# Patient Record
Sex: FEMALE | Race: WHITE | NOT HISPANIC OR LATINO | Employment: UNEMPLOYED | URBAN - NONMETROPOLITAN AREA
[De-identification: names, ages, dates, MRNs, and addresses within clinical notes are randomized per-mention and may not be internally consistent; named-entity substitution may affect disease eponyms.]

---

## 2017-04-21 ENCOUNTER — LAB (OUTPATIENT)
Dept: ONCOLOGY | Facility: HOSPITAL | Age: 67
End: 2017-04-21

## 2017-04-21 ENCOUNTER — CONSULT (OUTPATIENT)
Dept: ONCOLOGY | Facility: CLINIC | Age: 67
End: 2017-04-21

## 2017-04-21 VITALS
SYSTOLIC BLOOD PRESSURE: 98 MMHG | TEMPERATURE: 98.9 F | HEIGHT: 67 IN | RESPIRATION RATE: 16 BRPM | WEIGHT: 154.5 LBS | BODY MASS INDEX: 24.25 KG/M2 | DIASTOLIC BLOOD PRESSURE: 54 MMHG | HEART RATE: 101 BPM

## 2017-04-21 DIAGNOSIS — D64.9 ANEMIA, UNSPECIFIED TYPE: Primary | ICD-10-CM

## 2017-04-21 DIAGNOSIS — D64.9 ANEMIA, UNSPECIFIED TYPE: ICD-10-CM

## 2017-04-21 PROBLEM — I10 HTN (HYPERTENSION), BENIGN: Status: ACTIVE | Noted: 2017-04-21

## 2017-04-21 PROBLEM — D50.8 IRON DEFICIENCY ANEMIA SECONDARY TO INADEQUATE DIETARY IRON INTAKE: Status: ACTIVE | Noted: 2017-04-21

## 2017-04-21 PROBLEM — M54.89 BACK PAIN DUE TO INFLAMMATORY PROCESS: Status: ACTIVE | Noted: 2017-04-21

## 2017-04-21 LAB
ALBUMIN SERPL-MCNC: 3.1 G/DL (ref 3.4–4.8)
ALBUMIN/GLOB SERPL: 1.2 G/DL (ref 1.1–1.8)
ALP SERPL-CCNC: 53 U/L (ref 38–126)
ALT SERPL W P-5'-P-CCNC: 21 U/L (ref 9–52)
ANION GAP SERPL CALCULATED.3IONS-SCNC: 9 MMOL/L (ref 5–15)
ANISOCYTOSIS BLD QL: NORMAL
AST SERPL-CCNC: 22 U/L (ref 14–36)
BASOPHILS # BLD AUTO: 0.05 10*3/MM3 (ref 0–0.2)
BASOPHILS NFR BLD AUTO: 0.8 % (ref 0–2)
BILIRUB SERPL-MCNC: 0.2 MG/DL (ref 0.2–1.3)
BUN BLD-MCNC: 18 MG/DL (ref 7–21)
BUN/CREAT SERPL: 20.7 (ref 7–25)
CALCIUM SPEC-SCNC: 8.2 MG/DL (ref 8.4–10.2)
CHLORIDE SERPL-SCNC: 102 MMOL/L (ref 95–110)
CO2 SERPL-SCNC: 26 MMOL/L (ref 22–31)
CREAT BLD-MCNC: 0.87 MG/DL (ref 0.5–1)
DEPRECATED RDW RBC AUTO: 50.7 FL (ref 36.4–46.3)
EOSINOPHIL # BLD AUTO: 0.32 10*3/MM3 (ref 0–0.7)
EOSINOPHIL NFR BLD AUTO: 5.3 % (ref 0–7)
ERYTHROCYTE [DISTWIDTH] IN BLOOD BY AUTOMATED COUNT: 18 % (ref 11.5–14.5)
FERRITIN SERPL-MCNC: 11.1 NG/ML (ref 11.1–264)
GFR SERPL CREATININE-BSD FRML MDRD: 65 ML/MIN/1.73 (ref 60–104)
GLOBULIN UR ELPH-MCNC: 2.5 GM/DL (ref 2.3–3.5)
GLUCOSE BLD-MCNC: 118 MG/DL (ref 60–100)
HCT VFR BLD AUTO: 24.1 % (ref 35–45)
HGB BLD-MCNC: 7 G/DL (ref 12–15.5)
HYPOCHROMIA BLD QL: NORMAL
IMM GRANULOCYTES # BLD: 0.02 10*3/MM3 (ref 0–0.02)
IMM GRANULOCYTES NFR BLD: 0.3 % (ref 0–0.5)
IRON 24H UR-MRATE: 10 MCG/DL (ref 37–170)
IRON SATN MFR SERPL: 3 % (ref 15–50)
LDH SERPL-CCNC: 425 U/L (ref 313–618)
LYMPHOCYTES # BLD AUTO: 0.95 10*3/MM3 (ref 0.6–4.2)
LYMPHOCYTES NFR BLD AUTO: 15.7 % (ref 10–50)
MCH RBC QN AUTO: 23.1 PG (ref 26.5–34)
MCHC RBC AUTO-ENTMCNC: 29 G/DL (ref 31.4–36)
MCV RBC AUTO: 79.5 FL (ref 80–98)
MICROCYTES BLD QL: NORMAL
MONOCYTES # BLD AUTO: 0.31 10*3/MM3 (ref 0–0.9)
MONOCYTES NFR BLD AUTO: 5.1 % (ref 0–12)
NEUTROPHILS # BLD AUTO: 4.42 10*3/MM3 (ref 2–8.6)
NEUTROPHILS NFR BLD AUTO: 72.8 % (ref 37–80)
NRBC BLD MANUAL-RTO: 0 /100 WBC (ref 0–0)
PLATELET # BLD AUTO: 286 10*3/MM3 (ref 150–450)
PMV BLD AUTO: 9.1 FL (ref 8–12)
POTASSIUM BLD-SCNC: 3.3 MMOL/L (ref 3.5–5.1)
PROT SERPL-MCNC: 5.6 G/DL (ref 6.3–8.6)
RBC # BLD AUTO: 3.03 10*6/MM3 (ref 3.77–5.16)
SMALL PLATELETS BLD QL SMEAR: ADEQUATE
SODIUM BLD-SCNC: 137 MMOL/L (ref 137–145)
TIBC SERPL-MCNC: 379 MCG/DL (ref 265–497)
VIT B12 BLD-MCNC: 281 PG/ML (ref 239–931)
WBC MORPH BLD: NORMAL
WBC NRBC COR # BLD: 6.07 10*3/MM3 (ref 3.2–9.8)

## 2017-04-21 PROCEDURE — 83550 IRON BINDING TEST: CPT | Performed by: INTERNAL MEDICINE

## 2017-04-21 PROCEDURE — 82728 ASSAY OF FERRITIN: CPT | Performed by: INTERNAL MEDICINE

## 2017-04-21 PROCEDURE — 83540 ASSAY OF IRON: CPT | Performed by: INTERNAL MEDICINE

## 2017-04-21 PROCEDURE — 83615 LACTATE (LD) (LDH) ENZYME: CPT | Performed by: INTERNAL MEDICINE

## 2017-04-21 PROCEDURE — 36415 COLL VENOUS BLD VENIPUNCTURE: CPT | Performed by: INTERNAL MEDICINE

## 2017-04-21 PROCEDURE — 85025 COMPLETE CBC W/AUTO DIFF WBC: CPT | Performed by: INTERNAL MEDICINE

## 2017-04-21 PROCEDURE — 80053 COMPREHEN METABOLIC PANEL: CPT | Performed by: INTERNAL MEDICINE

## 2017-04-21 PROCEDURE — 85007 BL SMEAR W/DIFF WBC COUNT: CPT | Performed by: INTERNAL MEDICINE

## 2017-04-21 PROCEDURE — G0463 HOSPITAL OUTPT CLINIC VISIT: HCPCS | Performed by: INTERNAL MEDICINE

## 2017-04-21 PROCEDURE — 82607 VITAMIN B-12: CPT | Performed by: INTERNAL MEDICINE

## 2017-04-21 PROCEDURE — 99214 OFFICE O/P EST MOD 30 MIN: CPT | Performed by: INTERNAL MEDICINE

## 2017-04-21 RX ORDER — DULOXETIN HYDROCHLORIDE 60 MG/1
CAPSULE, DELAYED RELEASE ORAL
COMMUNITY
Start: 2016-12-27 | End: 2018-01-29 | Stop reason: SDUPTHER

## 2017-04-21 RX ORDER — TRAZODONE HYDROCHLORIDE 150 MG/1
TABLET ORAL
COMMUNITY
Start: 2016-12-27 | End: 2018-01-29 | Stop reason: SDUPTHER

## 2017-04-21 RX ORDER — TRAMADOL HYDROCHLORIDE 50 MG/1
50 TABLET ORAL EVERY 8 HOURS
COMMUNITY
Start: 2015-06-04

## 2017-04-21 RX ORDER — ATENOLOL 25 MG/1
TABLET ORAL
COMMUNITY
Start: 2016-12-27 | End: 2018-01-29 | Stop reason: SDUPTHER

## 2017-04-21 RX ORDER — OXYCODONE AND ACETAMINOPHEN 7.5; 325 MG/1; MG/1
1 TABLET ORAL DAILY PRN
COMMUNITY

## 2017-04-21 RX ORDER — CYCLOBENZAPRINE HCL 10 MG
10 TABLET ORAL 2 TIMES DAILY
COMMUNITY

## 2017-04-21 NOTE — PROGRESS NOTES
"  Subjective    Mr. Jett Duong is seen at the request of Dr. Mack Cameron for anemia.  Patient is feeling tired.         History of Present Illness    This is a 67-year-old female who was seen in our office more than 2 years ago.  Patient has a history of iron deficiency anemia with negative GI workup.  She has had upper and lower endoscopy which were negative.  She could not complete the capsule endoscopy study.  There is no history of GI bleed or any other signs of blood loss.  Patient was found to have a hemoglobin of 8.2 and hence sent over here.  No other complaints.    Past Medical History, Past Surgical History, Social History, Family History have been reviewed and are without significant changes except as mentioned.    Review of Systems   Constitutional: Negative.    HENT: Negative.    Eyes: Negative.    Respiratory: Negative.    Cardiovascular: Negative.    Gastrointestinal: Negative.    Endocrine: Negative.    Genitourinary: Negative.    Musculoskeletal: Negative.    Skin: Negative.    Allergic/Immunologic: Negative.    Neurological: Negative.    Hematological: Negative.    Psychiatric/Behavioral: Negative.       A comprehensive 14 point review of systems was performed and was negative except as mentioned.    Medications:  The current medication list was reviewed in the EMR    ALLERGIES:    Allergies   Allergen Reactions   • Diphenhydramine    • Metoclopramide    • Promethazine        Objective      Vitals:    04/21/17 1327   BP: 98/54   Pulse: 101   Resp: 16   Temp: 98.9 °F (37.2 °C)   Weight: 154 lb 8 oz (70.1 kg)   Height: 66.5\" (168.9 cm)   PainSc:   5   PainLoc: Back     No flowsheet data found.    Physical Exam   Constitutional: She is oriented to person, place, and time. She appears well-developed and well-nourished.   HENT:   Head: Normocephalic and atraumatic.   Mouth/Throat: Oropharynx is clear and moist.   Eyes: EOM are normal. Pupils are equal, round, and reactive to light.   Neck: Normal range " of motion. Neck supple.   Cardiovascular: Normal rate, regular rhythm, normal heart sounds and intact distal pulses.    Pulmonary/Chest: Effort normal and breath sounds normal.   Abdominal: Soft. Bowel sounds are normal.   Musculoskeletal: Normal range of motion.   Neurological: She is alert and oriented to person, place, and time. She has normal reflexes.   Skin: Skin is warm.   Psychiatric: She has a normal mood and affect. Her behavior is normal. Judgment and thought content normal.         RECENT LABS:  Hematology WBC   Date Value Ref Range Status   06/13/2016 7.0 3.2 - 9.8 x1000/uL Final     RBC   Date Value Ref Range Status   06/13/2016 3.37 (L) 3.77 - 5.16 niru/mm3 Final     Hemoglobin   Date Value Ref Range Status   06/13/2016 8.6 (L) 12.0 - 15.5 gm/dl Final     Hematocrit   Date Value Ref Range Status   06/13/2016 28.9 (L) 35.0 - 45.0 % Final     Platelets   Date Value Ref Range Status   06/13/2016 262 150 - 450 x1000/mm3 Final              Assessment/Plan   1.  Iron deficiency anemia with negative GI workup  2.  Hypertension  3.  Chronic back pain     Patient with history of iron deficiency anemia.  Patient has been unable to tolerate the oral iron.  I will proceed with the blood work including vitamin B12 LDH and profile and ferritin.  Based upon these results we will plan on IV iron.  She may require repeat GI workup.  Patient will be seen in the office in 2 weeks time with the results.  We may consider Feraheme at that time.  Thanks for allowing me to participate in taking care of this patient.                4/21/2017      CC:

## 2017-04-29 ENCOUNTER — APPOINTMENT (OUTPATIENT)
Dept: GENERAL RADIOLOGY | Facility: HOSPITAL | Age: 67
End: 2017-04-29

## 2017-04-29 ENCOUNTER — HOSPITAL ENCOUNTER (EMERGENCY)
Facility: HOSPITAL | Age: 67
Discharge: HOME | End: 2017-04-29
Attending: FAMILY MEDICINE | Admitting: FAMILY MEDICINE

## 2017-04-29 VITALS
HEIGHT: 67 IN | TEMPERATURE: 98.4 F | DIASTOLIC BLOOD PRESSURE: 69 MMHG | OXYGEN SATURATION: 99 % | BODY MASS INDEX: 23.54 KG/M2 | HEART RATE: 89 BPM | WEIGHT: 150 LBS | RESPIRATION RATE: 18 BRPM | SYSTOLIC BLOOD PRESSURE: 152 MMHG

## 2017-04-29 DIAGNOSIS — S22.000A COMPRESSION FRACTURE OF THORACIC VERTEBRA, CLOSED, INITIAL ENCOUNTER (HCC): ICD-10-CM

## 2017-04-29 DIAGNOSIS — E87.6 HYPOKALEMIA: ICD-10-CM

## 2017-04-29 DIAGNOSIS — D50.9 IRON DEFICIENCY ANEMIA, UNSPECIFIED IRON DEFICIENCY ANEMIA TYPE: Primary | ICD-10-CM

## 2017-04-29 LAB
ACANTHOCYTES BLD QL SMEAR: NORMAL
ALBUMIN SERPL-MCNC: 3.9 G/DL (ref 3.4–4.8)
ALBUMIN/GLOB SERPL: 1.4 G/DL (ref 1.1–1.8)
ALP SERPL-CCNC: 78 U/L (ref 38–126)
ALT SERPL W P-5'-P-CCNC: 32 U/L (ref 9–52)
ANION GAP SERPL CALCULATED.3IONS-SCNC: 11 MMOL/L (ref 5–15)
ANISOCYTOSIS BLD QL: NORMAL
AST SERPL-CCNC: 31 U/L (ref 14–36)
BILIRUB SERPL-MCNC: 0.3 MG/DL (ref 0.2–1.3)
BUN BLD-MCNC: 10 MG/DL (ref 7–21)
BUN/CREAT SERPL: 12.8 (ref 7–25)
CALCIUM SPEC-SCNC: 8.9 MG/DL (ref 8.4–10.2)
CHLORIDE SERPL-SCNC: 102 MMOL/L (ref 95–110)
CO2 SERPL-SCNC: 26 MMOL/L (ref 22–31)
CREAT BLD-MCNC: 0.78 MG/DL (ref 0.5–1)
DEPRECATED RDW RBC AUTO: 54 FL (ref 36.4–46.3)
EOSINOPHIL # BLD MANUAL: 0.22 10*3/MM3 (ref 0–0.7)
EOSINOPHIL NFR BLD MANUAL: 3 % (ref 0–7)
ERYTHROCYTE [DISTWIDTH] IN BLOOD BY AUTOMATED COUNT: 18.8 % (ref 11.5–14.5)
GFR SERPL CREATININE-BSD FRML MDRD: 74 ML/MIN/1.73 (ref 45–104)
GLOBULIN UR ELPH-MCNC: 2.8 GM/DL (ref 2.3–3.5)
GLUCOSE BLD-MCNC: 92 MG/DL (ref 60–100)
HCT VFR BLD AUTO: 25.8 % (ref 35–45)
HGB BLD-MCNC: 7.7 G/DL (ref 12–15.5)
HOLD SPECIMEN: NORMAL
HYPOCHROMIA BLD QL: NORMAL
LIPASE SERPL-CCNC: 49 U/L (ref 23–300)
LYMPHOCYTES # BLD MANUAL: 1.32 10*3/MM3 (ref 0.6–4.2)
LYMPHOCYTES NFR BLD MANUAL: 18 % (ref 10–50)
LYMPHOCYTES NFR BLD MANUAL: 3 % (ref 0–12)
MCH RBC QN AUTO: 23.5 PG (ref 26.5–34)
MCHC RBC AUTO-ENTMCNC: 29.8 G/DL (ref 31.4–36)
MCV RBC AUTO: 78.7 FL (ref 80–98)
MICROCYTES BLD QL: NORMAL
MONOCYTES # BLD AUTO: 0.22 10*3/MM3 (ref 0–0.9)
NEUTROPHILS # BLD AUTO: 5.58 10*3/MM3 (ref 2–8.6)
NEUTROPHILS NFR BLD MANUAL: 76 % (ref 37–80)
NEUTS HYPERSEG # BLD: NORMAL 10*3/UL
OVALOCYTES BLD QL SMEAR: NORMAL
PLATELET # BLD AUTO: 492 10*3/MM3 (ref 150–450)
PMV BLD AUTO: 8.4 FL (ref 8–12)
POLYCHROMASIA BLD QL SMEAR: NORMAL
POTASSIUM BLD-SCNC: 3.2 MMOL/L (ref 3.5–5.1)
PROT SERPL-MCNC: 6.7 G/DL (ref 6.3–8.6)
RBC # BLD AUTO: 3.28 10*6/MM3 (ref 3.77–5.16)
SCHISTOCYTES BLD QL SMEAR: NORMAL
SMALL PLATELETS BLD QL SMEAR: NORMAL
SODIUM BLD-SCNC: 139 MMOL/L (ref 137–145)
WBC NRBC COR # BLD: 7.34 10*3/MM3 (ref 3.2–9.8)
WHOLE BLOOD HOLD SPECIMEN: NORMAL

## 2017-04-29 PROCEDURE — 85007 BL SMEAR W/DIFF WBC COUNT: CPT | Performed by: FAMILY MEDICINE

## 2017-04-29 PROCEDURE — 85025 COMPLETE CBC W/AUTO DIFF WBC: CPT | Performed by: FAMILY MEDICINE

## 2017-04-29 PROCEDURE — 80053 COMPREHEN METABOLIC PANEL: CPT | Performed by: FAMILY MEDICINE

## 2017-04-29 PROCEDURE — 25010000002 MORPHINE PER 10 MG: Performed by: FAMILY MEDICINE

## 2017-04-29 PROCEDURE — 83690 ASSAY OF LIPASE: CPT | Performed by: FAMILY MEDICINE

## 2017-04-29 PROCEDURE — 72100 X-RAY EXAM L-S SPINE 2/3 VWS: CPT

## 2017-04-29 PROCEDURE — 96376 TX/PRO/DX INJ SAME DRUG ADON: CPT

## 2017-04-29 PROCEDURE — 72072 X-RAY EXAM THORAC SPINE 3VWS: CPT

## 2017-04-29 PROCEDURE — 99283 EMERGENCY DEPT VISIT LOW MDM: CPT

## 2017-04-29 PROCEDURE — 96374 THER/PROPH/DIAG INJ IV PUSH: CPT

## 2017-04-29 RX ORDER — DOXYCYCLINE HYCLATE 50 MG/1
324 CAPSULE, GELATIN COATED ORAL
Qty: 20 TABLET | Refills: 0 | Status: SHIPPED | OUTPATIENT
Start: 2017-04-29 | End: 2017-08-10

## 2017-04-29 RX ORDER — MORPHINE SULFATE 4 MG/ML
4 INJECTION, SOLUTION INTRAMUSCULAR; INTRAVENOUS ONCE
Status: COMPLETED | OUTPATIENT
Start: 2017-04-29 | End: 2017-04-29

## 2017-04-29 RX ORDER — POTASSIUM CHLORIDE 750 MG/1
10 TABLET, FILM COATED, EXTENDED RELEASE ORAL 2 TIMES DAILY
Qty: 20 TABLET | Refills: 0 | Status: SHIPPED | OUTPATIENT
Start: 2017-04-29 | End: 2018-01-05

## 2017-04-29 RX ADMIN — MORPHINE SULFATE 4 MG: 4 INJECTION, SOLUTION INTRAMUSCULAR; INTRAVENOUS at 18:06

## 2017-04-29 RX ADMIN — MORPHINE SULFATE 4 MG: 4 INJECTION, SOLUTION INTRAMUSCULAR; INTRAVENOUS at 15:33

## 2017-05-03 ENCOUNTER — INFUSION (OUTPATIENT)
Dept: ONCOLOGY | Facility: HOSPITAL | Age: 67
End: 2017-05-03

## 2017-05-03 VITALS
HEART RATE: 94 BPM | RESPIRATION RATE: 16 BRPM | SYSTOLIC BLOOD PRESSURE: 127 MMHG | DIASTOLIC BLOOD PRESSURE: 67 MMHG | TEMPERATURE: 98.2 F

## 2017-05-03 DIAGNOSIS — I10 HTN (HYPERTENSION), BENIGN: ICD-10-CM

## 2017-05-03 DIAGNOSIS — D50.8 IRON DEFICIENCY ANEMIA SECONDARY TO INADEQUATE DIETARY IRON INTAKE: Primary | ICD-10-CM

## 2017-05-03 DIAGNOSIS — M54.89 BACK PAIN DUE TO INFLAMMATORY PROCESS: ICD-10-CM

## 2017-05-03 PROCEDURE — 25010000002 FERUMOXYTOL 510 MG/17ML SOLUTION 510 MG VIAL: Performed by: INTERNAL MEDICINE

## 2017-05-03 PROCEDURE — 96374 THER/PROPH/DIAG INJ IV PUSH: CPT | Performed by: INTERNAL MEDICINE

## 2017-05-03 RX ORDER — SODIUM CHLORIDE 9 MG/ML
250 INJECTION, SOLUTION INTRAVENOUS ONCE
Status: COMPLETED | OUTPATIENT
Start: 2017-05-03 | End: 2017-05-03

## 2017-05-03 RX ADMIN — SODIUM CHLORIDE 250 ML: 9 INJECTION, SOLUTION INTRAVENOUS at 14:17

## 2017-05-03 RX ADMIN — FERUMOXYTOL 510 MG: 510 INJECTION INTRAVENOUS at 14:49

## 2017-05-04 DIAGNOSIS — D50.8 IRON DEFICIENCY ANEMIA SECONDARY TO INADEQUATE DIETARY IRON INTAKE: Primary | ICD-10-CM

## 2017-05-05 ENCOUNTER — APPOINTMENT (OUTPATIENT)
Dept: ONCOLOGY | Facility: CLINIC | Age: 67
End: 2017-05-05

## 2017-05-10 ENCOUNTER — INFUSION (OUTPATIENT)
Dept: ONCOLOGY | Facility: HOSPITAL | Age: 67
End: 2017-05-10

## 2017-05-10 VITALS
DIASTOLIC BLOOD PRESSURE: 81 MMHG | RESPIRATION RATE: 16 BRPM | TEMPERATURE: 98.2 F | HEART RATE: 80 BPM | SYSTOLIC BLOOD PRESSURE: 114 MMHG

## 2017-05-10 DIAGNOSIS — D50.8 IRON DEFICIENCY ANEMIA SECONDARY TO INADEQUATE DIETARY IRON INTAKE: Primary | ICD-10-CM

## 2017-05-10 PROCEDURE — 25010000002 FERUMOXYTOL 510 MG/17ML SOLUTION 510 MG VIAL: Performed by: INTERNAL MEDICINE

## 2017-05-10 PROCEDURE — 96374 THER/PROPH/DIAG INJ IV PUSH: CPT | Performed by: INTERNAL MEDICINE

## 2017-05-10 RX ORDER — SODIUM CHLORIDE 9 MG/ML
250 INJECTION, SOLUTION INTRAVENOUS ONCE
Status: COMPLETED | OUTPATIENT
Start: 2017-05-10 | End: 2017-05-10

## 2017-05-10 RX ADMIN — SODIUM CHLORIDE 250 ML: 9 INJECTION, SOLUTION INTRAVENOUS at 14:14

## 2017-05-10 RX ADMIN — FERUMOXYTOL 510 MG: 510 INJECTION INTRAVENOUS at 14:14

## 2017-08-08 ENCOUNTER — LAB (OUTPATIENT)
Dept: ONCOLOGY | Facility: HOSPITAL | Age: 67
End: 2017-08-08

## 2017-08-08 DIAGNOSIS — D50.8 IRON DEFICIENCY ANEMIA SECONDARY TO INADEQUATE DIETARY IRON INTAKE: ICD-10-CM

## 2017-08-08 LAB
BASOPHILS # BLD AUTO: 0.03 10*3/MM3 (ref 0–0.2)
BASOPHILS NFR BLD AUTO: 0.6 % (ref 0–2)
DEPRECATED RDW RBC AUTO: 48.2 FL (ref 36.4–46.3)
EOSINOPHIL # BLD AUTO: 0.19 10*3/MM3 (ref 0–0.7)
EOSINOPHIL NFR BLD AUTO: 3.8 % (ref 0–7)
ERYTHROCYTE [DISTWIDTH] IN BLOOD BY AUTOMATED COUNT: 15.1 % (ref 11.5–14.5)
FERRITIN SERPL-MCNC: 45.6 NG/ML (ref 11.1–264)
HCT VFR BLD AUTO: 34.4 % (ref 35–45)
HGB BLD-MCNC: 11.4 G/DL (ref 12–15.5)
IMM GRANULOCYTES # BLD: 0.01 10*3/MM3 (ref 0–0.02)
IMM GRANULOCYTES NFR BLD: 0.2 % (ref 0–0.5)
IRON 24H UR-MRATE: 42 MCG/DL (ref 37–170)
IRON SATN MFR SERPL: 15 % (ref 15–50)
LYMPHOCYTES # BLD AUTO: 1.11 10*3/MM3 (ref 0.6–4.2)
LYMPHOCYTES NFR BLD AUTO: 22 % (ref 10–50)
MCH RBC QN AUTO: 29.4 PG (ref 26.5–34)
MCHC RBC AUTO-ENTMCNC: 33.1 G/DL (ref 31.4–36)
MCV RBC AUTO: 88.7 FL (ref 80–98)
MONOCYTES # BLD AUTO: 0.31 10*3/MM3 (ref 0–0.9)
MONOCYTES NFR BLD AUTO: 6.1 % (ref 0–12)
NEUTROPHILS # BLD AUTO: 3.4 10*3/MM3 (ref 2–8.6)
NEUTROPHILS NFR BLD AUTO: 67.3 % (ref 37–80)
PLATELET # BLD AUTO: 220 10*3/MM3 (ref 150–450)
PMV BLD AUTO: 9.8 FL (ref 8–12)
RBC # BLD AUTO: 3.88 10*6/MM3 (ref 3.77–5.16)
TIBC SERPL-MCNC: 288 MCG/DL (ref 265–497)
WBC NRBC COR # BLD: 5.05 10*3/MM3 (ref 3.2–9.8)

## 2017-08-08 PROCEDURE — 83550 IRON BINDING TEST: CPT | Performed by: INTERNAL MEDICINE

## 2017-08-08 PROCEDURE — 85025 COMPLETE CBC W/AUTO DIFF WBC: CPT | Performed by: INTERNAL MEDICINE

## 2017-08-08 PROCEDURE — 83540 ASSAY OF IRON: CPT | Performed by: INTERNAL MEDICINE

## 2017-08-08 PROCEDURE — 82728 ASSAY OF FERRITIN: CPT | Performed by: INTERNAL MEDICINE

## 2017-08-08 PROCEDURE — 36415 COLL VENOUS BLD VENIPUNCTURE: CPT | Performed by: INTERNAL MEDICINE

## 2017-08-10 ENCOUNTER — OFFICE VISIT (OUTPATIENT)
Dept: ONCOLOGY | Facility: CLINIC | Age: 67
End: 2017-08-10

## 2017-08-10 VITALS
SYSTOLIC BLOOD PRESSURE: 146 MMHG | RESPIRATION RATE: 18 BRPM | HEIGHT: 63 IN | DIASTOLIC BLOOD PRESSURE: 75 MMHG | HEART RATE: 74 BPM | BODY MASS INDEX: 26.81 KG/M2 | WEIGHT: 151.3 LBS | TEMPERATURE: 98.4 F

## 2017-08-10 DIAGNOSIS — D50.8 IRON DEFICIENCY ANEMIA SECONDARY TO INADEQUATE DIETARY IRON INTAKE: Primary | ICD-10-CM

## 2017-08-10 PROCEDURE — G0463 HOSPITAL OUTPT CLINIC VISIT: HCPCS | Performed by: INTERNAL MEDICINE

## 2017-08-10 PROCEDURE — 99214 OFFICE O/P EST MOD 30 MIN: CPT | Performed by: INTERNAL MEDICINE

## 2017-08-10 RX ORDER — SODIUM CHLORIDE 9 MG/ML
250 INJECTION, SOLUTION INTRAVENOUS ONCE
Status: CANCELLED | OUTPATIENT
Start: 2017-08-17

## 2017-08-10 NOTE — PROGRESS NOTES
DATE OF VISIT: 8/10/2017    REASON FOR VISIT:  Iron deficiency anemia    HISTORY OF PRESENT ILLNESS:    67-year-old female with a past medical history significant for hypertension, history of chronic back pain secondary to vertebral fracture for many years, was initially seen in consultation here at [facility] by Dr. Martinez in April 2017 for evaluation of iron deficiency anemia.  In view of patient not being able to tolerate iron by mouth she was started on intravenous Feraheme that she received 2 dose in May 2017.  She is here for follow-up visit today.  Still complains of fatigue.  Denies any blood in the stool or urine.  Denies any abnormal swollen and anywhere in the body.      PAST MEDICAL HISTORY:    Past Medical History:   Diagnosis Date   • Anemia    • Hypertension        SOCIAL HISTORY:    Social History   Substance Use Topics   • Smoking status: Never Smoker   • Smokeless tobacco: None   • Alcohol use No       Surgical History :  Past Surgical History:   Procedure Laterality Date   • CHOLECYSTECTOMY     • HYSTERECTOMY         ALLERGIES:    Allergies   Allergen Reactions   • Diphenhydramine    • Metoclopramide    • Promethazine        REVIEW OF SYSTEMS:      CONSTITUTIONAL: Positive for fatigue.  No fever, chills, or night sweats.     HEENT:  No epistaxis, mouth sores, or difficulty swallowing.    RESPIRATORY:  No new shortness of breath or cough at present.    CARDIOVASCULAR:  No chest pain or palpitations.    GASTROINTESTINAL:  No abdominal pain, nausea, vomiting, or blood in the stool.    GENITOURINARY:  No dysuria or hematuria.    MUSCULOSKELETAL:  Complains of chronic back pain secondary to vertebral fracture.     NEUROLOGICAL:  No tingling or numbness. No new headache or dizziness.     LYMPHATICS:  Denies any abnormal swollen and anywhere in the body.    SKIN:  Denies any new skin rash.          PHYSICAL EXAMINATION:      VITAL SIGNS:  /75  Pulse 74  Temp 98.4 °F (36.9 °C)  "(Temporal Artery )   Resp 18  Ht 63\" (160 cm)  Wt 151 lb 4.8 oz (68.6 kg)  BMI 26.8 kg/m2    GENERAL:  Not in any distress.    HEENT:  Normocephalic, Atraumatic.Mild Conjunctival pallor. No icterus. Extraocular Movements Intact. No Facial Asymmetry noted.    NECK:  No adenopathy. No JVD.    RESPIRATORY:  Fair air entry bilateral. No rhonchi or wheezing.    CARDIOVASCULAR:  S1, S2. Regular rate and rhythm. No murmur or gallop appreciated.    ABDOMEN:  Soft, obese, nontender. Bowel sounds present in all four quadrants.  No organomegaly appreciated.    EXTREMITIES:  No edema.No Calf Tenderness.    NEUROLOGIC:  Alert, awake and oriented ×3.  No  Motor or sensory deficit appreciated. Cranial Nerves 2-12 grossly intact.        DIAGNOSTIC DATA:    Glucose   Date Value Ref Range Status   04/29/2017 92 60 - 100 mg/dL Final     Sodium   Date Value Ref Range Status   04/29/2017 139 137 - 145 mmol/L Final     Potassium   Date Value Ref Range Status   04/29/2017 3.2 (L) 3.5 - 5.1 mmol/L Final     CO2   Date Value Ref Range Status   04/29/2017 26.0 22.0 - 31.0 mmol/L Final     Chloride   Date Value Ref Range Status   04/29/2017 102 95 - 110 mmol/L Final     Anion Gap   Date Value Ref Range Status   04/29/2017 11.0 5.0 - 15.0 mmol/L Final     Creatinine   Date Value Ref Range Status   04/29/2017 0.78 0.50 - 1.00 mg/dL Final     BUN   Date Value Ref Range Status   04/29/2017 10 7 - 21 mg/dL Final     BUN/Creatinine Ratio   Date Value Ref Range Status   04/29/2017 12.8 7.0 - 25.0 Final     Calcium   Date Value Ref Range Status   04/29/2017 8.9 8.4 - 10.2 mg/dL Final     eGFR Non  Amer   Date Value Ref Range Status   04/29/2017 74 45 - 104 mL/min/1.73 Final     Alkaline Phosphatase   Date Value Ref Range Status   04/29/2017 78 38 - 126 U/L Final     Total Protein   Date Value Ref Range Status   04/29/2017 6.7 6.3 - 8.6 g/dL Final     ALT (SGPT)   Date Value Ref Range Status   04/29/2017 32 9 - 52 U/L Final     AST (SGOT) "   Date Value Ref Range Status   04/29/2017 31 14 - 36 U/L Final     Total Bilirubin   Date Value Ref Range Status   04/29/2017 0.3 0.2 - 1.3 mg/dL Final     Albumin   Date Value Ref Range Status   04/29/2017 3.90 3.40 - 4.80 g/dL Final     Globulin   Date Value Ref Range Status   04/29/2017 2.8 2.3 - 3.5 gm/dL Final     A/G Ratio   Date Value Ref Range Status   04/29/2017 1.4 1.1 - 1.8 g/dL Final     Lab Results   Component Value Date    WBC 5.05 08/08/2017    HGB 11.4 (L) 08/08/2017    HCT 34.4 (L) 08/08/2017    MCV 88.7 08/08/2017     08/08/2017     Lab Results   Component Value Date    NEUTROABS 3.40 08/08/2017    IRON 42 08/08/2017    TIBC 288 08/08/2017    LABIRON 15 08/08/2017    FERRITIN 45.60 08/08/2017    [id number] 281 04/21/2017       Component      Latest Ref Rng & Units 4/1/2015 4/21/2017 8/8/2017   Ferritin      11.10 - 264.00 ng/mL 130.0 11.10 45.60       Component      Latest Ref Rng & Units 4/21/2017 8/8/2017   Iron      37 - 170 mcg/dL 10 (L) 42   TIBC      265 - 497 mcg/dL 379 288   Iron Saturation      15 - 50 % 3 (L) 15             ASSESSMENT AND PLAN:      1.  Iron deficiency anemia: Questionable etiology at this point.  Patient states her last gastroenterology workup was done about 10 years ago by Dr. Santiago.  Denies any blood in the stool or urine.  She is status post hysterectomy many years ago for endometriosis and does not have any recent menstrual loss.  Patient was initially seen by Dr. Martinez in April 2017 and at that point her hemoglobin was 7.7 with iron saturation of 3%.  Since patient is not able to tolerate iron by mouth she was started on intravenous Feraheme.  She received 2 dose of Feraheme on May 3, 2017 and May 10, 2017.  Her hemoglobin has come up to 11 but her iron level is still borderline at 15%.  We will start patient again on intravenous Feraheme starting next week to prevent further drop in hemoglobin.  Since patient is not able to take iron pill by mouth she  was instructed to start consuming food rich in iron like green leafy vegetables.  We will refer her to Dr. Santiago again to get evaluated for recurrent iron deficiency.  We'll see her back in about 3 months with a repeat CBC and anemia workup to be done prior to that.    2.  Hypertension    3.  Health maintenance: Patient does not smoke.  Had a colonoscopy in about 10 years ago.  Remains full code.  Patient has not had a mammogram done for many years, she was counseled about the importance of screening mammogram.  She states she will go back to her PMD and get her mammogram scheduled.       Hiren Londono MD  8/10/2017  12:51 PM        EMR Dragon/Transcription disclaimer:   Much of this encounter note is an electronic transcription/translation of spoken language to printed text. The electronic translation of spoken language may permit erroneous, or at times, nonsensical words or phrases to be inadvertently transcribed; Although I have reviewed the note for such errors, some may still exist.

## 2017-08-17 ENCOUNTER — INFUSION (OUTPATIENT)
Dept: ONCOLOGY | Facility: HOSPITAL | Age: 67
End: 2017-08-17

## 2017-08-17 VITALS — SYSTOLIC BLOOD PRESSURE: 97 MMHG | TEMPERATURE: 98 F | HEART RATE: 85 BPM | DIASTOLIC BLOOD PRESSURE: 74 MMHG

## 2017-08-17 DIAGNOSIS — D50.8 IRON DEFICIENCY ANEMIA SECONDARY TO INADEQUATE DIETARY IRON INTAKE: Primary | ICD-10-CM

## 2017-08-17 PROCEDURE — 25010000002 FERUMOXYTOL 510 MG/17ML SOLUTION 510 MG VIAL: Performed by: INTERNAL MEDICINE

## 2017-08-17 PROCEDURE — 96375 TX/PRO/DX INJ NEW DRUG ADDON: CPT | Performed by: INTERNAL MEDICINE

## 2017-08-17 PROCEDURE — 96374 THER/PROPH/DIAG INJ IV PUSH: CPT | Performed by: INTERNAL MEDICINE

## 2017-08-17 RX ORDER — SODIUM CHLORIDE 9 MG/ML
250 INJECTION, SOLUTION INTRAVENOUS ONCE
Status: COMPLETED | OUTPATIENT
Start: 2017-08-17 | End: 2017-08-17

## 2017-08-17 RX ORDER — FAMOTIDINE 10 MG/ML
20 INJECTION, SOLUTION INTRAVENOUS ONCE
Status: COMPLETED | OUTPATIENT
Start: 2017-08-17 | End: 2017-08-17

## 2017-08-17 RX ORDER — SODIUM CHLORIDE 9 MG/ML
250 INJECTION, SOLUTION INTRAVENOUS ONCE
Status: CANCELLED | OUTPATIENT
Start: 2017-08-17

## 2017-08-17 RX ADMIN — FAMOTIDINE 20 MG: 10 INJECTION, SOLUTION INTRAVENOUS at 13:46

## 2017-08-17 RX ADMIN — SODIUM CHLORIDE 250 ML: 9 INJECTION, SOLUTION INTRAVENOUS at 14:06

## 2017-08-17 RX ADMIN — FERUMOXYTOL 510 MG: 510 INJECTION INTRAVENOUS at 14:06

## 2017-08-24 ENCOUNTER — INFUSION (OUTPATIENT)
Dept: ONCOLOGY | Facility: HOSPITAL | Age: 67
End: 2017-08-24

## 2017-08-24 VITALS
RESPIRATION RATE: 16 BRPM | TEMPERATURE: 97.7 F | DIASTOLIC BLOOD PRESSURE: 68 MMHG | SYSTOLIC BLOOD PRESSURE: 99 MMHG | HEART RATE: 87 BPM

## 2017-08-24 DIAGNOSIS — D50.8 IRON DEFICIENCY ANEMIA SECONDARY TO INADEQUATE DIETARY IRON INTAKE: Primary | ICD-10-CM

## 2017-08-24 PROCEDURE — 96374 THER/PROPH/DIAG INJ IV PUSH: CPT | Performed by: INTERNAL MEDICINE

## 2017-08-24 PROCEDURE — 25010000002 FERUMOXYTOL 510 MG/17ML SOLUTION 510 MG VIAL: Performed by: INTERNAL MEDICINE

## 2017-08-24 PROCEDURE — 96375 TX/PRO/DX INJ NEW DRUG ADDON: CPT | Performed by: INTERNAL MEDICINE

## 2017-08-24 RX ORDER — SODIUM CHLORIDE 9 MG/ML
250 INJECTION, SOLUTION INTRAVENOUS ONCE
Status: CANCELLED | OUTPATIENT
Start: 2017-08-24

## 2017-08-24 RX ORDER — SODIUM CHLORIDE 9 MG/ML
250 INJECTION, SOLUTION INTRAVENOUS ONCE
Status: COMPLETED | OUTPATIENT
Start: 2017-08-24 | End: 2017-08-24

## 2017-08-24 RX ORDER — FAMOTIDINE 10 MG/ML
20 INJECTION, SOLUTION INTRAVENOUS ONCE
Status: COMPLETED | OUTPATIENT
Start: 2017-08-24 | End: 2017-08-24

## 2017-08-24 RX ADMIN — SODIUM CHLORIDE 250 ML: 9 INJECTION, SOLUTION INTRAVENOUS at 13:15

## 2017-08-24 RX ADMIN — FERUMOXYTOL 510 MG: 510 INJECTION INTRAVENOUS at 13:46

## 2017-08-24 RX ADMIN — FAMOTIDINE 20 MG: 10 INJECTION, SOLUTION INTRAVENOUS at 13:25

## 2017-11-14 ENCOUNTER — LAB (OUTPATIENT)
Dept: ONCOLOGY | Facility: HOSPITAL | Age: 67
End: 2017-11-14

## 2017-11-14 DIAGNOSIS — D50.8 IRON DEFICIENCY ANEMIA SECONDARY TO INADEQUATE DIETARY IRON INTAKE: ICD-10-CM

## 2017-11-14 LAB
ALBUMIN SERPL-MCNC: 3.8 G/DL (ref 3.4–4.8)
ALBUMIN/GLOB SERPL: 1.4 G/DL (ref 1.1–1.8)
ALP SERPL-CCNC: 56 U/L (ref 38–126)
ALT SERPL W P-5'-P-CCNC: 39 U/L (ref 9–52)
ANION GAP SERPL CALCULATED.3IONS-SCNC: 12 MMOL/L (ref 5–15)
AST SERPL-CCNC: 34 U/L (ref 14–36)
BASOPHILS # BLD AUTO: 0.04 10*3/MM3 (ref 0–0.2)
BASOPHILS NFR BLD AUTO: 0.6 % (ref 0–2)
BILIRUB SERPL-MCNC: 0.2 MG/DL (ref 0.2–1.3)
BUN BLD-MCNC: 11 MG/DL (ref 7–21)
BUN/CREAT SERPL: 11.3 (ref 7–25)
CALCIUM SPEC-SCNC: 9 MG/DL (ref 8.4–10.2)
CHLORIDE SERPL-SCNC: 101 MMOL/L (ref 95–110)
CO2 SERPL-SCNC: 30 MMOL/L (ref 22–31)
CREAT BLD-MCNC: 0.97 MG/DL (ref 0.5–1)
DEPRECATED RDW RBC AUTO: 42.5 FL (ref 36.4–46.3)
EOSINOPHIL # BLD AUTO: 0.43 10*3/MM3 (ref 0–0.7)
EOSINOPHIL NFR BLD AUTO: 5.9 % (ref 0–7)
ERYTHROCYTE [DISTWIDTH] IN BLOOD BY AUTOMATED COUNT: 12.3 % (ref 11.5–14.5)
FERRITIN SERPL-MCNC: 170 NG/ML (ref 11.1–264)
FOLATE SERPL-MCNC: 4.44 NG/ML (ref 2.76–21)
GFR SERPL CREATININE-BSD FRML MDRD: 58 ML/MIN/1.73 (ref 45–104)
GLOBULIN UR ELPH-MCNC: 2.8 GM/DL (ref 2.3–3.5)
GLUCOSE BLD-MCNC: 125 MG/DL (ref 60–100)
HCT VFR BLD AUTO: 40.5 % (ref 35–45)
HGB BLD-MCNC: 13.3 G/DL (ref 12–15.5)
IMM GRANULOCYTES # BLD: 0.02 10*3/MM3 (ref 0–0.02)
IMM GRANULOCYTES NFR BLD: 0.3 % (ref 0–0.5)
IRON 24H UR-MRATE: 75 MCG/DL (ref 37–170)
IRON SATN MFR SERPL: 28 % (ref 15–50)
LYMPHOCYTES # BLD AUTO: 1.95 10*3/MM3 (ref 0.6–4.2)
LYMPHOCYTES NFR BLD AUTO: 26.9 % (ref 10–50)
MCH RBC QN AUTO: 31.4 PG (ref 26.5–34)
MCHC RBC AUTO-ENTMCNC: 32.8 G/DL (ref 31.4–36)
MCV RBC AUTO: 95.5 FL (ref 80–98)
MONOCYTES # BLD AUTO: 0.49 10*3/MM3 (ref 0–0.9)
MONOCYTES NFR BLD AUTO: 6.8 % (ref 0–12)
NEUTROPHILS # BLD AUTO: 4.32 10*3/MM3 (ref 2–8.6)
NEUTROPHILS NFR BLD AUTO: 59.5 % (ref 37–80)
NRBC BLD MANUAL-RTO: 0 /100 WBC (ref 0–0)
PLATELET # BLD AUTO: 232 10*3/MM3 (ref 150–450)
PMV BLD AUTO: 9.8 FL (ref 8–12)
POTASSIUM BLD-SCNC: 3.7 MMOL/L (ref 3.5–5.1)
PROT SERPL-MCNC: 6.6 G/DL (ref 6.3–8.6)
RBC # BLD AUTO: 4.24 10*6/MM3 (ref 3.77–5.16)
SODIUM BLD-SCNC: 143 MMOL/L (ref 137–145)
TIBC SERPL-MCNC: 271 MCG/DL (ref 265–497)
VIT B12 BLD-MCNC: 410 PG/ML (ref 239–931)
WBC NRBC COR # BLD: 7.25 10*3/MM3 (ref 3.2–9.8)

## 2017-11-14 PROCEDURE — 36415 COLL VENOUS BLD VENIPUNCTURE: CPT | Performed by: INTERNAL MEDICINE

## 2017-11-14 PROCEDURE — 83550 IRON BINDING TEST: CPT

## 2017-11-14 PROCEDURE — 80053 COMPREHEN METABOLIC PANEL: CPT

## 2017-11-14 PROCEDURE — 82746 ASSAY OF FOLIC ACID SERUM: CPT

## 2017-11-14 PROCEDURE — 82728 ASSAY OF FERRITIN: CPT

## 2017-11-14 PROCEDURE — 85025 COMPLETE CBC W/AUTO DIFF WBC: CPT

## 2017-11-14 PROCEDURE — 83540 ASSAY OF IRON: CPT

## 2017-11-14 PROCEDURE — 82607 VITAMIN B-12: CPT

## 2017-11-16 ENCOUNTER — OFFICE VISIT (OUTPATIENT)
Dept: ONCOLOGY | Facility: CLINIC | Age: 67
End: 2017-11-16

## 2017-11-16 VITALS
WEIGHT: 160.72 LBS | HEIGHT: 63 IN | RESPIRATION RATE: 18 BRPM | SYSTOLIC BLOOD PRESSURE: 110 MMHG | DIASTOLIC BLOOD PRESSURE: 69 MMHG | HEART RATE: 83 BPM | BODY MASS INDEX: 28.48 KG/M2 | TEMPERATURE: 98.7 F

## 2017-11-16 DIAGNOSIS — D50.0 IRON DEFICIENCY ANEMIA DUE TO CHRONIC BLOOD LOSS: Primary | ICD-10-CM

## 2017-11-16 PROCEDURE — G0463 HOSPITAL OUTPT CLINIC VISIT: HCPCS | Performed by: NURSE PRACTITIONER

## 2017-11-16 PROCEDURE — 99214 OFFICE O/P EST MOD 30 MIN: CPT | Performed by: NURSE PRACTITIONER

## 2017-11-16 RX ORDER — FOLIC ACID 1 MG/1
1 TABLET ORAL DAILY
Qty: 90 TABLET | Refills: 1 | Status: SHIPPED | OUTPATIENT
Start: 2017-11-16 | End: 2019-01-25 | Stop reason: SDUPTHER

## 2017-11-16 RX ORDER — LANOLIN ALCOHOL/MO/W.PET/CERES
1000 CREAM (GRAM) TOPICAL DAILY
Qty: 90 TABLET | Refills: 1 | Status: SHIPPED | OUTPATIENT
Start: 2017-11-16 | End: 2018-08-19 | Stop reason: SDUPTHER

## 2017-11-16 NOTE — PROGRESS NOTES
DATE OF VISIT: 11/16/2017    REASON FOR VISIT:  Iron deficiency anemia      HISTORY OF PRESENT ILLNESS:      67-year-old female with a past medical history significant for hypertension, history of chronic back pain secondary to vertebral fracture for many years, was initially seen in consultation here at [facility] by Dr. Martinez in April 2017 for evaluation of iron deficiency anemia.  In view of patient not being able to tolerate iron by mouth she was started on intravenous Feraheme that she received 2 dose in May 2017.  She came in for follow-up and iron levels still below normal so she was given 2 additional doses of IV iron in August. She is here today for f/u. She states she still feels fatigued and tired somewhat, She states she has an appt to see Dr. Resendiz next month. She denies any blood in stool or urine.         PAST MEDICAL HISTORY:    Past Medical History:   Diagnosis Date   • Anemia    • Hypertension        SOCIAL HISTORY:    Social History   Substance Use Topics   • Smoking status: Never Smoker   • Smokeless tobacco: None   • Alcohol use No       Surgical History :  Past Surgical History:   Procedure Laterality Date   • CHOLECYSTECTOMY     • HYSTERECTOMY         ALLERGIES:    Allergies   Allergen Reactions   • Diphenhydramine    • Metoclopramide    • Promethazine        REVIEW OF SYSTEMS:      CONSTITUTIONAL:  No fever, chills, or night sweats.     HEENT:  No epistaxis, mouth sores, or difficulty swallowing.    RESPIRATORY:  No new shortness of breath or cough at present.    CARDIOVASCULAR:  No chest pain or palpitations.    GASTROINTESTINAL:  No abdominal pain, nausea, vomiting, or blood in the stool.    GENITOURINARY:  No dysuria or hematuria.    MUSCULOSKELETAL:  Chronic back pain, still seeing pain specialist in [place].     NEUROLOGICAL:  No tingling or numbness. No new headache or dizziness.     LYMPHATICS:  Denies any abnormal swollen and anywhere in the body.    SKIN:  Denies any  "new skin rash.    PHYSICAL EXAMINATION:      VITAL SIGNS:  /69  Pulse 83  Temp 98.7 °F (37.1 °C) (Temporal Artery )   Resp 18  Ht 62.99\" (160 cm)  Wt 160 lb 11.5 oz (72.9 kg)  BMI 28.48 kg/m2    GENERAL:  Not in any distress.    HEENT:  Normocephalic, Atraumatic.Mild Conjunctival pallor. No icterus. No Facial Asymmetry noted.    NECK:  No adenopathy. No JVD.    RESPIRATORY:  Fair air entry bilateral. No rhonchi or wheezing.    CARDIOVASCULAR:  S1, S2. Regular rate and rhythm. No murmur or gallop appreciated.    ABDOMEN:  Soft, obese, nontender. Bowel sounds present in all four quadrants.  No organomegaly appreciated.    EXTREMITIES:  No edema.No Calf Tenderness.    NEUROLOGIC:  Alert, awake and oriented ×3.      SKIN : No new skin lesion identified  DIAGNOSTIC DATA:    Glucose   Date Value Ref Range Status   11/14/2017 125 (H) 60 - 100 mg/dL Final     Sodium   Date Value Ref Range Status   11/14/2017 143 137 - 145 mmol/L Final     Potassium   Date Value Ref Range Status   11/14/2017 3.7 3.5 - 5.1 mmol/L Final     CO2   Date Value Ref Range Status   11/14/2017 30.0 22.0 - 31.0 mmol/L Final     Chloride   Date Value Ref Range Status   11/14/2017 101 95 - 110 mmol/L Final     Anion Gap   Date Value Ref Range Status   11/14/2017 12.0 5.0 - 15.0 mmol/L Final     Creatinine   Date Value Ref Range Status   11/14/2017 0.97 0.50 - 1.00 mg/dL Final     BUN   Date Value Ref Range Status   11/14/2017 11 7 - 21 mg/dL Final     BUN/Creatinine Ratio   Date Value Ref Range Status   11/14/2017 11.3 7.0 - 25.0 Final     Calcium   Date Value Ref Range Status   11/14/2017 9.0 8.4 - 10.2 mg/dL Final     eGFR Non  Amer   Date Value Ref Range Status   11/14/2017 58 45 - 104 mL/min/1.73 Final     Alkaline Phosphatase   Date Value Ref Range Status   11/14/2017 56 38 - 126 U/L Final     Total Protein   Date Value Ref Range Status   11/14/2017 6.6 6.3 - 8.6 g/dL Final     ALT (SGPT)   Date Value Ref Range Status "   11/14/2017 39 9 - 52 U/L Final     AST (SGOT)   Date Value Ref Range Status   11/14/2017 34 14 - 36 U/L Final     Total Bilirubin   Date Value Ref Range Status   11/14/2017 0.2 0.2 - 1.3 mg/dL Final     Albumin   Date Value Ref Range Status   11/14/2017 3.80 3.40 - 4.80 g/dL Final     Globulin   Date Value Ref Range Status   11/14/2017 2.8 2.3 - 3.5 gm/dL Final     A/G Ratio   Date Value Ref Range Status   11/14/2017 1.4 1.1 - 1.8 g/dL Final     Lab Results   Component Value Date    WBC 7.25 11/14/2017    HGB 13.3 11/14/2017    HCT 40.5 11/14/2017    MCV 95.5 11/14/2017     11/14/2017     Lab Results   Component Value Date    NEUTROABS 4.32 11/14/2017    IRON 75 11/14/2017    TIBC 271 11/14/2017    LABIRON 28 11/14/2017    FERRITIN 170.00 11/14/2017    [id number] 410 11/14/2017    FOLATE 4.44 11/14/2017     No results found for: , LABCA2, AFPTM, HCGQUANT, , CHROMGRNA, [id number], CEA, REFLABREPO]        ASSESSMENT AND PLAN:      1. Iron deficiency anemia, questionable etiology, She is scheduled to see Dr. Santiago next month as it has been 10 yrs since her last colon exam. She has responded to IV iron at this point, her iron levels are back to normal, Her B-12 and folic acid are low so will prescribe her PO medicaitions for this, will have her return to see us with recheck of labs in 3 mos.    2. Hypertension, BP today is 110/69    3. Health maintenance, she does not smoke and is seeing Dr. Santiago next month.       This document has been signed by EVAN Tobias on November 16, 2017 12:54 PM

## 2018-01-10 ENCOUNTER — ANESTHESIA (OUTPATIENT)
Dept: GASTROENTEROLOGY | Facility: HOSPITAL | Age: 68
End: 2018-01-10

## 2018-01-10 ENCOUNTER — ANESTHESIA EVENT (OUTPATIENT)
Dept: GASTROENTEROLOGY | Facility: HOSPITAL | Age: 68
End: 2018-01-10

## 2018-01-10 ENCOUNTER — HOSPITAL ENCOUNTER (OUTPATIENT)
Facility: HOSPITAL | Age: 68
Setting detail: HOSPITAL OUTPATIENT SURGERY
Discharge: HOME | End: 2018-01-10
Attending: INTERNAL MEDICINE | Admitting: INTERNAL MEDICINE

## 2018-01-10 VITALS
BODY MASS INDEX: 26.16 KG/M2 | OXYGEN SATURATION: 94 % | TEMPERATURE: 97.9 F | DIASTOLIC BLOOD PRESSURE: 66 MMHG | HEART RATE: 84 BPM | WEIGHT: 157 LBS | RESPIRATION RATE: 16 BRPM | SYSTOLIC BLOOD PRESSURE: 114 MMHG | HEIGHT: 65 IN

## 2018-01-10 DIAGNOSIS — D50.9 HYPOCHROMIC ANEMIA: ICD-10-CM

## 2018-01-10 DIAGNOSIS — K20.90 ESOPHAGITIS: ICD-10-CM

## 2018-01-10 PROCEDURE — 88305 TISSUE EXAM BY PATHOLOGIST: CPT | Performed by: PATHOLOGY

## 2018-01-10 PROCEDURE — 25010000002 PROPOFOL 10 MG/ML EMULSION: Performed by: NURSE ANESTHETIST, CERTIFIED REGISTERED

## 2018-01-10 PROCEDURE — 94799 UNLISTED PULMONARY SVC/PX: CPT

## 2018-01-10 PROCEDURE — 94760 N-INVAS EAR/PLS OXIMETRY 1: CPT

## 2018-01-10 PROCEDURE — 25010000002 MIDAZOLAM PER 1 MG: Performed by: NURSE ANESTHETIST, CERTIFIED REGISTERED

## 2018-01-10 PROCEDURE — 88305 TISSUE EXAM BY PATHOLOGIST: CPT | Performed by: INTERNAL MEDICINE

## 2018-01-10 PROCEDURE — C1726 CATH, BAL DIL, NON-VASCULAR: HCPCS | Performed by: INTERNAL MEDICINE

## 2018-01-10 PROCEDURE — 94640 AIRWAY INHALATION TREATMENT: CPT

## 2018-01-10 RX ORDER — LIDOCAINE HYDROCHLORIDE 10 MG/ML
INJECTION, SOLUTION INFILTRATION; PERINEURAL AS NEEDED
Status: DISCONTINUED | OUTPATIENT
Start: 2018-01-10 | End: 2018-01-10 | Stop reason: SURG

## 2018-01-10 RX ORDER — MIDAZOLAM HYDROCHLORIDE 1 MG/ML
INJECTION INTRAMUSCULAR; INTRAVENOUS AS NEEDED
Status: DISCONTINUED | OUTPATIENT
Start: 2018-01-10 | End: 2018-01-10 | Stop reason: SURG

## 2018-01-10 RX ORDER — DEXAMETHASONE SODIUM PHOSPHATE 4 MG/ML
8 INJECTION, SOLUTION INTRA-ARTICULAR; INTRALESIONAL; INTRAMUSCULAR; INTRAVENOUS; SOFT TISSUE ONCE AS NEEDED
Status: DISCONTINUED | OUTPATIENT
Start: 2018-01-10 | End: 2018-01-10 | Stop reason: HOSPADM

## 2018-01-10 RX ORDER — PROPOFOL 10 MG/ML
VIAL (ML) INTRAVENOUS AS NEEDED
Status: DISCONTINUED | OUTPATIENT
Start: 2018-01-10 | End: 2018-01-10 | Stop reason: SURG

## 2018-01-10 RX ORDER — ONDANSETRON 2 MG/ML
4 INJECTION INTRAMUSCULAR; INTRAVENOUS ONCE AS NEEDED
Status: DISCONTINUED | OUTPATIENT
Start: 2018-01-10 | End: 2018-01-10 | Stop reason: HOSPADM

## 2018-01-10 RX ORDER — ALBUTEROL SULFATE 2.5 MG/3ML
2.5 SOLUTION RESPIRATORY (INHALATION) ONCE
Status: COMPLETED | OUTPATIENT
Start: 2018-01-10 | End: 2018-01-10

## 2018-01-10 RX ORDER — DEXTROSE AND SODIUM CHLORIDE 5; .45 G/100ML; G/100ML
20 INJECTION, SOLUTION INTRAVENOUS CONTINUOUS
Status: DISCONTINUED | OUTPATIENT
Start: 2018-01-10 | End: 2018-01-10 | Stop reason: HOSPADM

## 2018-01-10 RX ADMIN — LIDOCAINE HYDROCHLORIDE 100 MG: 10 INJECTION, SOLUTION INFILTRATION; PERINEURAL at 16:03

## 2018-01-10 RX ADMIN — ALBUTEROL SULFATE 2.5 MG: 2.5 SOLUTION RESPIRATORY (INHALATION) at 17:38

## 2018-01-10 RX ADMIN — PROPOFOL 40 MG: 10 INJECTION, EMULSION INTRAVENOUS at 16:14

## 2018-01-10 RX ADMIN — PROPOFOL 30 MG: 10 INJECTION, EMULSION INTRAVENOUS at 16:11

## 2018-01-10 RX ADMIN — PROPOFOL 40 MG: 10 INJECTION, EMULSION INTRAVENOUS at 16:06

## 2018-01-10 RX ADMIN — MIDAZOLAM 2 MG: 1 INJECTION INTRAMUSCULAR; INTRAVENOUS at 15:55

## 2018-01-10 RX ADMIN — PROPOFOL 50 MG: 10 INJECTION, EMULSION INTRAVENOUS at 16:27

## 2018-01-10 RX ADMIN — PROPOFOL 60 MG: 10 INJECTION, EMULSION INTRAVENOUS at 16:03

## 2018-01-10 RX ADMIN — DEXTROSE AND SODIUM CHLORIDE 20 ML/HR: 5; 450 INJECTION, SOLUTION INTRAVENOUS at 14:51

## 2018-01-10 RX ADMIN — PROPOFOL 30 MG: 10 INJECTION, EMULSION INTRAVENOUS at 16:09

## 2018-01-10 NOTE — PLAN OF CARE
Problem: Patient Care Overview (Adult)  Goal: Plan of Care Review  Outcome: Ongoing (interventions implemented as appropriate)   01/10/18 1634   Coping/Psychosocial Response Interventions   Plan Of Care Reviewed With patient   Patient Care Overview   Progress no change   Outcome Evaluation   Outcome Summary/Follow up Plan vss       Problem: GI Endoscopy (Adult)  Goal: Signs and Symptoms of Listed Potential Problems Will be Absent or Manageable (GI Endoscopy)  Outcome: Ongoing (interventions implemented as appropriate)   01/10/18 1634   GI Endoscopy   Problems Assessed (GI Endoscopy) all   Problems Present (GI Endoscopy) none

## 2018-01-10 NOTE — ANESTHESIA PREPROCEDURE EVALUATION
Anesthesia Evaluation     Patient summary reviewed and Nursing notes reviewed   NPO Solid Status: > 8 hours  NPO Liquid Status: > 8 hours     Airway   Mallampati: II  TM distance: >3 FB  Neck ROM: full  no difficulty expected  Dental    (+) upper dentures    Pulmonary    Cardiovascular - normal exam    Patient on routine beta blocker    (+) hypertension well controlled,       Neuro/Psych  GI/Hepatic/Renal/Endo    (+)  GERD poorly controlled,     Musculoskeletal     Abdominal  - normal exam   Substance History      OB/GYN          Other          Other Comment: anemia                                        Anesthesia Plan    ASA 3     MAC     intravenous induction   Anesthetic plan and risks discussed with patient.

## 2018-01-10 NOTE — ANESTHESIA POSTPROCEDURE EVALUATION
Patient: Krystina Duong    Procedure Summary     Date Anesthesia Start Anesthesia Stop Room / Location    01/10/18 1601 1435 [facility] ENDOSCOPY 2 / [facility] ENDOSCOPY       Procedure Diagnosis Surgeon Provider    ESOPHAGOGASTRODUODENOSCOPY (N/A Esophagus); COLONOSCOPY (N/A ) Esophagitis; Hypochromic anemia  (Esophagitis [K20.9]; Hypochromic anemia [D50.9]) DO Zoë Ayala CRNA          Anesthesia Type: MAC  Last vitals  BP       Temp       Pulse       Resp         SpO2         Post Anesthesia Care and Evaluation    Patient location during evaluation: bedside  Patient participation: complete - patient participated  Level of consciousness: sleepy but conscious  Pain score: 0  Pain management: adequate  Airway patency: patent  Anesthetic complications: No anesthetic complications  PONV Status: none  Cardiovascular status: acceptable  Respiratory status: acceptable  Hydration status: acceptable

## 2018-01-10 NOTE — PLAN OF CARE
Problem: Patient Care Overview (Adult)  Goal: Plan of Care Review  Outcome: Outcome(s) achieved Date Met: 01/10/18

## 2018-01-10 NOTE — PLAN OF CARE
Problem: GI Endoscopy (Adult)  Goal: Signs and Symptoms of Listed Potential Problems Will be Absent or Manageable (GI Endoscopy)  Outcome: Outcome(s) achieved Date Met: 01/10/18

## 2018-01-10 NOTE — H&P
Justin Hurtado DO,[facility]  Gastroenterology  Hepatology  Endoscopy  Board Certified in Internal Medicine and gastroenterology  [address] [facility], [address]  [place]. [address]  - [phone]   F - [phone]     GASTROENTEROLOGY HISTORY AND PHYSICAL  NOTE   JUSTIN HURTADO DO.         SUBJECTIVE:   1/10/2018    Name: Krystina Duong  DOD: 1950        Chief Complaint:       Subjective : Acid reflux, dysphagia, anemia    Patient is 67 y.o. female presents with desire for elective EGD with possible dilation and colonoscopy.      ROS/HISTORY/ CURRENT MEDICATIONS/OBJECTIVE/VS/PE:   Review of Systems:   Review of Systems    History:     Past Medical History:   Diagnosis Date   • Anemia    • History of transfusion    • Hypertension      Past Surgical History:   Procedure Laterality Date   • CHOLECYSTECTOMY     • FRACTURE SURGERY      ankle   • HYSTERECTOMY       History reviewed. No pertinent family history.  Social History   Substance Use Topics   • Smoking status: Never Smoker   • Smokeless tobacco: Never Used   • Alcohol use No     Prescriptions Prior to Admission   Medication Sig Dispense Refill Last Dose   • atenolol (TENORMIN) 25 MG tablet Take 1 tablet by mouth two  times daily   1/9/2018 at Unknown time   • cyclobenzaprine (FLEXERIL) 10 MG tablet Take 10 mg by mouth 2 (Two) Times a Day.   1/9/2018 at Unknown time   • DULoxetine (CYMBALTA) 60 MG capsule Take 1 capsule by mouth  daily   1/9/2018 at Unknown time   • folic acid (FOLVITE) 1 MG tablet Take 1 tablet by mouth Daily. 90 tablet 1 1/9/2018 at Unknown time   • oxyCODONE-acetaminophen (PERCOCET) 7.5-325 MG per tablet Take 1 tablet by mouth.   1/9/2018 at Unknown time   • traMADol (ULTRAM) 50 MG tablet Take 50 mg by mouth Every 8 (Eight) Hours.   1/9/2018 at Unknown time   • traZODone (DESYREL) 150 MG tablet TAKE 1 TABLET BY MOUTH  NIGHTLY AS NEEDED FOR  SLEEP.   1/9/2018 at Unknown time   • vitamin B-12 (CYANOCOBALAMIN) 1000 MCG tablet  Take 1 tablet by mouth Daily. 90 tablet 1 1/9/2018 at Unknown time     Allergies:  Diphenhydramine hcl; Metoclopramide; and Promethazine    I have reviewed the patients medical history, surgical history and family history in the available medical record system.     Current Medications:     Current Facility-Administered Medications   Medication Dose Route Frequency Provider Last Rate Last Dose   • dexamethasone (DECADRON) injection 8 mg  8 mg Intravenous Once PRN Zoë Gonzalez CRNA       • dextrose 5 % and sodium chloride 0.45 % infusion  20 mL/hr Intravenous Continuous Elton Santiago DO 20 mL/hr at 01/10/18 1451 20 mL/hr at 01/10/18 1451   • meperidine (DEMEROL) injection 12.5 mg  12.5 mg Intravenous Q5 Min PRN Zoë Gonzalez CRNA       • ondansetron (ZOFRAN) injection 4 mg  4 mg Intravenous Once PRN Zoë Gonzalez CRNA           Objective     Physical Exam:        Physical Exam:  General Appearance:    Alert, cooperative, in no acute distress   Head:    Normocephalic, without obvious abnormality, atraumatic   Eyes:            Lids and lashes normal, conjunctivae and sclerae normal, no   icterus, no pallor, corneas clear, PERRLA   Ears:    Ears appear intact with no abnormalities noted   Throat:   No oral lesions, no thrush, oral mucosa moist   Neck:   No adenopathy, supple, trachea midline, no thyromegaly, no     carotid bruit, no JVD   Back:     No kyphosis present, no scoliosis present, no skin lesions,       erythema or scars, no tenderness to percussion or                   palpation,   range of motion normal   Lungs:     Clear to auscultation,respirations regular, even and                   unlabored    Heart:    Regular rhythm and normal rate, normal S1 and S2, no            murmur, no gallop, no rub, no click   Breast Exam:    Deferred   Abdomen:     Normal bowel sounds, no masses, no organomegaly, soft        non-tender, non-distended, no guarding, no rebound                 tenderness    Genitalia:    Deferred   Extremities:   Moves all extremities well, no edema, no cyanosis, no              redness   Pulses:   Pulses palpable and equal bilaterally   Skin:   No bleeding, bruising or rash   Lymph nodes:   No palpable adenopathy   Neurologic:   Cranial nerves 2 - 12 grossly intact, sensation intact, DTR        present and equal bilaterally      Results Review:     Lab Results   Component Value Date    WBC 7.25 11/14/2017    WBC 5.05 08/08/2017    WBC 7.34 04/29/2017    HGB 13.3 11/14/2017    HGB 11.4 (L) 08/08/2017    HGB 7.7 (L) 04/29/2017    HCT 40.5 11/14/2017    HCT 34.4 (L) 08/08/2017    HCT 25.8 (L) 04/29/2017     11/14/2017     08/08/2017     (H) 04/29/2017             No results found for: LIPASE  Lab Results   Component Value Date    INR 1.0 06/13/2016    INR 1.0 06/06/2015          Radiology Review:  Imaging Results (last 72 hours)     ** No results found for the last 72 hours. **           I reviewed the patient's new clinical results.  I reviewed the patient's new imaging results and agree with the interpretation.     ASSESSMENT/PLAN:   ASSESSMENT:   1.  Reflux esophagitis  2.  Dysphagia  3.  Anemia of chronic blood loss    PLAN:   1.  Esophagogastroduodenoscopy with biopsies and dilation as appropriate  2.  Colonoscopy    Risk and benefits associated with procedure are reviewed with the patient.  The patient wishes to proceed      Elton Santiago DO  01/10/18  3:49 PM

## 2018-01-12 LAB
LAB AP CASE REPORT: NORMAL
Lab: NORMAL
PATH REPORT.FINAL DX SPEC: NORMAL
PATH REPORT.GROSS SPEC: NORMAL

## 2018-01-29 RX ORDER — DULOXETIN HYDROCHLORIDE 60 MG/1
60 CAPSULE, DELAYED RELEASE ORAL DAILY
COMMUNITY

## 2018-01-29 RX ORDER — ATENOLOL 25 MG/1
25 TABLET ORAL
COMMUNITY

## 2018-01-29 RX ORDER — TRAZODONE HYDROCHLORIDE 150 MG/1
150 TABLET ORAL NIGHTLY
COMMUNITY

## 2018-01-31 ENCOUNTER — ANESTHESIA EVENT (OUTPATIENT)
Dept: GASTROENTEROLOGY | Facility: HOSPITAL | Age: 68
End: 2018-01-31

## 2018-01-31 ENCOUNTER — HOSPITAL ENCOUNTER (OUTPATIENT)
Facility: HOSPITAL | Age: 68
Setting detail: HOSPITAL OUTPATIENT SURGERY
Discharge: HOME | End: 2018-01-31
Attending: INTERNAL MEDICINE | Admitting: INTERNAL MEDICINE

## 2018-01-31 ENCOUNTER — ANESTHESIA (OUTPATIENT)
Dept: GASTROENTEROLOGY | Facility: HOSPITAL | Age: 68
End: 2018-01-31

## 2018-01-31 VITALS
TEMPERATURE: 97.6 F | HEART RATE: 79 BPM | RESPIRATION RATE: 17 BRPM | WEIGHT: 164.5 LBS | HEIGHT: 65 IN | OXYGEN SATURATION: 94 % | SYSTOLIC BLOOD PRESSURE: 134 MMHG | DIASTOLIC BLOOD PRESSURE: 69 MMHG | BODY MASS INDEX: 27.41 KG/M2

## 2018-01-31 PROCEDURE — 25010000002 PROPOFOL 10 MG/ML EMULSION: Performed by: NURSE ANESTHETIST, CERTIFIED REGISTERED

## 2018-01-31 PROCEDURE — C1726 CATH, BAL DIL, NON-VASCULAR: HCPCS | Performed by: INTERNAL MEDICINE

## 2018-01-31 PROCEDURE — 25010000002 MIDAZOLAM PER 1 MG: Performed by: NURSE ANESTHETIST, CERTIFIED REGISTERED

## 2018-01-31 RX ORDER — PROPOFOL 10 MG/ML
VIAL (ML) INTRAVENOUS AS NEEDED
Status: DISCONTINUED | OUTPATIENT
Start: 2018-01-31 | End: 2018-01-31 | Stop reason: SURG

## 2018-01-31 RX ORDER — LIDOCAINE HYDROCHLORIDE 10 MG/ML
INJECTION, SOLUTION INFILTRATION; PERINEURAL AS NEEDED
Status: DISCONTINUED | OUTPATIENT
Start: 2018-01-31 | End: 2018-01-31 | Stop reason: SURG

## 2018-01-31 RX ORDER — MIDAZOLAM HYDROCHLORIDE 1 MG/ML
INJECTION INTRAMUSCULAR; INTRAVENOUS AS NEEDED
Status: DISCONTINUED | OUTPATIENT
Start: 2018-01-31 | End: 2018-01-31 | Stop reason: SURG

## 2018-01-31 RX ORDER — LANSOPRAZOLE 30 MG/1
30 CAPSULE, DELAYED RELEASE ORAL 2 TIMES DAILY
COMMUNITY
End: 2021-05-05

## 2018-01-31 RX ORDER — DEXTROSE AND SODIUM CHLORIDE 5; .45 G/100ML; G/100ML
20 INJECTION, SOLUTION INTRAVENOUS CONTINUOUS
Status: DISCONTINUED | OUTPATIENT
Start: 2018-01-31 | End: 2018-01-31 | Stop reason: HOSPADM

## 2018-01-31 RX ORDER — SUCRALFATE 1 G/1
1 TABLET ORAL 4 TIMES DAILY
COMMUNITY
End: 2021-05-05

## 2018-01-31 RX ADMIN — PROPOFOL 30 MG: 10 INJECTION, EMULSION INTRAVENOUS at 15:22

## 2018-01-31 RX ADMIN — MIDAZOLAM 2 MG: 1 INJECTION INTRAMUSCULAR; INTRAVENOUS at 15:13

## 2018-01-31 RX ADMIN — PROPOFOL 30 MG: 10 INJECTION, EMULSION INTRAVENOUS at 15:25

## 2018-01-31 RX ADMIN — LIDOCAINE HYDROCHLORIDE 100 MG: 10 INJECTION, SOLUTION INFILTRATION; PERINEURAL at 15:17

## 2018-01-31 RX ADMIN — PROPOFOL 30 MG: 10 INJECTION, EMULSION INTRAVENOUS at 15:28

## 2018-01-31 RX ADMIN — PROPOFOL 80 MG: 10 INJECTION, EMULSION INTRAVENOUS at 15:17

## 2018-01-31 RX ADMIN — PROPOFOL 30 MG: 10 INJECTION, EMULSION INTRAVENOUS at 15:20

## 2018-01-31 RX ADMIN — DEXTROSE AND SODIUM CHLORIDE 20 ML/HR: 5; 450 INJECTION, SOLUTION INTRAVENOUS at 15:03

## 2018-01-31 NOTE — ANESTHESIA POSTPROCEDURE EVALUATION
Patient: Krystina Duong    Procedure Summary     Date Anesthesia Start Anesthesia Stop Room / Location    01/31/18 1513 1536 [facility] ENDOSCOPY 2 / [facility] ENDOSCOPY       Procedure Diagnosis Surgeon Provider    ESOPHAGOGASTRODUODENOSCOPY (N/A Esophagus) Esophageal stenosis  (Esophageal stenosis [K22.2]) DO Hansa Ayala CRNA          Anesthesia Type: MAC  Last vitals  BP   (!) 181/88 (01/31/18 1447)   Temp   97 °F (36.1 °C) (01/31/18 1447)   Pulse   61 (01/31/18 1447)   Resp   18 (01/31/18 1447)     SpO2   96 % (01/31/18 1447)     Post Anesthesia Care and Evaluation    Patient location during evaluation: bedside  Patient participation: complete - patient participated  Level of consciousness: awake and alert  Pain management: adequate  Airway patency: patent  Anesthetic complications: No anesthetic complications  PONV Status: none  Cardiovascular status: acceptable  Respiratory status: acceptable  Hydration status: acceptable

## 2018-01-31 NOTE — ANESTHESIA PREPROCEDURE EVALUATION
Anesthesia Evaluation     no history of anesthetic complications:  NPO Solid Status: > 8 hours  NPO Liquid Status: > 4 hours     Airway   Mallampati: II  TM distance: >3 FB  Neck ROM: full  small opening and possible difficult intubation  Dental    (+) upper dentures    Comment: No teeth lower, says she wears dentures but doesn't have lower set in    Pulmonary - negative pulmonary ROS and normal exam   Cardiovascular - normal exam    (+) hypertension well controlled,       Neuro/Psych- negative ROS  GI/Hepatic/Renal/Endo    (+) obesity,      Musculoskeletal     (+) back pain,   Abdominal    Substance History      OB/GYN          Other                                                Anesthesia Plan    ASA 2     MAC     intravenous induction   Anesthetic plan and risks discussed with patient.

## 2018-02-15 ENCOUNTER — APPOINTMENT (OUTPATIENT)
Dept: ONCOLOGY | Facility: HOSPITAL | Age: 68
End: 2018-02-15

## 2018-02-15 ENCOUNTER — OFFICE VISIT (OUTPATIENT)
Dept: ONCOLOGY | Facility: CLINIC | Age: 68
End: 2018-02-15

## 2018-02-15 VITALS
HEART RATE: 78 BPM | DIASTOLIC BLOOD PRESSURE: 98 MMHG | BODY MASS INDEX: 26.25 KG/M2 | HEIGHT: 66 IN | SYSTOLIC BLOOD PRESSURE: 146 MMHG | RESPIRATION RATE: 16 BRPM | WEIGHT: 163.36 LBS | TEMPERATURE: 97.9 F

## 2018-02-15 DIAGNOSIS — D50.8 OTHER IRON DEFICIENCY ANEMIA: Primary | ICD-10-CM

## 2018-02-15 PROCEDURE — 99214 OFFICE O/P EST MOD 30 MIN: CPT | Performed by: NURSE PRACTITIONER

## 2018-02-15 PROCEDURE — G0463 HOSPITAL OUTPT CLINIC VISIT: HCPCS | Performed by: NURSE PRACTITIONER

## 2018-02-15 NOTE — PROGRESS NOTES
DATE OF VISIT: 2/15/2018    REASON FOR VISIT:  Follow-up for iron deficiency anemia      HISTORY OF PRESENT ILLNESS:  68-year-old female with a past medical history significant for hypertension, history of chronic back pain secondary to vertebral fracture for many years, was initially seen in consultation here at [facility] by Dr. Martinez in April 2017 for evaluation of iron deficiency anemia.  In view of patient not being able to tolerate iron by mouth she was started on intravenous Feraheme that she received 2 dose in May 2017.  She came in for follow-up and iron levels still below normal so she was given 2 additional doses of IV iron in August. She is here today for f/u. Since here last she was seen by Dr. Santiago and EGD to dilate esophagus. She was just seen by primary care yesterday. She states her fatigue is about same; states she has had some shortness of air, primary care ordered a CXR yesterday. She denies any blood in stool or urine. On her last visit she was started on B-12 and folic acid one tablet daily.    PAST MEDICAL HISTORY:    Past Medical History:   Diagnosis Date   • Anemia    • History of transfusion    • Hypertension        SOCIAL HISTORY:    Social History   Substance Use Topics   • Smoking status: Never Smoker   • Smokeless tobacco: Never Used   • Alcohol use No       Surgical History :  Past Surgical History:   Procedure Laterality Date   • CHOLECYSTECTOMY     • COLONOSCOPY N/A 1/10/2018    Procedure: COLONOSCOPY;  Surgeon: Elton Santiago DO;  Location: [facility] ENDOSCOPY;  Service:    • ENDOSCOPY N/A 1/10/2018    Procedure: ESOPHAGOGASTRODUODENOSCOPY;  Surgeon: Elton Santiago DO;  Location: [facility] ENDOSCOPY;  Service:    • ENDOSCOPY N/A 1/31/2018    Procedure: ESOPHAGOGASTRODUODENOSCOPY;  Surgeon: Elton Santiago DO;  Location: [facility] ENDOSCOPY;  Service:    • FRACTURE SURGERY      ankle   • HYSTERECTOMY         ALLERGIES:    Allergies   Allergen Reactions   •  "Diphenhydramine Hcl    • Metoclopramide    • Promethazine        REVIEW OF SYSTEMS:      CONSTITUTIONAL:  No fever, chills, or night sweats.     HEENT:  No epistaxis, mouth sores, or difficulty swallowing.    RESPIRATORY:  No new shortness of breath or cough at present.    CARDIOVASCULAR:  No chest pain or palpitations.    GASTROINTESTINAL:  No abdominal pain, nausea, vomiting, or blood in the stool.    GENITOURINARY:  No dysuria or hematuria.    MUSCULOSKELETAL:  No any new back pain or arthralgias.     NEUROLOGICAL:  No tingling or numbness. No new headache or dizziness.     LYMPHATICS:  Denies any abnormal swollen and anywhere in the body.    SKIN:  Denies any new skin rash.    PHYSICAL EXAMINATION:      VITAL SIGNS:  /98  Pulse 78  Temp 97.9 °F (36.6 °C) (Temporal Artery )   Resp 16  Ht 166.4 cm (65.5\")  Wt 74.1 kg (163 lb 5.8 oz)  BMI 26.77 kg/m2    GENERAL:  Not in any distress.    HEENT:  Normocephalic, Atraumatic.Mild Conjunctival pallor. No icterus.  No Facial Asymmetry noted.    NECK:  No adenopathy. No JVD.    RESPIRATORY:  Fair air entry bilateral. No rhonchi or wheezing.    CARDIOVASCULAR:  S1, S2. Regular rate and rhythm. No murmur or gallop appreciated.    ABDOMEN:  Soft, obese, nontender. Bowel sounds present in all four quadrants.  No organomegaly appreciated.    EXTREMITIES:  No edema.No Calf Tenderness.    NEUROLOGIC:  Alert, awake and oriented ×3.      SKIN : No new skin lesion identified  DIAGNOSTIC DATA:    Glucose   Date Value Ref Range Status   11/14/2017 125 (H) 60 - 100 mg/dL Final     Sodium   Date Value Ref Range Status   11/14/2017 143 137 - 145 mmol/L Final     Potassium   Date Value Ref Range Status   11/14/2017 3.7 3.5 - 5.1 mmol/L Final     CO2   Date Value Ref Range Status   11/14/2017 30.0 22.0 - 31.0 mmol/L Final     Chloride   Date Value Ref Range Status   11/14/2017 101 95 - 110 mmol/L Final     Anion Gap   Date Value Ref Range Status   11/14/2017 12.0 5.0 - 15.0 " mmol/L Final     Creatinine   Date Value Ref Range Status   11/14/2017 0.97 0.50 - 1.00 mg/dL Final     BUN   Date Value Ref Range Status   11/14/2017 11 7 - 21 mg/dL Final     BUN/Creatinine Ratio   Date Value Ref Range Status   11/14/2017 11.3 7.0 - 25.0 Final     Calcium   Date Value Ref Range Status   11/14/2017 9.0 8.4 - 10.2 mg/dL Final     eGFR Non  Amer   Date Value Ref Range Status   11/14/2017 58 45 - 104 mL/min/1.73 Final     Alkaline Phosphatase   Date Value Ref Range Status   11/14/2017 56 38 - 126 U/L Final     Total Protein   Date Value Ref Range Status   11/14/2017 6.6 6.3 - 8.6 g/dL Final     ALT (SGPT)   Date Value Ref Range Status   11/14/2017 39 9 - 52 U/L Final     AST (SGOT)   Date Value Ref Range Status   11/14/2017 34 14 - 36 U/L Final     Total Bilirubin   Date Value Ref Range Status   11/14/2017 0.2 0.2 - 1.3 mg/dL Final     Albumin   Date Value Ref Range Status   11/14/2017 3.80 3.40 - 4.80 g/dL Final     Globulin   Date Value Ref Range Status   11/14/2017 2.8 2.3 - 3.5 gm/dL Final     A/G Ratio   Date Value Ref Range Status   11/14/2017 1.4 1.1 - 1.8 g/dL Final     Lab Results   Component Value Date    WBC 7.25 11/14/2017    HGB 13.3 11/14/2017    HCT 40.5 11/14/2017    MCV 95.5 11/14/2017     11/14/2017     Lab Results   Component Value Date    NEUTROABS 4.32 11/14/2017    IRON 75 11/14/2017    TIBC 271 11/14/2017    LABIRON 28 11/14/2017    FERRITIN 170.00 11/14/2017    [id number] 410 11/14/2017    FOLATE 4.44 11/14/2017     No results found for: , LABCA2, AFPTM, HCGQUANT, , CHROMGRNA, [id number], CEA, REFLABREPO]        ASSESSMENT AND PLAN:        1. Iron deficiency anemia, questionable etiology, most likely GI loss, she follows with Dr. Santiago routinely; her Hgb is stable at 13.3 and her iron studies are stable; will continue to monitor and recheck again in 3 mos. She will continue with B-12 and folic acid tablet daily.     2. Hypertension, BP today is  146/98     3. Health maintenance, she does not smoke and sees Dr. Santiago routinely.                    This document has been signed by EVAN Tobias on February 15, 2018 1:10 PM

## 2018-04-04 ENCOUNTER — ANESTHESIA (OUTPATIENT)
Dept: GASTROENTEROLOGY | Facility: HOSPITAL | Age: 68
End: 2018-04-04

## 2018-04-04 ENCOUNTER — HOSPITAL ENCOUNTER (OUTPATIENT)
Facility: HOSPITAL | Age: 68
Setting detail: HOSPITAL OUTPATIENT SURGERY
Discharge: HOME | End: 2018-04-04
Attending: INTERNAL MEDICINE | Admitting: INTERNAL MEDICINE

## 2018-04-04 ENCOUNTER — ANESTHESIA EVENT (OUTPATIENT)
Dept: GASTROENTEROLOGY | Facility: HOSPITAL | Age: 68
End: 2018-04-04

## 2018-04-04 VITALS
DIASTOLIC BLOOD PRESSURE: 59 MMHG | SYSTOLIC BLOOD PRESSURE: 110 MMHG | TEMPERATURE: 96.8 F | HEART RATE: 79 BPM | WEIGHT: 162.6 LBS | RESPIRATION RATE: 18 BRPM | HEIGHT: 66 IN | BODY MASS INDEX: 26.13 KG/M2 | OXYGEN SATURATION: 93 %

## 2018-04-04 PROCEDURE — C1726 CATH, BAL DIL, NON-VASCULAR: HCPCS | Performed by: INTERNAL MEDICINE

## 2018-04-04 PROCEDURE — 25010000002 PROPOFOL 10 MG/ML EMULSION: Performed by: NURSE ANESTHETIST, CERTIFIED REGISTERED

## 2018-04-04 RX ORDER — LIDOCAINE HYDROCHLORIDE 10 MG/ML
INJECTION, SOLUTION INFILTRATION; PERINEURAL AS NEEDED
Status: DISCONTINUED | OUTPATIENT
Start: 2018-04-04 | End: 2018-04-04 | Stop reason: SURG

## 2018-04-04 RX ORDER — PROPOFOL 10 MG/ML
VIAL (ML) INTRAVENOUS AS NEEDED
Status: DISCONTINUED | OUTPATIENT
Start: 2018-04-04 | End: 2018-04-04 | Stop reason: SURG

## 2018-04-04 RX ORDER — DEXAMETHASONE SODIUM PHOSPHATE 4 MG/ML
8 INJECTION, SOLUTION INTRA-ARTICULAR; INTRALESIONAL; INTRAMUSCULAR; INTRAVENOUS; SOFT TISSUE ONCE AS NEEDED
Status: DISCONTINUED | OUTPATIENT
Start: 2018-04-04 | End: 2018-04-04 | Stop reason: HOSPADM

## 2018-04-04 RX ORDER — ONDANSETRON 2 MG/ML
4 INJECTION INTRAMUSCULAR; INTRAVENOUS ONCE AS NEEDED
Status: DISCONTINUED | OUTPATIENT
Start: 2018-04-04 | End: 2018-04-04 | Stop reason: HOSPADM

## 2018-04-04 RX ORDER — DEXTROSE AND SODIUM CHLORIDE 5; .45 G/100ML; G/100ML
20 INJECTION, SOLUTION INTRAVENOUS CONTINUOUS
Status: DISCONTINUED | OUTPATIENT
Start: 2018-04-04 | End: 2018-04-04 | Stop reason: HOSPADM

## 2018-04-04 RX ADMIN — PROPOFOL 50 MG: 10 INJECTION, EMULSION INTRAVENOUS at 13:47

## 2018-04-04 RX ADMIN — PROPOFOL 100 MG: 10 INJECTION, EMULSION INTRAVENOUS at 13:34

## 2018-04-04 RX ADMIN — PROPOFOL 50 MG: 10 INJECTION, EMULSION INTRAVENOUS at 13:41

## 2018-04-04 RX ADMIN — DEXTROSE AND SODIUM CHLORIDE 20 ML/HR: 5; 450 INJECTION, SOLUTION INTRAVENOUS at 12:59

## 2018-04-04 RX ADMIN — PROPOFOL 50 MG: 10 INJECTION, EMULSION INTRAVENOUS at 13:37

## 2018-04-04 RX ADMIN — LIDOCAINE HYDROCHLORIDE 50 MG: 10 INJECTION, SOLUTION INFILTRATION; PERINEURAL at 13:34

## 2018-04-04 NOTE — ANESTHESIA PREPROCEDURE EVALUATION
Anesthesia Evaluation     NPO Solid Status: > 8 hours  NPO Liquid Status: > 8 hours           Airway   Mallampati: III  TM distance: >3 FB  Neck ROM: full  Dental - normal exam     Pulmonary - normal exam   Cardiovascular - normal exam        Neuro/Psych  GI/Hepatic/Renal/Endo      Musculoskeletal     Abdominal    Substance History      OB/GYN          Other                        Anesthesia Plan    ASA 3     MAC     intravenous induction   Anesthetic plan and risks discussed with patient.

## 2018-04-04 NOTE — H&P
Justin Hurtado DO,[facility]  Gastroenterology  Hepatology  Endoscopy  Board Certified in Internal Medicine and gastroenterology  [address] [facility] [address] [facility] [address], [address]  [place]. [address]  T- [phone]   F [phone]     GASTROENTEROLOGY HISTORY AND PHYSICAL  NOTE   JUSTIN HURTADO DO.         SUBJECTIVE:   4/4/2018    Name: Krystina Duong  DOD: 1950        Chief Complaint:       Subjective : High stenotic esophageal stricture.  This is being done for redilation of the esophageal stricture to maintain luminal patency.    Patient is 68 y.o. female presents with desire for elective EGD and dilation.      ROS/HISTORY/ CURRENT MEDICATIONS/OBJECTIVE/VS/PE:   Review of Systems:   Review of Systems    History:     Past Medical History:   Diagnosis Date   • Anemia    • History of transfusion    • Hypertension      Past Surgical History:   Procedure Laterality Date   • CHOLECYSTECTOMY     • COLONOSCOPY N/A 1/10/2018    Procedure: COLONOSCOPY;  Surgeon: Justin Hurtado DO;  Location: [facility] ENDOSCOPY;  Service:    • ENDOSCOPY N/A 1/10/2018    Procedure: ESOPHAGOGASTRODUODENOSCOPY;  Surgeon: Justin Hurtado DO;  Location: [facility] ENDOSCOPY;  Service:    • ENDOSCOPY N/A 1/31/2018    Procedure: ESOPHAGOGASTRODUODENOSCOPY;  Surgeon: Justin Hurtado DO;  Location: [facility] ENDOSCOPY;  Service:    • FRACTURE SURGERY      ankle   • HYSTERECTOMY       History reviewed. No pertinent family history.  Social History   Substance Use Topics   • Smoking status: Never Smoker   • Smokeless tobacco: Never Used   • Alcohol use No     Prescriptions Prior to Admission   Medication Sig Dispense Refill Last Dose   • atenolol (TENORMIN) 25 MG tablet Take 25 mg by mouth 2 (Two) Times a Day.   4/3/2018 at Unknown time   • cyclobenzaprine (FLEXERIL) 10 MG tablet Take 10 mg by mouth 2 (Two) Times a Day.   4/3/2018 at Unknown time   • DULoxetine (CYMBALTA) 60 MG capsule Take 60 mg by mouth Daily.   4/3/2018 at Unknown time   • traZODone  (DESYREL) 150 MG tablet Take 150 mg by mouth Every Night.   4/3/2018 at Unknown time   • vitamin B-12 (CYANOCOBALAMIN) 1000 MCG tablet Take 1 tablet by mouth Daily. 90 tablet 1 4/3/2018 at Unknown time   • folic acid (FOLVITE) 1 MG tablet Take 1 tablet by mouth Daily. 90 tablet 1 4/2/2018   • lansoprazole (PREVACID) 30 MG capsule Take 30 mg by mouth 2 (Two) Times a Day.   4/3/2018   • oxyCODONE-acetaminophen (PERCOCET) 7.5-325 MG per tablet Take 1 tablet by mouth Daily As Needed.   3/21/2018   • sucralfate (CARAFATE) 1 g tablet Take 1 g by mouth 4 (Four) Times a Day.   4/2/2018   • traMADol (ULTRAM) 50 MG tablet Take 50 mg by mouth Every 8 (Eight) Hours.   Taking     Allergies:  Diphenhydramine hcl; Metoclopramide; and Promethazine    I have reviewed the patients medical history, surgical history and family history in the available medical record system.     Current Medications:     Current Facility-Administered Medications   Medication Dose Route Frequency Provider Last Rate Last Dose   • dextrose 5 % and sodium chloride 0.45 % infusion  20 mL/hr Intravenous Continuous Elton Santiago DO 20 mL/hr at 04/04/18 1259 20 mL/hr at 04/04/18 1259       Objective     Physical Exam:   Temp:  [98 °F (36.7 °C)] 98 °F (36.7 °C)  Heart Rate:  [92] 92  Resp:  [18] 18  BP: (136)/(86) 136/86    Physical Exam:  General Appearance:    Alert, cooperative, in no acute distress   Head:    Normocephalic, without obvious abnormality, atraumatic   Eyes:            Lids and lashes normal, conjunctivae and sclerae normal, no   icterus, no pallor, corneas clear, PERRLA   Ears:    Ears appear intact with no abnormalities noted   Throat:   No oral lesions, no thrush, oral mucosa moist   Neck:   No adenopathy, supple, trachea midline, no thyromegaly, no     carotid bruit, no JVD   Back:     No kyphosis present, no scoliosis present, no skin lesions,       erythema or scars, no tenderness to percussion or                   palpation,   range of  motion normal   Lungs:     Clear to auscultation,respirations regular, even and                   unlabored    Heart:    Regular rhythm and normal rate, normal S1 and S2, no            murmur, no gallop, no rub, no click   Breast Exam:    Deferred   Abdomen:     Normal bowel sounds, no masses, no organomegaly, soft        non-tender, non-distended, no guarding, no rebound                 tenderness   Genitalia:    Deferred   Extremities:   Moves all extremities well, no edema, no cyanosis, no              redness   Pulses:   Pulses palpable and equal bilaterally   Skin:   No bleeding, bruising or rash   Lymph nodes:   No palpable adenopathy   Neurologic:   Cranial nerves 2 - 12 grossly intact, sensation intact, DTR        present and equal bilaterally      Results Review:     Lab Results   Component Value Date    WBC 7.25 11/14/2017    WBC 5.05 08/08/2017    WBC 7.34 04/29/2017    HGB 13.3 11/14/2017    HGB 11.4 (L) 08/08/2017    HGB 7.7 (L) 04/29/2017    HCT 40.5 11/14/2017    HCT 34.4 (L) 08/08/2017    HCT 25.8 (L) 04/29/2017     11/14/2017     08/08/2017     (H) 04/29/2017             No results found for: LIPASE  Lab Results   Component Value Date    INR 1.0 06/13/2016    INR 1.0 06/06/2015          Radiology Review:  Imaging Results (last 72 hours)     ** No results found for the last 72 hours. **           I reviewed the patient's new clinical results.  I reviewed the patient's new imaging results and agree with the interpretation.     ASSESSMENT/PLAN:   ASSESSMENT:   1.  Esophageal stricture    PLAN:   1.  Esophagogastroduodenoscopy with dilation of the esophagus    Risk and benefits associated with procedure are reviewed with the patient.  The patient wishes to proceed      Elton Santiago DO  04/04/18  1:26 PM

## 2018-04-04 NOTE — ANESTHESIA POSTPROCEDURE EVALUATION
Patient: Krystina Duong    Procedure Summary     Date:  04/04/18 Room / Location:  [facility] ENDOSCOPY 2 / [facility] ENDOSCOPY    Anesthesia Start:  1332 Anesthesia Stop:  1351    Procedure:  ESOPHAGOGASTRODUODENOSCOPY (N/A Esophagus) Diagnosis:       Esophageal stricture      Esophagitis      Hiatal hernia      (Esophageal stricture [K22.2])    Surgeon:  Elton Santiago DO Provider:  Pawel Carlson CRNA    Anesthesia Type:  MAC ASA Status:  3          Anesthesia Type: MAC  Last vitals  BP   136/86 (04/04/18 1223)   Temp   98 °F (36.7 °C) (04/04/18 1223)   Pulse   92 (04/04/18 1223)   Resp   18 (04/04/18 1223)     SpO2   98 % (04/04/18 1223)     Post Anesthesia Care and Evaluation    Patient location during evaluation: bedside  Patient participation: complete - patient cannot participate  Level of consciousness: awake  Pain score: 0  Pain management: adequate  Airway patency: patent  Anesthetic complications: No anesthetic complications  PONV Status: none  Cardiovascular status: acceptable  Respiratory status: acceptable  Hydration status: acceptable

## 2018-05-15 ENCOUNTER — APPOINTMENT (OUTPATIENT)
Dept: ONCOLOGY | Facility: HOSPITAL | Age: 68
End: 2018-05-15

## 2018-05-24 ENCOUNTER — OFFICE VISIT (OUTPATIENT)
Dept: ONCOLOGY | Facility: CLINIC | Age: 68
End: 2018-05-24

## 2018-05-24 ENCOUNTER — LAB (OUTPATIENT)
Dept: ONCOLOGY | Facility: HOSPITAL | Age: 68
End: 2018-05-24

## 2018-05-24 VITALS
HEART RATE: 66 BPM | HEIGHT: 66 IN | TEMPERATURE: 98.9 F | DIASTOLIC BLOOD PRESSURE: 92 MMHG | SYSTOLIC BLOOD PRESSURE: 142 MMHG | BODY MASS INDEX: 25.84 KG/M2 | WEIGHT: 160.8 LBS

## 2018-05-24 DIAGNOSIS — D50.8 OTHER IRON DEFICIENCY ANEMIA: ICD-10-CM

## 2018-05-24 DIAGNOSIS — D50.8 OTHER IRON DEFICIENCY ANEMIA: Primary | ICD-10-CM

## 2018-05-24 LAB
BASOPHILS # BLD AUTO: 0.04 10*3/MM3 (ref 0–0.2)
BASOPHILS NFR BLD AUTO: 0.9 % (ref 0–2)
DEPRECATED RDW RBC AUTO: 42.7 FL (ref 36.4–46.3)
EOSINOPHIL # BLD AUTO: 0.34 10*3/MM3 (ref 0–0.7)
EOSINOPHIL NFR BLD AUTO: 7.5 % (ref 0–7)
ERYTHROCYTE [DISTWIDTH] IN BLOOD BY AUTOMATED COUNT: 12.5 % (ref 11.5–14.5)
FERRITIN SERPL-MCNC: 77.2 NG/ML (ref 11.1–264)
FOLATE SERPL-MCNC: 17.2 NG/ML (ref 2.76–21)
HCT VFR BLD AUTO: 37.1 % (ref 35–45)
HGB BLD-MCNC: 12.4 G/DL (ref 12–15.5)
IMM GRANULOCYTES # BLD: 0.01 10*3/MM3 (ref 0–0.02)
IMM GRANULOCYTES NFR BLD: 0.2 % (ref 0–0.5)
IRON 24H UR-MRATE: 53 MCG/DL (ref 37–170)
IRON SATN MFR SERPL: 19 % (ref 15–50)
LYMPHOCYTES # BLD AUTO: 1.22 10*3/MM3 (ref 0.6–4.2)
LYMPHOCYTES NFR BLD AUTO: 26.9 % (ref 10–50)
MCH RBC QN AUTO: 30.9 PG (ref 26.5–34)
MCHC RBC AUTO-ENTMCNC: 33.4 G/DL (ref 31.4–36)
MCV RBC AUTO: 92.5 FL (ref 80–98)
MONOCYTES # BLD AUTO: 0.35 10*3/MM3 (ref 0–0.9)
MONOCYTES NFR BLD AUTO: 7.7 % (ref 0–12)
NEUTROPHILS # BLD AUTO: 2.57 10*3/MM3 (ref 2–8.6)
NEUTROPHILS NFR BLD AUTO: 56.8 % (ref 37–80)
NRBC BLD MANUAL-RTO: 0 /100 WBC (ref 0–0)
PLATELET # BLD AUTO: 170 10*3/MM3 (ref 150–450)
PMV BLD AUTO: 10.2 FL (ref 8–12)
RBC # BLD AUTO: 4.01 10*6/MM3 (ref 3.77–5.16)
TIBC SERPL-MCNC: 286 MCG/DL (ref 265–497)
VIT B12 BLD-MCNC: 947 PG/ML (ref 239–931)
WBC NRBC COR # BLD: 4.53 10*3/MM3 (ref 3.2–9.8)

## 2018-05-24 PROCEDURE — 83550 IRON BINDING TEST: CPT | Performed by: NURSE PRACTITIONER

## 2018-05-24 PROCEDURE — 83540 ASSAY OF IRON: CPT | Performed by: NURSE PRACTITIONER

## 2018-05-24 PROCEDURE — 82746 ASSAY OF FOLIC ACID SERUM: CPT | Performed by: NURSE PRACTITIONER

## 2018-05-24 PROCEDURE — 82728 ASSAY OF FERRITIN: CPT | Performed by: NURSE PRACTITIONER

## 2018-05-24 PROCEDURE — 99214 OFFICE O/P EST MOD 30 MIN: CPT | Performed by: NURSE PRACTITIONER

## 2018-05-24 PROCEDURE — 36415 COLL VENOUS BLD VENIPUNCTURE: CPT | Performed by: NURSE PRACTITIONER

## 2018-05-24 PROCEDURE — 85025 COMPLETE CBC W/AUTO DIFF WBC: CPT | Performed by: NURSE PRACTITIONER

## 2018-05-24 PROCEDURE — 82607 VITAMIN B-12: CPT | Performed by: NURSE PRACTITIONER

## 2018-05-24 NOTE — PROGRESS NOTES
DATE OF VISIT: 5/24/2018    REASON FOR VISIT:  Follow-up for iron deficiency anemia        HISTORY OF PRESENT ILLNESS:  68-year-old female with a past medical history significant for hypertension, history of chronic back pain secondary to vertebral fracture for many years, was initially seen in consultation here at [facility] by Dr. Martinez in April 2017 for evaluation of iron deficiency anemia.  In view of patient not being able to tolerate iron by mouth she was started on intravenous Feraheme that she received 2 dose in May 2017.  She came in for follow-up and iron levels still below normal so she was given 2 additional doses of IV iron in August. She is here today for f/u. Since here last she was seen by Dr. Santiago and EGD to dilate esophagus. She denies any blood in stool or urine. She is taking B-12 and folic acid daily.    PAST MEDICAL HISTORY:    Past Medical History:   Diagnosis Date   • Anemia    • History of transfusion    • Hypertension        SOCIAL HISTORY:    Social History   Substance Use Topics   • Smoking status: Never Smoker   • Smokeless tobacco: Never Used   • Alcohol use No       Surgical History :  Past Surgical History:   Procedure Laterality Date   • CHOLECYSTECTOMY     • COLONOSCOPY N/A 1/10/2018    Procedure: COLONOSCOPY;  Surgeon: Elton Santiago DO;  Location: [facility] ENDOSCOPY;  Service:    • ENDOSCOPY N/A 1/10/2018    Procedure: ESOPHAGOGASTRODUODENOSCOPY;  Surgeon: Elton Santiago DO;  Location: [facility] ENDOSCOPY;  Service:    • ENDOSCOPY N/A 1/31/2018    Procedure: ESOPHAGOGASTRODUODENOSCOPY;  Surgeon: Elton Santiago DO;  Location: [facility] ENDOSCOPY;  Service:    • ENDOSCOPY N/A 4/4/2018    Procedure: ESOPHAGOGASTRODUODENOSCOPY;  Surgeon: Elton Santiago DO;  Location: [facility] ENDOSCOPY;  Service: Gastroenterology   • FRACTURE SURGERY      ankle   • HYSTERECTOMY         ALLERGIES:    Allergies   Allergen Reactions   • Diphenhydramine Hcl Myalgia     Leg pain   •  "Metoclopramide Myalgia     Neck pain and drawing   • Promethazine Other (See Comments)     \"legs go wild\"       REVIEW OF SYSTEMS:      CONSTITUTIONAL:  No fever, chills, or night sweats.     HEENT:  No epistaxis, mouth sores, or difficulty swallowing.    RESPIRATORY:  No new shortness of breath or cough at present.    CARDIOVASCULAR:  No chest pain or palpitations.    GASTROINTESTINAL:  No abdominal pain, nausea, vomiting, or blood in the stool.    GENITOURINARY:  No dysuria or hematuria.    MUSCULOSKELETAL:  No any new back pain or arthralgias.     NEUROLOGICAL:  No tingling or numbness. No new headache or dizziness.     LYMPHATICS:  Denies any abnormal swollen and anywhere in the body.    SKIN:  Denies any new skin rash.    PHYSICAL EXAMINATION:      VITAL SIGNS:  /92   Pulse 66   Temp 98.9 °F (37.2 °C) (Temporal Artery )   Ht 166.4 cm (65.5\")   Wt 72.9 kg (160 lb 12.8 oz)   BMI 26.35 kg/m²     GENERAL:  Not in any distress.    HEENT:  Normocephalic, Atraumatic.Mild Conjunctival pallor. No icterus.  No Facial Asymmetry noted.    NECK:  No adenopathy. No JVD.    RESPIRATORY:  Fair air entry bilateral. No rhonchi or wheezing.    CARDIOVASCULAR:  S1, S2. Regular rate and rhythm. No murmur or gallop appreciated.    ABDOMEN:  Soft, obese, nontender. Bowel sounds present in all four quadrants.  No organomegaly appreciated.    EXTREMITIES:  No edema.No Calf Tenderness.    NEUROLOGIC:  Alert, awake and oriented ×3.     SKIN : No new skin lesion identified  DIAGNOSTIC DATA:    Glucose   Date Value Ref Range Status   11/14/2017 125 (H) 60 - 100 mg/dL Final     Sodium   Date Value Ref Range Status   11/14/2017 143 137 - 145 mmol/L Final     Potassium   Date Value Ref Range Status   11/14/2017 3.7 3.5 - 5.1 mmol/L Final     CO2   Date Value Ref Range Status   11/14/2017 30.0 22.0 - 31.0 mmol/L Final     Chloride   Date Value Ref Range Status   11/14/2017 101 95 - 110 mmol/L Final     Anion Gap   Date Value Ref " Range Status   11/14/2017 12.0 5.0 - 15.0 mmol/L Final     Creatinine   Date Value Ref Range Status   11/14/2017 0.97 0.50 - 1.00 mg/dL Final     BUN   Date Value Ref Range Status   11/14/2017 11 7 - 21 mg/dL Final     BUN/Creatinine Ratio   Date Value Ref Range Status   11/14/2017 11.3 7.0 - 25.0 Final     Calcium   Date Value Ref Range Status   11/14/2017 9.0 8.4 - 10.2 mg/dL Final     eGFR Non  Amer   Date Value Ref Range Status   11/14/2017 58 45 - 104 mL/min/1.73 Final     Alkaline Phosphatase   Date Value Ref Range Status   11/14/2017 56 38 - 126 U/L Final     Total Protein   Date Value Ref Range Status   11/14/2017 6.6 6.3 - 8.6 g/dL Final     ALT (SGPT)   Date Value Ref Range Status   11/14/2017 39 9 - 52 U/L Final     AST (SGOT)   Date Value Ref Range Status   11/14/2017 34 14 - 36 U/L Final     Total Bilirubin   Date Value Ref Range Status   11/14/2017 0.2 0.2 - 1.3 mg/dL Final     Albumin   Date Value Ref Range Status   11/14/2017 3.80 3.40 - 4.80 g/dL Final     Globulin   Date Value Ref Range Status   11/14/2017 2.8 2.3 - 3.5 gm/dL Final     A/G Ratio   Date Value Ref Range Status   11/14/2017 1.4 1.1 - 1.8 g/dL Final     Lab Results   Component Value Date    WBC 4.53 05/24/2018    HGB 12.4 05/24/2018    HCT 37.1 05/24/2018    MCV 92.5 05/24/2018     05/24/2018     Lab Results   Component Value Date    NEUTROABS 2.57 05/24/2018    IRON 75 11/14/2017    TIBC 271 11/14/2017    LABIRON 28 11/14/2017    FERRITIN 170.00 11/14/2017    XOOINIUH81 410 11/14/2017    FOLATE 4.44 11/14/2017     No results found for: , LABCA2, AFPTM, HCGQUANT, , CHROMGRNA, [id number], CEA, REFLABREPO]        ASSESSMENT AND PLAN:      1.  Iron deficiency anemia, questionable etiology, most likely GI loss, she follows with Dr. Santiago routinely; her Hgb is stable at 13.3 and her iron studies are stable; will continue to monitor and recheck again in 3 mos. She will continue with B-12 and folic acid tablet  daily.     2. Hypertension, BP today is 142/92     3. Health maintenance, she does not smoke and sees Dr. Santiago routinely.                    This document has been signed by EVAN Tobias on May 24, 2018 2:22 PM

## 2018-08-15 ENCOUNTER — ANESTHESIA (OUTPATIENT)
Dept: GASTROENTEROLOGY | Facility: HOSPITAL | Age: 68
End: 2018-08-15

## 2018-08-15 ENCOUNTER — ANESTHESIA EVENT (OUTPATIENT)
Dept: GASTROENTEROLOGY | Facility: HOSPITAL | Age: 68
End: 2018-08-15

## 2018-08-15 ENCOUNTER — HOSPITAL ENCOUNTER (OUTPATIENT)
Facility: HOSPITAL | Age: 68
Setting detail: HOSPITAL OUTPATIENT SURGERY
Discharge: HOME | End: 2018-08-15
Attending: INTERNAL MEDICINE | Admitting: INTERNAL MEDICINE

## 2018-08-15 VITALS
SYSTOLIC BLOOD PRESSURE: 100 MMHG | OXYGEN SATURATION: 95 % | TEMPERATURE: 97.3 F | DIASTOLIC BLOOD PRESSURE: 58 MMHG | RESPIRATION RATE: 18 BRPM | HEIGHT: 66 IN | WEIGHT: 151.38 LBS | HEART RATE: 76 BPM | BODY MASS INDEX: 24.33 KG/M2

## 2018-08-15 PROCEDURE — 25010000002 PROPOFOL 10 MG/ML EMULSION: Performed by: NURSE ANESTHETIST, CERTIFIED REGISTERED

## 2018-08-15 RX ORDER — DEXTROSE AND SODIUM CHLORIDE 5; .45 G/100ML; G/100ML
20 INJECTION, SOLUTION INTRAVENOUS CONTINUOUS
Status: DISCONTINUED | OUTPATIENT
Start: 2018-08-15 | End: 2018-08-15 | Stop reason: HOSPADM

## 2018-08-15 RX ORDER — LIDOCAINE HYDROCHLORIDE 10 MG/ML
INJECTION, SOLUTION INFILTRATION; PERINEURAL AS NEEDED
Status: DISCONTINUED | OUTPATIENT
Start: 2018-08-15 | End: 2018-08-15 | Stop reason: SURG

## 2018-08-15 RX ORDER — PROPOFOL 10 MG/ML
VIAL (ML) INTRAVENOUS AS NEEDED
Status: DISCONTINUED | OUTPATIENT
Start: 2018-08-15 | End: 2018-08-15 | Stop reason: SURG

## 2018-08-15 RX ADMIN — DEXTROSE AND SODIUM CHLORIDE 20 ML/HR: 5; 450 INJECTION, SOLUTION INTRAVENOUS at 13:02

## 2018-08-15 RX ADMIN — PROPOFOL 40 MG: 10 INJECTION, EMULSION INTRAVENOUS at 13:48

## 2018-08-15 RX ADMIN — PROPOFOL 80 MG: 10 INJECTION, EMULSION INTRAVENOUS at 13:43

## 2018-08-15 RX ADMIN — LIDOCAINE HYDROCHLORIDE 100 MG: 10 INJECTION, SOLUTION INFILTRATION; PERINEURAL at 13:45

## 2018-08-15 RX ADMIN — PROPOFOL 20 MG: 10 INJECTION, EMULSION INTRAVENOUS at 13:50

## 2018-08-15 RX ADMIN — PROPOFOL 40 MG: 10 INJECTION, EMULSION INTRAVENOUS at 13:45

## 2018-08-15 NOTE — H&P
Justin Hurtado DO,[facility]  Gastroenterology  Hepatology  Endoscopy  Board Certified in Internal Medicine and gastroenterology  [address], [address]  [place]. [address]  - [phone]   F - [phone]     GASTROENTEROLOGY HISTORY AND PHYSICAL  NOTE   JUSTIN HURTADO DO.         SUBJECTIVE:   8/15/2018    Name: Krystina Duong  DOD: 1950        Chief Complaint:       Subjective : Reevaluation of complex esophageal stricture.  Dysphagia is much improved.    Patient is 68 y.o. female presents with elective EGD with possible dilation.      ROS/HISTORY/ CURRENT MEDICATIONS/OBJECTIVE/VS/PE:   Review of Systems:   Review of Systems    History:     Past Medical History:   Diagnosis Date   • Anemia    • History of transfusion    • Hypertension      Past Surgical History:   Procedure Laterality Date   • CHOLECYSTECTOMY     • COLONOSCOPY N/A 1/10/2018    Procedure: COLONOSCOPY;  Surgeon: Justin Hurtado DO;  Location: [facility] ENDOSCOPY;  Service:    • ENDOSCOPY N/A 1/10/2018    Procedure: ESOPHAGOGASTRODUODENOSCOPY;  Surgeon: Justin Hurtado DO;  Location: [facility] ENDOSCOPY;  Service:    • ENDOSCOPY N/A 1/31/2018    Procedure: ESOPHAGOGASTRODUODENOSCOPY;  Surgeon: Justin Hurtado DO;  Location: [facility] ENDOSCOPY;  Service:    • ENDOSCOPY N/A 4/4/2018    Procedure: ESOPHAGOGASTRODUODENOSCOPY;  Surgeon: Justin Hurtado DO;  Location: [facility] ENDOSCOPY;  Service: Gastroenterology   • FRACTURE SURGERY      ankle   • HYSTERECTOMY     • LAPAROSCOPIC CHOLECYSTECTOMY       History reviewed. No pertinent family history.  Social History   Substance Use Topics   • Smoking status: Never Smoker   • Smokeless tobacco: Never Used   • Alcohol use No     Prescriptions Prior to Admission   Medication Sig Dispense Refill Last Dose   • atenolol (TENORMIN) 25 MG tablet Take 25 mg by mouth 2 (Two) Times a Day.   8/15/2018 at Unknown time   • cyclobenzaprine (FLEXERIL) 10 MG tablet Take 10 mg by mouth 2 (Two) Times a  Day.   Past Week at Unknown time   • DULoxetine (CYMBALTA) 60 MG capsule Take 60 mg by mouth Daily.   8/15/2018 at Unknown time   • folic acid (FOLVITE) 1 MG tablet Take 1 tablet by mouth Daily. 90 tablet 1 8/14/2018 at Unknown time   • lansoprazole (PREVACID) 30 MG capsule Take 30 mg by mouth 2 (Two) Times a Day.   8/14/2018 at Unknown time   • oxyCODONE-acetaminophen (PERCOCET) 7.5-325 MG per tablet Take 1 tablet by mouth Daily As Needed.   8/14/2018 at Unknown time   • sucralfate (CARAFATE) 1 g tablet Take 1 g by mouth 4 (Four) Times a Day.   8/14/2018 at Unknown time   • traMADol (ULTRAM) 50 MG tablet Take 50 mg by mouth Every 8 (Eight) Hours.   8/15/2018 at Unknown time   • traZODone (DESYREL) 150 MG tablet Take 150 mg by mouth Every Night.   8/14/2018 at Unknown time   • vitamin B-12 (CYANOCOBALAMIN) 1000 MCG tablet Take 1 tablet by mouth Daily. 90 tablet 1 8/14/2018 at Unknown time     Allergies:  Diphenhydramine hcl; Metoclopramide; and Promethazine    I have reviewed the patients medical history, surgical history and family history in the available medical record system.     Current Medications:     Current Facility-Administered Medications   Medication Dose Route Frequency Provider Last Rate Last Dose   • dextrose 5 % and sodium chloride 0.45 % infusion  20 mL/hr Intravenous Continuous Elton Santiago DO 20 mL/hr at 08/15/18 1302 20 mL/hr at 08/15/18 1302       Objective     Physical Exam:   Temp:  [98 °F (36.7 °C)] 98 °F (36.7 °C)  Heart Rate:  [80] 80  Resp:  [16] 16  BP: (147)/(92) 147/92    Physical Exam:  General Appearance:    Alert, cooperative, in no acute distress   Head:    Normocephalic, without obvious abnormality, atraumatic   Eyes:            Lids and lashes normal, conjunctivae and sclerae normal, no   icterus, no pallor, corneas clear, PERRLA   Ears:    Ears appear intact with no abnormalities noted   Throat:   No oral lesions, no thrush, oral mucosa moist   Neck:   No adenopathy,  supple, trachea midline, no thyromegaly, no     carotid bruit, no JVD   Back:     No kyphosis present, no scoliosis present, no skin lesions,       erythema or scars, no tenderness to percussion or                   palpation,   range of motion normal   Lungs:     Clear to auscultation,respirations regular, even and                   unlabored    Heart:    Regular rhythm and normal rate, normal S1 and S2, no            murmur, no gallop, no rub, no click   Breast Exam:    Deferred   Abdomen:     Normal bowel sounds, no masses, no organomegaly, soft        non-tender, non-distended, no guarding, no rebound                 tenderness   Genitalia:    Deferred   Extremities:   Moves all extremities well, no edema, no cyanosis, no              redness   Pulses:   Pulses palpable and equal bilaterally   Skin:   No bleeding, bruising or rash   Lymph nodes:   No palpable adenopathy   Neurologic:   Cranial nerves 2 - 12 grossly intact, sensation intact, DTR        present and equal bilaterally      Results Review:     Lab Results   Component Value Date    WBC 4.53 05/24/2018    WBC 7.25 11/14/2017    WBC 5.05 08/08/2017    HGB 12.4 05/24/2018    HGB 13.3 11/14/2017    HGB 11.4 (L) 08/08/2017    HCT 37.1 05/24/2018    HCT 40.5 11/14/2017    HCT 34.4 (L) 08/08/2017     05/24/2018     11/14/2017     08/08/2017             No results found for: LIPASE  Lab Results   Component Value Date    INR 1.0 06/13/2016    INR 1.0 06/06/2015          Radiology Review:  Imaging Results (last 72 hours)     ** No results found for the last 72 hours. **           I reviewed the patient's new clinical results.  I reviewed the patient's new imaging results and agree with the interpretation.     ASSESSMENT/PLAN:   ASSESSMENT:   1.  Esophageal stricture, complex    PLAN:   1.  Esophagogastroduodenoscopy with dilation, if required    Risk and benefits associated with the procedure are reviewed with the patient.  The patient  wishes to proceed      Elton Santiago DO  08/15/18  1:36 PM

## 2018-08-15 NOTE — ANESTHESIA POSTPROCEDURE EVALUATION
Patient: Krystina Duong    Procedure Summary     Date:  08/15/18 Room / Location:  [facility] ENDOSCOPY 2 / [facility] ENDOSCOPY    Anesthesia Start:  1340 Anesthesia Stop:  1355    Procedure:  ESOPHAGOGASTRODUODENOSCOPY (N/A ) Diagnosis:       Esophageal stricture      (Esophageal stricture [K22.2])    Surgeon:  Elton Santiago DO Provider:  Melody Torres CRNA    Anesthesia Type:  MAC ASA Status:  2          Anesthesia Type: MAC  Last vitals  BP   147/92 (08/15/18 1245)   Temp   98 °F (36.7 °C) (08/15/18 1245)   Pulse   80 (08/15/18 1245)   Resp   16 (08/15/18 1245)     SpO2   95 % (08/15/18 1245)     Post Anesthesia Care and Evaluation    Patient location during evaluation: bedside  Patient participation: waiting for patient participation  Level of consciousness: sleepy but conscious  Pain score: 0  Pain management: adequate  Airway patency: patent  Anesthetic complications: No anesthetic complications  PONV Status: none  Cardiovascular status: acceptable  Respiratory status: acceptable  Hydration status: acceptable

## 2018-08-20 RX ORDER — LANOLIN ALCOHOL/MO/W.PET/CERES
1000 CREAM (GRAM) TOPICAL DAILY
Qty: 90 TABLET | Refills: 0 | Status: SHIPPED | OUTPATIENT
Start: 2018-08-20 | End: 2018-11-16 | Stop reason: SDUPTHER

## 2018-10-24 ENCOUNTER — OFFICE VISIT (OUTPATIENT)
Dept: ONCOLOGY | Facility: CLINIC | Age: 68
End: 2018-10-24

## 2018-10-24 ENCOUNTER — LAB (OUTPATIENT)
Dept: ONCOLOGY | Facility: HOSPITAL | Age: 68
End: 2018-10-24

## 2018-10-24 VITALS
TEMPERATURE: 98.6 F | WEIGHT: 157.8 LBS | HEIGHT: 66 IN | RESPIRATION RATE: 16 BRPM | OXYGEN SATURATION: 96 % | SYSTOLIC BLOOD PRESSURE: 128 MMHG | BODY MASS INDEX: 25.36 KG/M2 | HEART RATE: 68 BPM | DIASTOLIC BLOOD PRESSURE: 70 MMHG

## 2018-10-24 DIAGNOSIS — D50.8 OTHER IRON DEFICIENCY ANEMIA: Primary | ICD-10-CM

## 2018-10-24 DIAGNOSIS — D50.8 OTHER IRON DEFICIENCY ANEMIA: ICD-10-CM

## 2018-10-24 LAB
BASOPHILS # BLD AUTO: 0.03 10*3/MM3 (ref 0–0.2)
BASOPHILS NFR BLD AUTO: 0.6 % (ref 0–2)
DEPRECATED RDW RBC AUTO: 42.9 FL (ref 36.4–46.3)
EOSINOPHIL # BLD AUTO: 0.23 10*3/MM3 (ref 0–0.7)
EOSINOPHIL NFR BLD AUTO: 4.5 % (ref 0–7)
ERYTHROCYTE [DISTWIDTH] IN BLOOD BY AUTOMATED COUNT: 12.7 % (ref 11.5–14.5)
FERRITIN SERPL-MCNC: 62.9 NG/ML (ref 11.1–264)
FOLATE SERPL-MCNC: 12.2 NG/ML (ref 2.76–21)
HCT VFR BLD AUTO: 34.1 % (ref 35–45)
HGB BLD-MCNC: 11.5 G/DL (ref 12–15.5)
IMM GRANULOCYTES # BLD: 0.01 10*3/MM3 (ref 0–0.02)
IMM GRANULOCYTES NFR BLD: 0.2 % (ref 0–0.5)
IRON 24H UR-MRATE: 39 MCG/DL (ref 37–170)
IRON SATN MFR SERPL: 12 % (ref 15–50)
LYMPHOCYTES # BLD AUTO: 1.01 10*3/MM3 (ref 0.6–4.2)
LYMPHOCYTES NFR BLD AUTO: 19.6 % (ref 10–50)
MCH RBC QN AUTO: 31.4 PG (ref 26.5–34)
MCHC RBC AUTO-ENTMCNC: 33.7 G/DL (ref 31.4–36)
MCV RBC AUTO: 93.2 FL (ref 80–98)
MONOCYTES # BLD AUTO: 0.38 10*3/MM3 (ref 0–0.9)
MONOCYTES NFR BLD AUTO: 7.4 % (ref 0–12)
NEUTROPHILS # BLD AUTO: 3.48 10*3/MM3 (ref 2–8.6)
NEUTROPHILS NFR BLD AUTO: 67.7 % (ref 37–80)
PLATELET # BLD AUTO: 184 10*3/MM3 (ref 150–450)
PMV BLD AUTO: 10.6 FL (ref 8–12)
RBC # BLD AUTO: 3.66 10*6/MM3 (ref 3.77–5.16)
TIBC SERPL-MCNC: 322 MCG/DL (ref 265–497)
VIT B12 BLD-MCNC: 895 PG/ML (ref 239–931)
WBC NRBC COR # BLD: 5.14 10*3/MM3 (ref 3.2–9.8)

## 2018-10-24 PROCEDURE — 83540 ASSAY OF IRON: CPT | Performed by: NURSE PRACTITIONER

## 2018-10-24 PROCEDURE — G0463 HOSPITAL OUTPT CLINIC VISIT: HCPCS | Performed by: NURSE PRACTITIONER

## 2018-10-24 PROCEDURE — 82607 VITAMIN B-12: CPT | Performed by: NURSE PRACTITIONER

## 2018-10-24 PROCEDURE — 85025 COMPLETE CBC W/AUTO DIFF WBC: CPT | Performed by: NURSE PRACTITIONER

## 2018-10-24 PROCEDURE — 82746 ASSAY OF FOLIC ACID SERUM: CPT | Performed by: NURSE PRACTITIONER

## 2018-10-24 PROCEDURE — 83550 IRON BINDING TEST: CPT | Performed by: NURSE PRACTITIONER

## 2018-10-24 PROCEDURE — 99213 OFFICE O/P EST LOW 20 MIN: CPT | Performed by: NURSE PRACTITIONER

## 2018-10-24 PROCEDURE — 82728 ASSAY OF FERRITIN: CPT | Performed by: NURSE PRACTITIONER

## 2018-10-24 NOTE — PROGRESS NOTES
DATE OF VISIT: 10/24/2018    REASON FOR VISIT:  Follow-up for iron deficiency anemia    HISTORY OF PRESENT ILLNESS:  68-year-old female with a past medical history significant for hypertension, history of chronic back pain secondary to vertebral fracture for many years, was initially seen in consultation here at [facility] by Dr. Martinez in April 2017 for evaluation of iron deficiency anemia.  In view of patient not being able to tolerate iron by mouth she was started on intravenous Feraheme that she received 2 dose in May 2017.  She came in for follow-up and iron levels still below normal so she was given 2 additional doses of IV iron in August. She is here today for f/u. Since here last she was seen by Dr. Santiago and EGD to dilate esophagus. She is scheduled to see him again next month. She denies any blood in stool or urine. She is taking B-12 and folic acid daily.    PAST MEDICAL HISTORY:    Past Medical History:   Diagnosis Date   • Anemia    • History of transfusion    • Hypertension        SOCIAL HISTORY:    Social History   Substance Use Topics   • Smoking status: Never Smoker   • Smokeless tobacco: Never Used   • Alcohol use No       Surgical History :  Past Surgical History:   Procedure Laterality Date   • CHOLECYSTECTOMY     • COLONOSCOPY N/A 1/10/2018    Procedure: COLONOSCOPY;  Surgeon: Elton Santiago DO;  Location: [facility] ENDOSCOPY;  Service:    • ENDOSCOPY N/A 1/10/2018    Procedure: ESOPHAGOGASTRODUODENOSCOPY;  Surgeon: Elton Santiago DO;  Location: [facility] ENDOSCOPY;  Service:    • ENDOSCOPY N/A 1/31/2018    Procedure: ESOPHAGOGASTRODUODENOSCOPY;  Surgeon: Elton Santiago DO;  Location: [facility] ENDOSCOPY;  Service:    • ENDOSCOPY N/A 4/4/2018    Procedure: ESOPHAGOGASTRODUODENOSCOPY;  Surgeon: Elton Santiago DO;  Location: [facility] ENDOSCOPY;  Service: Gastroenterology   • ENDOSCOPY N/A 8/15/2018    Procedure: ESOPHAGOGASTRODUODENOSCOPY;  Surgeon: Elton Santiago DO;  Location:  "[facility] ENDOSCOPY;  Service: Gastroenterology   • FRACTURE SURGERY      ankle   • HYSTERECTOMY     • LAPAROSCOPIC CHOLECYSTECTOMY         ALLERGIES:    Allergies   Allergen Reactions   • Diphenhydramine Hcl Myalgia     Leg pain   • Metoclopramide Myalgia     Neck pain and drawing   • Promethazine Other (See Comments)     \"legs go wild\"       REVIEW OF SYSTEMS:      CONSTITUTIONAL:  No fever, chills, or night sweats.     HEENT:  No epistaxis, mouth sores, or difficulty swallowing.    RESPIRATORY:  No new shortness of breath or cough at present.    CARDIOVASCULAR:  No chest pain or palpitations.    GASTROINTESTINAL:  No abdominal pain, nausea, vomiting, or blood in the stool.    GENITOURINARY:  No dysuria or hematuria.    MUSCULOSKELETAL:  No any new back pain or arthralgias.     NEUROLOGICAL:  No tingling or numbness. No new headache or dizziness.     LYMPHATICS:  Denies any abnormal swollen and anywhere in the body.    SKIN:  Denies any new skin rash.    PHYSICAL EXAMINATION:      VITAL SIGNS:  /70   Pulse 68   Temp 98.6 °F (37 °C) (Temporal Artery )   Resp 16   Ht 166.4 cm (65.51\")   Wt 71.6 kg (157 lb 12.8 oz)   SpO2 96%   BMI 25.85 kg/m²     GENERAL:  Not in any distress.    HEENT:  Normocephalic, Atraumatic.Mild Conjunctival pallor. No icterus.  No Facial Asymmetry noted.    NECK:  No adenopathy. No JVD.    RESPIRATORY:  Fair air entry bilateral. No rhonchi or wheezing.    CARDIOVASCULAR:  S1, S2. Regular rate and rhythm. No murmur or gallop appreciated.    ABDOMEN:  Soft, obese, nontender. Bowel sounds present in all four quadrants.  No organomegaly appreciated.    EXTREMITIES:  No edema.No Calf Tenderness.    NEUROLOGIC:  Alert, awake and oriented ×3.    SKIN : No new skin lesion identified  DIAGNOSTIC DATA:    Glucose   Date Value Ref Range Status   11/14/2017 125 (H) 60 - 100 mg/dL Final     Sodium   Date Value Ref Range Status   11/14/2017 143 137 - 145 mmol/L Final     Potassium   Date Value " Ref Range Status   11/14/2017 3.7 3.5 - 5.1 mmol/L Final     CO2   Date Value Ref Range Status   11/14/2017 30.0 22.0 - 31.0 mmol/L Final     Chloride   Date Value Ref Range Status   11/14/2017 101 95 - 110 mmol/L Final     Anion Gap   Date Value Ref Range Status   11/14/2017 12.0 5.0 - 15.0 mmol/L Final     Creatinine   Date Value Ref Range Status   11/14/2017 0.97 0.50 - 1.00 mg/dL Final     BUN   Date Value Ref Range Status   11/14/2017 11 7 - 21 mg/dL Final     BUN/Creatinine Ratio   Date Value Ref Range Status   11/14/2017 11.3 7.0 - 25.0 Final     Calcium   Date Value Ref Range Status   11/14/2017 9.0 8.4 - 10.2 mg/dL Final     eGFR Non  Amer   Date Value Ref Range Status   11/14/2017 58 45 - 104 mL/min/1.73 Final     Alkaline Phosphatase   Date Value Ref Range Status   11/14/2017 56 38 - 126 U/L Final     Total Protein   Date Value Ref Range Status   11/14/2017 6.6 6.3 - 8.6 g/dL Final     ALT (SGPT)   Date Value Ref Range Status   11/14/2017 39 9 - 52 U/L Final     AST (SGOT)   Date Value Ref Range Status   11/14/2017 34 14 - 36 U/L Final     Total Bilirubin   Date Value Ref Range Status   11/14/2017 0.2 0.2 - 1.3 mg/dL Final     Albumin   Date Value Ref Range Status   11/14/2017 3.80 3.40 - 4.80 g/dL Final     Globulin   Date Value Ref Range Status   11/14/2017 2.8 2.3 - 3.5 gm/dL Final     Lab Results   Component Value Date    WBC 5.14 10/24/2018    HGB 11.5 (L) 10/24/2018    HCT 34.1 (L) 10/24/2018    MCV 93.2 10/24/2018     10/24/2018     Lab Results   Component Value Date    NEUTROABS 3.48 10/24/2018    IRON 39 10/24/2018    TIBC 322 10/24/2018    LABIRON 12 (L) 10/24/2018    FERRITIN 77.20 05/24/2018    [id number] 947 (H) 05/24/2018    FOLATE 17.20 05/24/2018     No results found for: , LABCA2, AFPTM, HCGQUANT, , CHROMGRNA, [id number], CEA, REFLABREPO]        ASSESSMENT AND PLAN:      1. Iron deficiency anemia, questionable etiology, most likely GI loss, she follows with   Jack routinely; her Hgb has dropped to 11.5; iron studies pending; if she is developing iron deficiency again she may need to be considered for capsule endoscopy; she has had colonoscopy and EGD in past.  Will continue to monitor her iron studies. She will continue with folic acid and B-12 tablet daily.     2. Hypertension, BP today is 128/70     3. Health maintenance, she does not smoke and sees Dr. Santiago routinely. I have asked her to discuss with him regarding capsule endoscopy study when she sees him next month.        This document has been signed by EVAN Tobias on October 24, 2018 2:26 PM

## 2018-11-16 RX ORDER — LANOLIN ALCOHOL/MO/W.PET/CERES
1000 CREAM (GRAM) TOPICAL DAILY
Qty: 90 TABLET | Refills: 0 | Status: SHIPPED | OUTPATIENT
Start: 2018-11-16 | End: 2020-01-31 | Stop reason: SDUPTHER

## 2019-01-23 ENCOUNTER — LAB (OUTPATIENT)
Dept: ONCOLOGY | Facility: HOSPITAL | Age: 69
End: 2019-01-23

## 2019-01-23 ENCOUNTER — OFFICE VISIT (OUTPATIENT)
Dept: ONCOLOGY | Facility: CLINIC | Age: 69
End: 2019-01-23

## 2019-01-23 VITALS
BODY MASS INDEX: 25.23 KG/M2 | SYSTOLIC BLOOD PRESSURE: 107 MMHG | HEART RATE: 83 BPM | WEIGHT: 157 LBS | HEIGHT: 66 IN | TEMPERATURE: 98.3 F | RESPIRATION RATE: 16 BRPM | OXYGEN SATURATION: 98 % | DIASTOLIC BLOOD PRESSURE: 56 MMHG

## 2019-01-23 DIAGNOSIS — D50.8 IRON DEFICIENCY ANEMIA SECONDARY TO INADEQUATE DIETARY IRON INTAKE: Primary | ICD-10-CM

## 2019-01-23 DIAGNOSIS — D50.8 OTHER IRON DEFICIENCY ANEMIA: ICD-10-CM

## 2019-01-23 LAB
ALBUMIN SERPL-MCNC: 3.7 G/DL (ref 3.4–4.8)
ALBUMIN/GLOB SERPL: 1.9 G/DL (ref 1.1–1.8)
ALP SERPL-CCNC: 50 U/L (ref 38–126)
ALT SERPL W P-5'-P-CCNC: 13 U/L (ref 9–52)
ANION GAP SERPL CALCULATED.3IONS-SCNC: 8 MMOL/L (ref 5–15)
AST SERPL-CCNC: 27 U/L (ref 14–36)
BASOPHILS # BLD AUTO: 0.03 10*3/MM3 (ref 0–0.2)
BASOPHILS NFR BLD AUTO: 0.5 % (ref 0–2)
BILIRUB SERPL-MCNC: 0.3 MG/DL (ref 0.2–1.3)
BUN BLD-MCNC: 12 MG/DL (ref 7–21)
BUN/CREAT SERPL: 12.1 (ref 7–25)
CALCIUM SPEC-SCNC: 8.9 MG/DL (ref 8.4–10.2)
CHLORIDE SERPL-SCNC: 102 MMOL/L (ref 95–110)
CO2 SERPL-SCNC: 26 MMOL/L (ref 22–31)
CREAT BLD-MCNC: 0.99 MG/DL (ref 0.5–1)
DEPRECATED RDW RBC AUTO: 41.8 FL (ref 36.4–46.3)
EOSINOPHIL # BLD AUTO: 0.29 10*3/MM3 (ref 0–0.7)
EOSINOPHIL NFR BLD AUTO: 4.7 % (ref 0–7)
ERYTHROCYTE [DISTWIDTH] IN BLOOD BY AUTOMATED COUNT: 12.3 % (ref 11.5–14.5)
FERRITIN SERPL-MCNC: 23.9 NG/ML (ref 11.1–264)
FOLATE SERPL-MCNC: 7.75 NG/ML (ref 2.76–21)
GFR SERPL CREATININE-BSD FRML MDRD: 56 ML/MIN/1.73 (ref 45–104)
GLOBULIN UR ELPH-MCNC: 2 GM/DL (ref 2.3–3.5)
GLUCOSE BLD-MCNC: 155 MG/DL (ref 60–100)
HCT VFR BLD AUTO: 36.1 % (ref 35–45)
HGB BLD-MCNC: 12.1 G/DL (ref 12–15.5)
IMM GRANULOCYTES # BLD AUTO: 0.02 10*3/MM3 (ref 0–0.02)
IMM GRANULOCYTES NFR BLD AUTO: 0.3 % (ref 0–0.5)
IRON 24H UR-MRATE: 37 MCG/DL (ref 37–170)
IRON SATN MFR SERPL: 11 % (ref 15–50)
LYMPHOCYTES # BLD AUTO: 1.03 10*3/MM3 (ref 0.6–4.2)
LYMPHOCYTES NFR BLD AUTO: 16.8 % (ref 10–50)
MCH RBC QN AUTO: 31 PG (ref 26.5–34)
MCHC RBC AUTO-ENTMCNC: 33.5 G/DL (ref 31.4–36)
MCV RBC AUTO: 92.6 FL (ref 80–98)
MONOCYTES # BLD AUTO: 0.51 10*3/MM3 (ref 0–0.9)
MONOCYTES NFR BLD AUTO: 8.3 % (ref 0–12)
NEUTROPHILS # BLD AUTO: 4.26 10*3/MM3 (ref 2–8.6)
NEUTROPHILS NFR BLD AUTO: 69.4 % (ref 37–80)
PLATELET # BLD AUTO: 193 10*3/MM3 (ref 150–450)
PMV BLD AUTO: 10.2 FL (ref 8–12)
POTASSIUM BLD-SCNC: 3.4 MMOL/L (ref 3.5–5.1)
PROT SERPL-MCNC: 5.7 G/DL (ref 6.3–8.6)
RBC # BLD AUTO: 3.9 10*6/MM3 (ref 3.77–5.16)
SODIUM BLD-SCNC: 136 MMOL/L (ref 137–145)
TIBC SERPL-MCNC: 332 MCG/DL (ref 265–497)
VIT B12 BLD-MCNC: 578 PG/ML (ref 239–931)
WBC NRBC COR # BLD: 6.14 10*3/MM3 (ref 3.2–9.8)

## 2019-01-23 PROCEDURE — 80053 COMPREHEN METABOLIC PANEL: CPT | Performed by: NURSE PRACTITIONER

## 2019-01-23 PROCEDURE — 99213 OFFICE O/P EST LOW 20 MIN: CPT | Performed by: NURSE PRACTITIONER

## 2019-01-23 PROCEDURE — G0463 HOSPITAL OUTPT CLINIC VISIT: HCPCS | Performed by: NURSE PRACTITIONER

## 2019-01-23 PROCEDURE — 85025 COMPLETE CBC W/AUTO DIFF WBC: CPT | Performed by: NURSE PRACTITIONER

## 2019-01-23 PROCEDURE — 83550 IRON BINDING TEST: CPT | Performed by: NURSE PRACTITIONER

## 2019-01-23 PROCEDURE — 82746 ASSAY OF FOLIC ACID SERUM: CPT | Performed by: NURSE PRACTITIONER

## 2019-01-23 PROCEDURE — 82728 ASSAY OF FERRITIN: CPT | Performed by: NURSE PRACTITIONER

## 2019-01-23 PROCEDURE — 82607 VITAMIN B-12: CPT | Performed by: NURSE PRACTITIONER

## 2019-01-23 PROCEDURE — 83540 ASSAY OF IRON: CPT | Performed by: NURSE PRACTITIONER

## 2019-01-23 NOTE — PROGRESS NOTES
DATE OF VISIT: 1/23/2019    REASON FOR VISIT:  Follow-up for iron deficiency    HISTORY OF PRESENT ILLNESS: 68-year-old female with a past medical history significant for hypertension, history of chronic back pain secondary to vertebral fracture for many years, was initially seen in consultation here at [facility] by Dr. Martinez in April 2017 for evaluation of iron deficiency anemia.  In view of patient not being able to tolerate iron by mouth she was started on intravenous Feraheme that she received 2 dose in May 2017.  She came in for follow-up and iron levels still below normal so she was given 2 additional doses of IV iron in August. She is here today for f/u. She had EGD with esophageal dilatation in August.  She denies any blood in stool or urine. She is taking B-12 and folic acid daily.           PAST MEDICAL HISTORY:    Past Medical History:   Diagnosis Date   • Anemia    • History of transfusion    • Hypertension        SOCIAL HISTORY:    Social History     Tobacco Use   • Smoking status: Never Smoker   • Smokeless tobacco: Never Used   Substance Use Topics   • Alcohol use: No   • Drug use: No       Surgical History :  Past Surgical History:   Procedure Laterality Date   • CHOLECYSTECTOMY     • COLONOSCOPY N/A 1/10/2018    Procedure: COLONOSCOPY;  Surgeon: Elton Santiago DO;  Location: [facility] ENDOSCOPY;  Service:    • ENDOSCOPY N/A 1/10/2018    Procedure: ESOPHAGOGASTRODUODENOSCOPY;  Surgeon: Elton Santiago DO;  Location: [facility] ENDOSCOPY;  Service:    • ENDOSCOPY N/A 1/31/2018    Procedure: ESOPHAGOGASTRODUODENOSCOPY;  Surgeon: Elton Santiago DO;  Location: [facility] ENDOSCOPY;  Service:    • ENDOSCOPY N/A 4/4/2018    Procedure: ESOPHAGOGASTRODUODENOSCOPY;  Surgeon: Elton Santiago DO;  Location: [facility] ENDOSCOPY;  Service: Gastroenterology   • ENDOSCOPY N/A 8/15/2018    Procedure: ESOPHAGOGASTRODUODENOSCOPY;  Surgeon: Elton Santiago DO;  Location: [facility] ENDOSCOPY;  Service:  "Gastroenterology   • FRACTURE SURGERY      ankle   • HYSTERECTOMY     • LAPAROSCOPIC CHOLECYSTECTOMY         ALLERGIES:    Allergies   Allergen Reactions   • Diphenhydramine Hcl Myalgia     Leg pain   • Metoclopramide Myalgia     Neck pain and drawing   • Promethazine Other (See Comments)     \"legs go wild\"       REVIEW OF SYSTEMS:      CONSTITUTIONAL:  No fever, chills, or night sweats.     HEENT:  No epistaxis, mouth sores, or difficulty swallowing.    RESPIRATORY:  No new shortness of breath or cough at present.    CARDIOVASCULAR:  No chest pain or palpitations.    GASTROINTESTINAL:  No abdominal pain, nausea, vomiting, or blood in the stool.    GENITOURINARY:  No dysuria or hematuria.    MUSCULOSKELETAL:  No any new back pain or arthralgias.     NEUROLOGICAL:  No tingling or numbness. No new headache or dizziness.     LYMPHATICS:  Denies any abnormal swollen and anywhere in the body.    SKIN:  Denies any new skin rash.    PHYSICAL EXAMINATION:      VITAL SIGNS:  /56   Pulse 83   Temp 98.3 °F (36.8 °C) (Temporal)   Resp 16   Ht 166.4 cm (65.51\")   Wt 71.2 kg (157 lb)   SpO2 98%   BMI 25.72 kg/m²     GENERAL:  Not in any distress.    HEENT:  Normocephalic, Atraumatic.Mild Conjunctival pallor. No icterus. No Facial Asymmetry noted.    NECK:  No adenopathy. No JVD.    RESPIRATORY:  Fair air entry bilateral. No rhonchi or wheezing.    CARDIOVASCULAR:  S1, S2. Regular rate and rhythm. No murmur or gallop appreciated.    ABDOMEN:  Soft, obese, nontender. Bowel sounds present in all four quadrants.  No organomegaly appreciated.    EXTREMITIES:  No edema.No Calf Tenderness.    NEUROLOGIC:  Alert, awake and oriented ×3.      SKIN : No new skin lesion identified  DIAGNOSTIC DATA:    Glucose   Date Value Ref Range Status   01/23/2019 155 (H) 60 - 100 mg/dL Final     Sodium   Date Value Ref Range Status   01/23/2019 136 (L) 137 - 145 mmol/L Final     Potassium   Date Value Ref Range Status   01/23/2019 3.4 (L) " 3.5 - 5.1 mmol/L Final     CO2   Date Value Ref Range Status   01/23/2019 26.0 22.0 - 31.0 mmol/L Final     Chloride   Date Value Ref Range Status   01/23/2019 102 95 - 110 mmol/L Final     Anion Gap   Date Value Ref Range Status   01/23/2019 8.0 5.0 - 15.0 mmol/L Final     Creatinine   Date Value Ref Range Status   01/23/2019 0.99 0.50 - 1.00 mg/dL Final     BUN   Date Value Ref Range Status   01/23/2019 12 7 - 21 mg/dL Final     BUN/Creatinine Ratio   Date Value Ref Range Status   01/23/2019 12.1 7.0 - 25.0 Final     Calcium   Date Value Ref Range Status   01/23/2019 8.9 8.4 - 10.2 mg/dL Final     eGFR Non  Amer   Date Value Ref Range Status   01/23/2019 56 45 - 104 mL/min/1.73 Final     Alkaline Phosphatase   Date Value Ref Range Status   01/23/2019 50 38 - 126 U/L Final     Total Protein   Date Value Ref Range Status   01/23/2019 5.7 (L) 6.3 - 8.6 g/dL Final     ALT (SGPT)   Date Value Ref Range Status   01/23/2019 13 9 - 52 U/L Final     AST (SGOT)   Date Value Ref Range Status   01/23/2019 27 14 - 36 U/L Final     Total Bilirubin   Date Value Ref Range Status   01/23/2019 0.3 0.2 - 1.3 mg/dL Final     Albumin   Date Value Ref Range Status   01/23/2019 3.70 3.40 - 4.80 g/dL Final     Globulin   Date Value Ref Range Status   01/23/2019 2.0 (L) 2.3 - 3.5 gm/dL Final     Lab Results   Component Value Date    WBC 6.14 01/23/2019    HGB 12.1 01/23/2019    HCT 36.1 01/23/2019    MCV 92.6 01/23/2019     01/23/2019     Lab Results   Component Value Date    NEUTROABS 4.26 01/23/2019    IRON 39 10/24/2018    TIBC 322 10/24/2018    LABIRON 12 (L) 10/24/2018    FERRITIN 62.90 10/24/2018    [id number] 10/24/2018    FOLATE 12.20 10/24/2018     No results found for: , LABCA2, AFPTM, HCGQUANT, , CHROMGRNA, [id number], CEA, REFLABREPO]        ASSESSMENT AND PLAN:       1. Iron deficiency anemia, questionable etiology, most likely GI loss, she follows with Dr. Santiago routinely; her Hgb is stable at  12.4; repeat iron studies pending; will continue to monitor and recheck again in 6 mos. She will continue with B-12 and folic acid tablet daily.     2. Hypertension, BP today is 107/56     3. Health maintenance, she does not smoke and sees Dr. Santiago routinely.                   This document has been signed by EVAN Tobias on January 23, 2019 1:54 PM

## 2019-01-25 ENCOUNTER — TELEPHONE (OUTPATIENT)
Dept: ONCOLOGY | Facility: HOSPITAL | Age: 69
End: 2019-01-25

## 2019-01-25 RX ORDER — FOLIC ACID 1 MG/1
1 TABLET ORAL DAILY
Qty: 90 TABLET | Refills: 1 | Status: SHIPPED | OUTPATIENT
Start: 2019-01-25 | End: 2020-01-31 | Stop reason: SDUPTHER

## 2019-01-25 RX ORDER — SODIUM CHLORIDE 9 MG/ML
250 INJECTION, SOLUTION INTRAVENOUS ONCE
Status: CANCELLED | OUTPATIENT
Start: 2019-01-25

## 2019-01-25 NOTE — TELEPHONE ENCOUNTER
Called patient's  and informed him. He states he will have patient call us back for appointment times. PAR team to schedule those infusions and will mail appointments to patient.

## 2019-01-25 NOTE — TELEPHONE ENCOUNTER
Janet Cline, Analisa Rodriguez, RN             Let her know that her iron level is dropping and I am putting in plan for IV iron. Also sent her new prescription for folic acid to her pharmacy     Thanks   Janet            Left message with patient's  to call us back.

## 2019-01-31 ENCOUNTER — INFUSION (OUTPATIENT)
Dept: ONCOLOGY | Facility: HOSPITAL | Age: 69
End: 2019-01-31

## 2019-01-31 VITALS
RESPIRATION RATE: 18 BRPM | HEART RATE: 85 BPM | DIASTOLIC BLOOD PRESSURE: 53 MMHG | SYSTOLIC BLOOD PRESSURE: 108 MMHG | TEMPERATURE: 98.1 F

## 2019-01-31 DIAGNOSIS — D50.8 IRON DEFICIENCY ANEMIA SECONDARY TO INADEQUATE DIETARY IRON INTAKE: Primary | ICD-10-CM

## 2019-01-31 PROCEDURE — 25010000002 FERUMOXYTOL 510 MG/17ML SOLUTION 510 MG VIAL: Performed by: NURSE PRACTITIONER

## 2019-01-31 PROCEDURE — 96374 THER/PROPH/DIAG INJ IV PUSH: CPT | Performed by: NURSE PRACTITIONER

## 2019-01-31 RX ORDER — SODIUM CHLORIDE 9 MG/ML
250 INJECTION, SOLUTION INTRAVENOUS ONCE
Status: CANCELLED | OUTPATIENT
Start: 2019-01-31

## 2019-01-31 RX ORDER — SODIUM CHLORIDE 9 MG/ML
250 INJECTION, SOLUTION INTRAVENOUS ONCE
Status: COMPLETED | OUTPATIENT
Start: 2019-01-31 | End: 2019-01-31

## 2019-01-31 RX ADMIN — FERUMOXYTOL 510 MG: 510 INJECTION INTRAVENOUS at 13:47

## 2019-01-31 RX ADMIN — SODIUM CHLORIDE 250 ML: 9 INJECTION, SOLUTION INTRAVENOUS at 13:47

## 2019-02-07 ENCOUNTER — INFUSION (OUTPATIENT)
Dept: ONCOLOGY | Facility: HOSPITAL | Age: 69
End: 2019-02-07

## 2019-02-07 VITALS
RESPIRATION RATE: 18 BRPM | SYSTOLIC BLOOD PRESSURE: 119 MMHG | HEART RATE: 77 BPM | DIASTOLIC BLOOD PRESSURE: 66 MMHG | TEMPERATURE: 97.3 F

## 2019-02-07 DIAGNOSIS — D50.8 IRON DEFICIENCY ANEMIA SECONDARY TO INADEQUATE DIETARY IRON INTAKE: Primary | ICD-10-CM

## 2019-02-07 PROCEDURE — 96374 THER/PROPH/DIAG INJ IV PUSH: CPT | Performed by: NURSE PRACTITIONER

## 2019-02-07 PROCEDURE — 25010000002 FERUMOXYTOL 510 MG/17ML SOLUTION 510 MG VIAL: Performed by: NURSE PRACTITIONER

## 2019-02-07 RX ORDER — SODIUM CHLORIDE 9 MG/ML
250 INJECTION, SOLUTION INTRAVENOUS ONCE
Status: CANCELLED | OUTPATIENT
Start: 2019-02-07

## 2019-02-07 RX ORDER — SODIUM CHLORIDE 9 MG/ML
250 INJECTION, SOLUTION INTRAVENOUS ONCE
Status: COMPLETED | OUTPATIENT
Start: 2019-02-07 | End: 2019-02-07

## 2019-02-07 RX ADMIN — FERUMOXYTOL 510 MG: 510 INJECTION INTRAVENOUS at 14:25

## 2019-02-07 RX ADMIN — SODIUM CHLORIDE 250 ML: 9 INJECTION, SOLUTION INTRAVENOUS at 13:59

## 2019-07-07 PROCEDURE — 99283 EMERGENCY DEPT VISIT LOW MDM: CPT

## 2019-07-08 ENCOUNTER — HOSPITAL ENCOUNTER (EMERGENCY)
Facility: HOSPITAL | Age: 69
Discharge: HOME | End: 2019-07-08
Attending: FAMILY MEDICINE | Admitting: FAMILY MEDICINE

## 2019-07-08 VITALS
HEART RATE: 77 BPM | OXYGEN SATURATION: 97 % | DIASTOLIC BLOOD PRESSURE: 69 MMHG | SYSTOLIC BLOOD PRESSURE: 150 MMHG | HEIGHT: 65 IN | TEMPERATURE: 97.5 F | BODY MASS INDEX: 25.16 KG/M2 | RESPIRATION RATE: 20 BRPM | WEIGHT: 151 LBS

## 2019-07-08 DIAGNOSIS — M54.50 BILATERAL LOW BACK PAIN WITHOUT SCIATICA, UNSPECIFIED CHRONICITY: Primary | ICD-10-CM

## 2019-07-08 PROCEDURE — 96372 THER/PROPH/DIAG INJ SC/IM: CPT

## 2019-07-08 PROCEDURE — 25010000002 MORPHINE PER 10 MG: Performed by: FAMILY MEDICINE

## 2019-07-08 RX ORDER — DICLOFENAC SODIUM 75 MG/1
75 TABLET, DELAYED RELEASE ORAL 2 TIMES DAILY
Qty: 14 TABLET | Refills: 0 | Status: SHIPPED | OUTPATIENT
Start: 2019-07-08 | End: 2021-05-05

## 2019-07-08 RX ADMIN — MORPHINE SULFATE 4 MG: 4 INJECTION INTRAVENOUS at 04:59

## 2019-07-08 NOTE — ED NOTES
Pt states she had back sx in 2008; reports her pain is more intense and has kept her up the past 3 nights; reports bilateral leg pain L>R which shoots down the back of her leg.     Antoine Canales RN  07/08/19 [id number]

## 2019-07-08 NOTE — ED PROVIDER NOTES
Subjective   69-year-old white female presents the emergency department with complaint of low back pain.  She states that she has chronic back pain and takes Percocet every day for this.  However, this pain is slightly different and is in her low back on both sides.  She states that she has some pain radiating down the back of her left leg to just behind her knee.  She is having normal bowel movements.  She is urinating normally.  She states that her legs otherwise feel normal and her strength is appropriate.  She has no loss of sensation or lower extremity's.  She is afebrile.  She sees pain management for her pains.            Review of Systems   Constitutional: Negative for activity change, appetite change, chills, fatigue, fever and unexpected weight change.   HENT: Negative for nosebleeds, rhinorrhea, sore throat, trouble swallowing and voice change.    Eyes: Negative for photophobia, pain and visual disturbance.   Respiratory: Negative for apnea, cough, chest tightness, shortness of breath, wheezing and stridor.    Cardiovascular: Negative for chest pain, palpitations and leg swelling.   Gastrointestinal: Negative for abdominal distention, abdominal pain, blood in stool, constipation, diarrhea, nausea and vomiting.   Endocrine: Negative for cold intolerance, heat intolerance, polydipsia and polyuria.   Genitourinary: Negative for decreased urine volume, difficulty urinating, dysuria, flank pain, hematuria and urgency.   Musculoskeletal: Positive for back pain. Negative for arthralgias, myalgias, neck pain and neck stiffness.   Skin: Negative for color change, pallor and rash.   Allergic/Immunologic: Negative for immunocompromised state.   Neurological: Negative for dizziness, seizures, syncope, weakness, light-headedness and numbness.   Hematological: Negative for adenopathy.   Psychiatric/Behavioral: Negative for agitation, confusion, dysphoric mood and suicidal ideas. The patient is not nervous/anxious.   "      Past Medical History:   Diagnosis Date   • Anemia    • History of transfusion    • Hypertension        Allergies   Allergen Reactions   • Diphenhydramine Hcl Myalgia     Leg pain   • Metoclopramide Myalgia     Neck pain and drawing   • Promethazine Other (See Comments)     \"legs go wild\"       Past Surgical History:   Procedure Laterality Date   • CHOLECYSTECTOMY     • COLONOSCOPY N/A 1/10/2018    Procedure: COLONOSCOPY;  Surgeon: Elton Santiago DO;  Location: [facility] ENDOSCOPY;  Service:    • ENDOSCOPY N/A 1/10/2018    Procedure: ESOPHAGOGASTRODUODENOSCOPY;  Surgeon: Elton Santiago DO;  Location: [facility] ENDOSCOPY;  Service:    • ENDOSCOPY N/A 1/31/2018    Procedure: ESOPHAGOGASTRODUODENOSCOPY;  Surgeon: Elton Santiago DO;  Location: [facility] ENDOSCOPY;  Service:    • ENDOSCOPY N/A 4/4/2018    Procedure: ESOPHAGOGASTRODUODENOSCOPY;  Surgeon: Elton Santiago DO;  Location: [facility] ENDOSCOPY;  Service: Gastroenterology   • ENDOSCOPY N/A 8/15/2018    Procedure: ESOPHAGOGASTRODUODENOSCOPY;  Surgeon: Elton Santiago DO;  Location: [facility] ENDOSCOPY;  Service: Gastroenterology   • FRACTURE SURGERY      ankle   • HYSTERECTOMY     • LAPAROSCOPIC CHOLECYSTECTOMY         History reviewed. No pertinent family history.    Social History     Socioeconomic History   • Marital status:      Spouse name: Not on file   • Number of children: Not on file   • Years of education: Not on file   • Highest education level: Not on file   Tobacco Use   • Smoking status: Never Smoker   • Smokeless tobacco: Never Used   Substance and Sexual Activity   • Alcohol use: No   • Drug use: No   • Sexual activity: Defer           Objective   Physical Exam   Constitutional: She is oriented to person, place, and time. She appears well-developed and well-nourished. No distress.   HENT:   Head: Normocephalic and atraumatic.   Right Ear: External ear normal.   Left Ear: External ear normal.   Mouth/Throat: Oropharynx is clear and " moist. No oropharyngeal exudate.   Eyes: Conjunctivae and EOM are normal. Pupils are equal, round, and reactive to light. Right eye exhibits no discharge. Left eye exhibits no discharge. No scleral icterus.   Neck: Neck supple. No JVD present. No tracheal deviation present. No thyromegaly present.   Cardiovascular: Normal rate, regular rhythm, normal heart sounds and intact distal pulses. Exam reveals no friction rub.   No murmur heard.  Pulmonary/Chest: Effort normal and breath sounds normal. No stridor. No respiratory distress. She has no wheezes. She has no rales. She exhibits no tenderness.   Abdominal: Soft. Bowel sounds are normal. She exhibits no distension and no mass. There is no tenderness. There is no rebound and no guarding.   Musculoskeletal: She exhibits tenderness. She exhibits no edema or deformity.   Low back is normal in appearance.  Her pain is reproducible with palpation of bilateral lumbar paraspinal muscles.  She has a normal straight leg raise bilaterally.    Sensation is intact to light touch bilaterally in her lower extremities.  Strength is 5/5 bilaterally.  She has a normal great toe extension.   Lymphadenopathy:     She has no cervical adenopathy.   Neurological: She is alert and oriented to person, place, and time. No cranial nerve deficit. She exhibits normal muscle tone. Coordination normal.   Skin: Skin is warm and dry. Capillary refill takes 2 to 3 seconds. No rash noted. She is not diaphoretic. No erythema.   Psychiatric: She has a normal mood and affect. Her behavior is normal. Judgment and thought content normal.   Nursing note and vitals reviewed.      Procedures           ED Course  ED Course as of Jul 08 0442   Mon Jul 08, 2019   2331 Patient was placed in room 17 and evaluated by me.  Physical exam was consistent with musculoskeletal low back pain.  She was given morphine in the emergency department and will be discharged on diclofenac.  [CE]      ED Course User Index  [CE]  Rodrigo Jain,           Labs Reviewed - No data to display  No results found.        MDM      Final diagnoses:   Bilateral low back pain without sciatica, unspecified chronicity            Rodrigo Jain DO  07/08/19 0447

## 2019-07-08 NOTE — ED NOTES
Pt has chronic back pain and denies any recent injury. Pt states that her back pain flares up every once in a while.      Chanel Nicole RN  07/07/19 [id number]

## 2019-07-24 ENCOUNTER — APPOINTMENT (OUTPATIENT)
Dept: ONCOLOGY | Facility: CLINIC | Age: 69
End: 2019-07-24

## 2019-07-24 ENCOUNTER — APPOINTMENT (OUTPATIENT)
Dept: ONCOLOGY | Facility: HOSPITAL | Age: 69
End: 2019-07-24

## 2019-07-25 ENCOUNTER — LAB (OUTPATIENT)
Dept: ONCOLOGY | Facility: HOSPITAL | Age: 69
End: 2019-07-25

## 2019-07-25 ENCOUNTER — OFFICE VISIT (OUTPATIENT)
Dept: ONCOLOGY | Facility: CLINIC | Age: 69
End: 2019-07-25

## 2019-07-25 VITALS
SYSTOLIC BLOOD PRESSURE: 114 MMHG | BODY MASS INDEX: 25.46 KG/M2 | HEART RATE: 75 BPM | WEIGHT: 152.8 LBS | HEIGHT: 65 IN | DIASTOLIC BLOOD PRESSURE: 78 MMHG | RESPIRATION RATE: 18 BRPM | TEMPERATURE: 98.6 F

## 2019-07-25 DIAGNOSIS — D50.8 IRON DEFICIENCY ANEMIA SECONDARY TO INADEQUATE DIETARY IRON INTAKE: ICD-10-CM

## 2019-07-25 DIAGNOSIS — D50.8 IRON DEFICIENCY ANEMIA SECONDARY TO INADEQUATE DIETARY IRON INTAKE: Primary | ICD-10-CM

## 2019-07-25 LAB
ALBUMIN SERPL-MCNC: 3.8 G/DL (ref 3.5–5.2)
ALBUMIN/GLOB SERPL: 1.6 G/DL
ALP SERPL-CCNC: 51 U/L (ref 39–117)
ALT SERPL W P-5'-P-CCNC: 14 U/L (ref 1–33)
ANION GAP SERPL CALCULATED.3IONS-SCNC: 10 MMOL/L (ref 5–15)
AST SERPL-CCNC: 20 U/L (ref 1–32)
BASOPHILS # BLD AUTO: 0.05 10*3/MM3 (ref 0–0.2)
BASOPHILS NFR BLD AUTO: 0.8 % (ref 0–1.5)
BILIRUB SERPL-MCNC: 0.2 MG/DL (ref 0.2–1.2)
BUN BLD-MCNC: 8 MG/DL (ref 8–23)
BUN/CREAT SERPL: 8.4 (ref 7–25)
CALCIUM SPEC-SCNC: 8.9 MG/DL (ref 8.6–10.5)
CHLORIDE SERPL-SCNC: 105 MMOL/L (ref 98–107)
CO2 SERPL-SCNC: 28 MMOL/L (ref 22–29)
CREAT BLD-MCNC: 0.95 MG/DL (ref 0.57–1)
DEPRECATED RDW RBC AUTO: 40.9 FL (ref 37–54)
EOSINOPHIL # BLD AUTO: 0.26 10*3/MM3 (ref 0–0.4)
EOSINOPHIL NFR BLD AUTO: 4.3 % (ref 0.3–6.2)
ERYTHROCYTE [DISTWIDTH] IN BLOOD BY AUTOMATED COUNT: 12 % (ref 12.3–15.4)
FERRITIN SERPL-MCNC: 184.9 NG/ML (ref 13–150)
FOLATE SERPL-MCNC: 13.7 NG/ML (ref 4.78–24.2)
GFR SERPL CREATININE-BSD FRML MDRD: 59 ML/MIN/1.73
GLOBULIN UR ELPH-MCNC: 2.4 GM/DL
GLUCOSE BLD-MCNC: 144 MG/DL (ref 65–99)
HCT VFR BLD AUTO: 37.8 % (ref 34–46.6)
HGB BLD-MCNC: 12.7 G/DL (ref 12–15.9)
IMM GRANULOCYTES # BLD AUTO: 0.01 10*3/MM3 (ref 0–0.05)
IMM GRANULOCYTES NFR BLD AUTO: 0.2 % (ref 0–0.5)
IRON 24H UR-MRATE: 61 MCG/DL (ref 37–145)
IRON SATN MFR SERPL: 20 % (ref 20–50)
LYMPHOCYTES # BLD AUTO: 1 10*3/MM3 (ref 0.7–3.1)
LYMPHOCYTES NFR BLD AUTO: 16.4 % (ref 19.6–45.3)
MCH RBC QN AUTO: 31.3 PG (ref 26.6–33)
MCHC RBC AUTO-ENTMCNC: 33.6 G/DL (ref 31.5–35.7)
MCV RBC AUTO: 93.1 FL (ref 79–97)
MONOCYTES # BLD AUTO: 0.43 10*3/MM3 (ref 0.1–0.9)
MONOCYTES NFR BLD AUTO: 7 % (ref 5–12)
NEUTROPHILS # BLD AUTO: 4.36 10*3/MM3 (ref 1.7–7)
NEUTROPHILS NFR BLD AUTO: 71.3 % (ref 42.7–76)
NRBC BLD AUTO-RTO: 0 /100 WBC (ref 0–0.2)
PLATELET # BLD AUTO: 204 10*3/MM3 (ref 140–450)
PMV BLD AUTO: 10.1 FL (ref 6–12)
POTASSIUM BLD-SCNC: 3.4 MMOL/L (ref 3.5–5.2)
PROT SERPL-MCNC: 6.2 G/DL (ref 6–8.5)
RBC # BLD AUTO: 4.06 10*6/MM3 (ref 3.77–5.28)
SODIUM BLD-SCNC: 143 MMOL/L (ref 136–145)
TIBC SERPL-MCNC: 311 MCG/DL (ref 298–536)
TRANSFERRIN SERPL-MCNC: 209 MG/DL (ref 200–360)
VIT B12 BLD-MCNC: 886 PG/ML (ref 211–946)
WBC NRBC COR # BLD: 6.11 10*3/MM3 (ref 3.4–10.8)

## 2019-07-25 PROCEDURE — 84466 ASSAY OF TRANSFERRIN: CPT | Performed by: NURSE PRACTITIONER

## 2019-07-25 PROCEDURE — 83540 ASSAY OF IRON: CPT | Performed by: NURSE PRACTITIONER

## 2019-07-25 PROCEDURE — G0463 HOSPITAL OUTPT CLINIC VISIT: HCPCS | Performed by: NURSE PRACTITIONER

## 2019-07-25 PROCEDURE — 85025 COMPLETE CBC W/AUTO DIFF WBC: CPT | Performed by: NURSE PRACTITIONER

## 2019-07-25 PROCEDURE — 82607 VITAMIN B-12: CPT | Performed by: NURSE PRACTITIONER

## 2019-07-25 PROCEDURE — 82746 ASSAY OF FOLIC ACID SERUM: CPT | Performed by: NURSE PRACTITIONER

## 2019-07-25 PROCEDURE — 82728 ASSAY OF FERRITIN: CPT | Performed by: NURSE PRACTITIONER

## 2019-07-25 PROCEDURE — 80053 COMPREHEN METABOLIC PANEL: CPT | Performed by: NURSE PRACTITIONER

## 2019-07-25 PROCEDURE — 99212 OFFICE O/P EST SF 10 MIN: CPT | Performed by: NURSE PRACTITIONER

## 2019-07-25 NOTE — PROGRESS NOTES
DATE OF VISIT: 7/25/2019    REASON FOR VISIT:  Follow-up for iron deficiency     HISTORY OF PRESENT ILLNESS: 69-year-old female with a past medical history significant for hypertension, history of chronic back pain secondary to vertebral fracture for many years, was initially seen in consultation here at [facility] by Dr. Martinez in April 2017 for evaluation of iron deficiency anemia.  In view of patient not being able to tolerate iron by mouth she was started on intravenous Feraheme that she received 2 dose in May 2017.  She came in for follow-up and iron levels still below normal so she was given 2 additional doses of IV iron in August. She is here today for f/u. She had EGD with esophageal dilatation in August.  She denies any blood in stool or urine. She is taking B-12 and folic acid daily.            PAST MEDICAL HISTORY:    Past Medical History:   Diagnosis Date   • Anemia    • History of transfusion    • Hypertension        SOCIAL HISTORY:    Social History     Tobacco Use   • Smoking status: Never Smoker   • Smokeless tobacco: Never Used   Substance Use Topics   • Alcohol use: No   • Drug use: No       Surgical History :  Past Surgical History:   Procedure Laterality Date   • CHOLECYSTECTOMY     • COLONOSCOPY N/A 1/10/2018    Procedure: COLONOSCOPY;  Surgeon: Elton Santiago DO;  Location: [facility] ENDOSCOPY;  Service:    • ENDOSCOPY N/A 1/10/2018    Procedure: ESOPHAGOGASTRODUODENOSCOPY;  Surgeon: Elton Santiago DO;  Location: [facility] ENDOSCOPY;  Service:    • ENDOSCOPY N/A 1/31/2018    Procedure: ESOPHAGOGASTRODUODENOSCOPY;  Surgeon: Elton Santiago DO;  Location: [facility] ENDOSCOPY;  Service:    • ENDOSCOPY N/A 4/4/2018    Procedure: ESOPHAGOGASTRODUODENOSCOPY;  Surgeon: Elton Santiago DO;  Location: [facility] ENDOSCOPY;  Service: Gastroenterology   • ENDOSCOPY N/A 8/15/2018    Procedure: ESOPHAGOGASTRODUODENOSCOPY;  Surgeon: Elton Santiago DO;  Location: [facility] ENDOSCOPY;  Service:  "Gastroenterology   • FRACTURE SURGERY      ankle   • HYSTERECTOMY     • LAPAROSCOPIC CHOLECYSTECTOMY         ALLERGIES:    Allergies   Allergen Reactions   • Diphenhydramine Hcl Myalgia     Leg pain   • Metoclopramide Myalgia     Neck pain and drawing   • Promethazine Other (See Comments)     \"legs go wild\"       REVIEW OF SYSTEMS:      CONSTITUTIONAL:  No fever, chills, or night sweats.     HEENT:  No epistaxis, mouth sores, or difficulty swallowing.    RESPIRATORY:  No new shortness of breath or cough at present.    CARDIOVASCULAR:  No chest pain or palpitations.    GASTROINTESTINAL:  No abdominal pain, nausea, vomiting, or blood in the stool.    GENITOURINARY:  No dysuria or hematuria.    MUSCULOSKELETAL:  No any new back pain or arthralgias.     NEUROLOGICAL:  No tingling or numbness. No new headache or dizziness.     LYMPHATICS:  Denies any abnormal swollen and anywhere in the body.    SKIN:  Denies any new skin rash.    PHYSICAL EXAMINATION:      VITAL SIGNS:  /78   Pulse 75   Temp 98.6 °F (37 °C) (Temporal)   Resp 18   Ht 165.1 cm (65\")   Wt 69.3 kg (152 lb 12.8 oz)   BMI 25.43 kg/m²     GENERAL:  Not in any distress.    HEENT:  Normocephalic, Atraumatic.Mild Conjunctival pallor. No icterus. Extraocular Movements Intact. No Facial Asymmetry noted.    NECK:  No adenopathy. No JVD.    RESPIRATORY:  Fair air entry bilateral. No rhonchi or wheezing.    CARDIOVASCULAR:  S1, S2. Regular rate and rhythm. No murmur or gallop appreciated.    ABDOMEN:  Soft, obese, nontender. Bowel sounds present in all four quadrants.  No organomegaly appreciated.    EXTREMITIES:  No edema.No Calf Tenderness.    NEUROLOGIC:  Alert, awake and oriented ×3.  No  Motor or sensory deficit appreciated. Cranial Nerves 2-12 grossly intact.    SKIN : No new skin lesion identified  DIAGNOSTIC DATA:    Glucose   Date Value Ref Range Status   07/25/2019 144 (H) 65 - 99 mg/dL Final     Sodium   Date Value Ref Range Status   07/25/2019 " 143 136 - 145 mmol/L Final     Potassium   Date Value Ref Range Status   07/25/2019 3.4 (L) 3.5 - 5.2 mmol/L Final     CO2   Date Value Ref Range Status   07/25/2019 28.0 22.0 - 29.0 mmol/L Final     Chloride   Date Value Ref Range Status   07/25/2019 105 98 - 107 mmol/L Final     Anion Gap   Date Value Ref Range Status   07/25/2019 10.0 5.0 - 15.0 mmol/L Final     Creatinine   Date Value Ref Range Status   07/25/2019 0.95 0.57 - 1.00 mg/dL Final     BUN   Date Value Ref Range Status   07/25/2019 8 8 - 23 mg/dL Final     BUN/Creatinine Ratio   Date Value Ref Range Status   07/25/2019 8.4 7.0 - 25.0 Final     Calcium   Date Value Ref Range Status   07/25/2019 8.9 8.6 - 10.5 mg/dL Final     eGFR Non  Amer   Date Value Ref Range Status   07/25/2019 59 (L) >60 mL/min/1.73 Final     Alkaline Phosphatase   Date Value Ref Range Status   07/25/2019 51 39 - 117 U/L Final     Total Protein   Date Value Ref Range Status   07/25/2019 6.2 6.0 - 8.5 g/dL Final     ALT (SGPT)   Date Value Ref Range Status   07/25/2019 14 1 - 33 U/L Final     AST (SGOT)   Date Value Ref Range Status   07/25/2019 20 1 - 32 U/L Final     Total Bilirubin   Date Value Ref Range Status   07/25/2019 0.2 0.2 - 1.2 mg/dL Final     Albumin   Date Value Ref Range Status   07/25/2019 3.80 3.50 - 5.20 g/dL Final     Globulin   Date Value Ref Range Status   07/25/2019 2.4 gm/dL Final     Lab Results   Component Value Date    WBC 6.11 07/25/2019    HGB 12.7 07/25/2019    HCT 37.8 07/25/2019    MCV 93.1 07/25/2019     07/25/2019     Lab Results   Component Value Date    NEUTROABS 4.36 07/25/2019    IRON 61 07/25/2019    TIBC 311 07/25/2019    LABIRON 20 07/25/2019    FERRITIN 184.90 (H) 07/25/2019    [id number] 01/23/2019    FOLATE 7.75 01/23/2019     No results found for: , LABCA2, AFPTM, HCGQUANT, , CHROMGRNA, [id number], CEA, REFLABREPO]        ASSESSMENT AND PLAN:       1. Iron deficiency anemia, questionable etiology, most likely  GI loss, she follows with Dr. Santiago routinely; her Hgb is stable at 12.4; repeat iron studies pending; will continue to monitor and recheck again in 6 mos. She will continue with B-12 and folic acid tablet daily.     2. Hypertension, BP today is 114/78     3. Health maintenance, she does not smoke and sees Dr. Santiago routinely.                      This document has been signed by EVAN Tobias on July 25, 2019 1:39 PM

## 2020-01-23 ENCOUNTER — APPOINTMENT (OUTPATIENT)
Dept: ONCOLOGY | Facility: CLINIC | Age: 70
End: 2020-01-23

## 2020-01-23 ENCOUNTER — APPOINTMENT (OUTPATIENT)
Dept: ONCOLOGY | Facility: HOSPITAL | Age: 70
End: 2020-01-23

## 2020-01-23 ENCOUNTER — TELEPHONE (OUTPATIENT)
Dept: ONCOLOGY | Facility: CLINIC | Age: 70
End: 2020-01-23

## 2020-01-23 NOTE — TELEPHONE ENCOUNTER
PATIENT WOULD LIKE TO CANCEL TODAY'S (1/23/20) APPOINTMENT. STATED SHE WILL CALL BACK TO RESCHEDULE

## 2020-01-27 ENCOUNTER — TELEPHONE (OUTPATIENT)
Dept: ONCOLOGY | Facility: CLINIC | Age: 70
End: 2020-01-27

## 2020-01-31 ENCOUNTER — OFFICE VISIT (OUTPATIENT)
Dept: ONCOLOGY | Facility: CLINIC | Age: 70
End: 2020-01-31

## 2020-01-31 ENCOUNTER — LAB (OUTPATIENT)
Dept: ONCOLOGY | Facility: HOSPITAL | Age: 70
End: 2020-01-31

## 2020-01-31 VITALS
TEMPERATURE: 98.3 F | HEIGHT: 65 IN | RESPIRATION RATE: 18 BRPM | BODY MASS INDEX: 23.93 KG/M2 | DIASTOLIC BLOOD PRESSURE: 86 MMHG | WEIGHT: 143.6 LBS | HEART RATE: 67 BPM | SYSTOLIC BLOOD PRESSURE: 137 MMHG

## 2020-01-31 DIAGNOSIS — D50.8 IRON DEFICIENCY ANEMIA SECONDARY TO INADEQUATE DIETARY IRON INTAKE: ICD-10-CM

## 2020-01-31 DIAGNOSIS — D50.8 IRON DEFICIENCY ANEMIA SECONDARY TO INADEQUATE DIETARY IRON INTAKE: Primary | ICD-10-CM

## 2020-01-31 LAB
ALBUMIN SERPL-MCNC: 4 G/DL (ref 3.5–5.2)
ALBUMIN/GLOB SERPL: 1.9 G/DL
ALP SERPL-CCNC: 57 U/L (ref 39–117)
ALT SERPL W P-5'-P-CCNC: 11 U/L (ref 1–33)
ANION GAP SERPL CALCULATED.3IONS-SCNC: 10 MMOL/L (ref 5–15)
AST SERPL-CCNC: 17 U/L (ref 1–32)
BASOPHILS # BLD AUTO: 0.06 10*3/MM3 (ref 0–0.2)
BASOPHILS NFR BLD AUTO: 1.2 % (ref 0–1.5)
BILIRUB SERPL-MCNC: 0.2 MG/DL (ref 0.2–1.2)
BUN BLD-MCNC: 13 MG/DL (ref 8–23)
BUN/CREAT SERPL: 11.6 (ref 7–25)
CALCIUM SPEC-SCNC: 9.5 MG/DL (ref 8.6–10.5)
CHLORIDE SERPL-SCNC: 101 MMOL/L (ref 98–107)
CO2 SERPL-SCNC: 29 MMOL/L (ref 22–29)
CREAT BLD-MCNC: 1.12 MG/DL (ref 0.57–1)
DEPRECATED RDW RBC AUTO: 42.5 FL (ref 37–54)
EOSINOPHIL # BLD AUTO: 0.28 10*3/MM3 (ref 0–0.4)
EOSINOPHIL NFR BLD AUTO: 5.5 % (ref 0.3–6.2)
ERYTHROCYTE [DISTWIDTH] IN BLOOD BY AUTOMATED COUNT: 12.2 % (ref 12.3–15.4)
FERRITIN SERPL-MCNC: 134.6 NG/ML (ref 13–150)
GFR SERPL CREATININE-BSD FRML MDRD: 48 ML/MIN/1.73
GLOBULIN UR ELPH-MCNC: 2.1 GM/DL
GLUCOSE BLD-MCNC: 118 MG/DL (ref 65–99)
HCT VFR BLD AUTO: 36.6 % (ref 34–46.6)
HGB BLD-MCNC: 12.2 G/DL (ref 12–15.9)
IMM GRANULOCYTES # BLD AUTO: 0.01 10*3/MM3 (ref 0–0.05)
IMM GRANULOCYTES NFR BLD AUTO: 0.2 % (ref 0–0.5)
IRON 24H UR-MRATE: 54 MCG/DL (ref 37–145)
IRON SATN MFR SERPL: 17 % (ref 20–50)
LYMPHOCYTES # BLD AUTO: 1.42 10*3/MM3 (ref 0.7–3.1)
LYMPHOCYTES NFR BLD AUTO: 27.8 % (ref 19.6–45.3)
MCH RBC QN AUTO: 31.6 PG (ref 26.6–33)
MCHC RBC AUTO-ENTMCNC: 33.3 G/DL (ref 31.5–35.7)
MCV RBC AUTO: 94.8 FL (ref 79–97)
MONOCYTES # BLD AUTO: 0.42 10*3/MM3 (ref 0.1–0.9)
MONOCYTES NFR BLD AUTO: 8.2 % (ref 5–12)
NEUTROPHILS # BLD AUTO: 2.92 10*3/MM3 (ref 1.7–7)
NEUTROPHILS NFR BLD AUTO: 57.1 % (ref 42.7–76)
NRBC BLD AUTO-RTO: 0 /100 WBC (ref 0–0.2)
PLATELET # BLD AUTO: 203 10*3/MM3 (ref 140–450)
PMV BLD AUTO: 10.6 FL (ref 6–12)
POTASSIUM BLD-SCNC: 3.4 MMOL/L (ref 3.5–5.2)
PROT SERPL-MCNC: 6.1 G/DL (ref 6–8.5)
RBC # BLD AUTO: 3.86 10*6/MM3 (ref 3.77–5.28)
SODIUM BLD-SCNC: 140 MMOL/L (ref 136–145)
TIBC SERPL-MCNC: 319 MCG/DL (ref 298–536)
TRANSFERRIN SERPL-MCNC: 214 MG/DL (ref 200–360)
WBC NRBC COR # BLD: 5.11 10*3/MM3 (ref 3.4–10.8)

## 2020-01-31 PROCEDURE — 85025 COMPLETE CBC W/AUTO DIFF WBC: CPT | Performed by: NURSE PRACTITIONER

## 2020-01-31 PROCEDURE — G0463 HOSPITAL OUTPT CLINIC VISIT: HCPCS | Performed by: NURSE PRACTITIONER

## 2020-01-31 PROCEDURE — 80053 COMPREHEN METABOLIC PANEL: CPT | Performed by: NURSE PRACTITIONER

## 2020-01-31 PROCEDURE — 83540 ASSAY OF IRON: CPT | Performed by: NURSE PRACTITIONER

## 2020-01-31 PROCEDURE — 82728 ASSAY OF FERRITIN: CPT | Performed by: NURSE PRACTITIONER

## 2020-01-31 PROCEDURE — 99213 OFFICE O/P EST LOW 20 MIN: CPT | Performed by: NURSE PRACTITIONER

## 2020-01-31 PROCEDURE — 84466 ASSAY OF TRANSFERRIN: CPT | Performed by: NURSE PRACTITIONER

## 2020-01-31 RX ORDER — LANOLIN ALCOHOL/MO/W.PET/CERES
1000 CREAM (GRAM) TOPICAL DAILY
Qty: 90 TABLET | Refills: 0 | Status: SHIPPED | OUTPATIENT
Start: 2020-01-31

## 2020-01-31 RX ORDER — FOLIC ACID 1 MG/1
1 TABLET ORAL DAILY
Qty: 90 TABLET | Refills: 1 | Status: SHIPPED | OUTPATIENT
Start: 2020-01-31

## 2020-01-31 NOTE — PROGRESS NOTES
DATE OF VISIT: 1/31/2020    REASON FOR VISIT:  Follow-up for iron deficiency     HISTORY OF PRESENT ILLNESS: 70-year-old female with a past medical history significant for hypertension, history of chronic back pain secondary to vertebral fracture for many years, was initially seen in consultation here at [facility] by Dr. Martinez in April 2017 for evaluation of iron deficiency anemia.  In view of patient not being able to tolerate iron by mouth she was started on intravenous Feraheme that she received 2 dose in May 2017.    Recently in January 2019 she received 2 additional doses of IV iron. She is currently taking B-12 and folic acid daily.She denies any blood in stool or urine.       PAST MEDICAL HISTORY:    Past Medical History:   Diagnosis Date   • Anemia    • History of transfusion    • Hypertension        SOCIAL HISTORY:    Social History     Tobacco Use   • Smoking status: Never Smoker   • Smokeless tobacco: Never Used   Substance Use Topics   • Alcohol use: No   • Drug use: No       Surgical History :  Past Surgical History:   Procedure Laterality Date   • CHOLECYSTECTOMY     • COLONOSCOPY N/A 1/10/2018    Procedure: COLONOSCOPY;  Surgeon: Elton Santiago DO;  Location: [facility] ENDOSCOPY;  Service:    • ENDOSCOPY N/A 1/10/2018    Procedure: ESOPHAGOGASTRODUODENOSCOPY;  Surgeon: Elton Santiago DO;  Location: [facility] ENDOSCOPY;  Service:    • ENDOSCOPY N/A 1/31/2018    Procedure: ESOPHAGOGASTRODUODENOSCOPY;  Surgeon: Elton Santiago DO;  Location: [facility] ENDOSCOPY;  Service:    • ENDOSCOPY N/A 4/4/2018    Procedure: ESOPHAGOGASTRODUODENOSCOPY;  Surgeon: Elton Santiago DO;  Location: [facility] ENDOSCOPY;  Service: Gastroenterology   • ENDOSCOPY N/A 8/15/2018    Procedure: ESOPHAGOGASTRODUODENOSCOPY;  Surgeon: Elton Santiago DO;  Location: [facility] ENDOSCOPY;  Service: Gastroenterology   • FRACTURE SURGERY      ankle   • HYSTERECTOMY     • LAPAROSCOPIC CHOLECYSTECTOMY         ALLERGIES:   "  Allergies   Allergen Reactions   • Diphenhydramine Hcl Myalgia     Leg pain   • Metoclopramide Myalgia     Neck pain and drawing   • Promethazine Other (See Comments)     \"legs go wild\"       REVIEW OF SYSTEMS:      CONSTITUTIONAL:  No fever, chills, or night sweats.     HEENT:  No epistaxis, mouth sores, or difficulty swallowing.    RESPIRATORY:  No new shortness of breath or cough at present.    CARDIOVASCULAR:  No chest pain or palpitations.    GASTROINTESTINAL:  No abdominal pain, nausea, vomiting, or blood in the stool.    GENITOURINARY:  No dysuria or hematuria.    MUSCULOSKELETAL:  No any new back pain or arthralgias.     NEUROLOGICAL:  No tingling or numbness. No new headache or dizziness.     LYMPHATICS:  Denies any abnormal swollen and anywhere in the body.    SKIN:  Denies any new skin rash.    PHYSICAL EXAMINATION:      VITAL SIGNS:  /86   Pulse 67   Temp 98.3 °F (36.8 °C) (Temporal)   Resp 18   Ht 165.1 cm (65\")   Wt 65.1 kg (143 lb 9.6 oz)   BMI 23.90 kg/m²     GENERAL:  Not in any distress.    HEENT:  Normocephalic, Atraumatic.Mild Conjunctival pallor. No icterus. No Facial Asymmetry noted.    NECK:  No adenopathy. No JVD.    RESPIRATORY:  Fair air entry bilateral. No rhonchi or wheezing.    CARDIOVASCULAR:  S1, S2. Regular rate and rhythm. No murmur or gallop appreciated.    ABDOMEN:  Soft, obese, nontender. Bowel sounds present in all four quadrants.  No organomegaly appreciated.    EXTREMITIES:  No edema.No Calf Tenderness.    NEUROLOGIC:  Alert, awake and oriented ×3.    SKIN : No new skin lesion identified  DIAGNOSTIC DATA:    Glucose   Date Value Ref Range Status   01/31/2020 118 (H) 65 - 99 mg/dL Final     Sodium   Date Value Ref Range Status   01/31/2020 140 136 - 145 mmol/L Final     Potassium   Date Value Ref Range Status   01/31/2020 3.4 (L) 3.5 - 5.2 mmol/L Final     CO2   Date Value Ref Range Status   01/31/2020 29.0 22.0 - 29.0 mmol/L Final     Chloride   Date Value Ref " Range Status   01/31/2020 101 98 - 107 mmol/L Final     Anion Gap   Date Value Ref Range Status   01/31/2020 10.0 5.0 - 15.0 mmol/L Final     Creatinine   Date Value Ref Range Status   01/31/2020 1.12 (H) 0.57 - 1.00 mg/dL Final     BUN   Date Value Ref Range Status   01/31/2020 13 8 - 23 mg/dL Final     BUN/Creatinine Ratio   Date Value Ref Range Status   01/31/2020 11.6 7.0 - 25.0 Final     Calcium   Date Value Ref Range Status   01/31/2020 9.5 8.6 - 10.5 mg/dL Final     eGFR Non  Amer   Date Value Ref Range Status   01/31/2020 48 (L) >60 mL/min/1.73 Final     Alkaline Phosphatase   Date Value Ref Range Status   01/31/2020 57 39 - 117 U/L Final     Total Protein   Date Value Ref Range Status   01/31/2020 6.1 6.0 - 8.5 g/dL Final     ALT (SGPT)   Date Value Ref Range Status   01/31/2020 11 1 - 33 U/L Final     AST (SGOT)   Date Value Ref Range Status   01/31/2020 17 1 - 32 U/L Final     Total Bilirubin   Date Value Ref Range Status   01/31/2020 0.2 0.2 - 1.2 mg/dL Final     Albumin   Date Value Ref Range Status   01/31/2020 4.00 3.50 - 5.20 g/dL Final     Globulin   Date Value Ref Range Status   01/31/2020 2.1 gm/dL Final     Lab Results   Component Value Date    WBC 5.11 01/31/2020    HGB 12.2 01/31/2020    HCT 36.6 01/31/2020    MCV 94.8 01/31/2020     01/31/2020     Lab Results   Component Value Date    NEUTROABS 2.92 01/31/2020    IRON 54 01/31/2020    TIBC 319 01/31/2020    LABIRON 17 (L) 01/31/2020    FERRITIN 134.60 01/31/2020    [id number] 886 07/25/2019    FOLATE 13.70 07/25/2019     No results found for: , LABCA2, AFPTM, HCGQUANT, , CHROMGRNA, [id number], CEA, REFLABREPO]        ASSESSMENT AND PLAN:    1. Iron deficiency anemia, questionable etiology, most likely GI loss, she follows with Dr. Santiago routinely; her Hgb is stable at 12.; repeat iron studies pending; will continue to monitor and recheck again in 6 mos. She will continue with B-12 and folic acid tablet daily; new  prescriptions sent to pharmacy today.     2. Hypertension, BP today is 137/86     3. Health maintenance, she does not smoke and sees Dr. Santiago routinely.        4. Chronic back pain; follows with pain clinic and currently on oxycodone.    Patient's Body mass index is 23.9 kg/m². BMI is within normal parameters. No follow-up required..      This document has been signed by EVAN Tobias on January 31, 2020 2:09 PM

## 2020-07-29 ENCOUNTER — APPOINTMENT (OUTPATIENT)
Dept: ONCOLOGY | Facility: CLINIC | Age: 70
End: 2020-07-29

## 2020-07-29 ENCOUNTER — APPOINTMENT (OUTPATIENT)
Dept: ONCOLOGY | Facility: HOSPITAL | Age: 70
End: 2020-07-29

## 2020-08-05 ENCOUNTER — LAB (OUTPATIENT)
Dept: ONCOLOGY | Facility: HOSPITAL | Age: 70
End: 2020-08-05

## 2020-08-05 ENCOUNTER — OFFICE VISIT (OUTPATIENT)
Dept: ONCOLOGY | Facility: CLINIC | Age: 70
End: 2020-08-05

## 2020-08-05 VITALS
HEART RATE: 73 BPM | BODY MASS INDEX: 24.34 KG/M2 | SYSTOLIC BLOOD PRESSURE: 130 MMHG | WEIGHT: 146.1 LBS | DIASTOLIC BLOOD PRESSURE: 64 MMHG | TEMPERATURE: 98 F | RESPIRATION RATE: 18 BRPM | HEIGHT: 65 IN

## 2020-08-05 DIAGNOSIS — D50.8 IRON DEFICIENCY ANEMIA SECONDARY TO INADEQUATE DIETARY IRON INTAKE: ICD-10-CM

## 2020-08-05 LAB
BASOPHILS # BLD AUTO: 0.04 10*3/MM3 (ref 0–0.2)
BASOPHILS NFR BLD AUTO: 0.6 % (ref 0–1.5)
DEPRECATED RDW RBC AUTO: 42.2 FL (ref 37–54)
EOSINOPHIL # BLD AUTO: 0.19 10*3/MM3 (ref 0–0.4)
EOSINOPHIL NFR BLD AUTO: 2.9 % (ref 0.3–6.2)
ERYTHROCYTE [DISTWIDTH] IN BLOOD BY AUTOMATED COUNT: 12.1 % (ref 12.3–15.4)
FERRITIN SERPL-MCNC: 85.88 NG/ML (ref 13–150)
HCT VFR BLD AUTO: 37.1 % (ref 34–46.6)
HGB BLD-MCNC: 12.3 G/DL (ref 12–15.9)
IMM GRANULOCYTES # BLD AUTO: 0.02 10*3/MM3 (ref 0–0.05)
IMM GRANULOCYTES NFR BLD AUTO: 0.3 % (ref 0–0.5)
IRON 24H UR-MRATE: 59 MCG/DL (ref 37–145)
IRON SATN MFR SERPL: 16 % (ref 20–50)
LYMPHOCYTES # BLD AUTO: 1.23 10*3/MM3 (ref 0.7–3.1)
LYMPHOCYTES NFR BLD AUTO: 18.6 % (ref 19.6–45.3)
MCH RBC QN AUTO: 31.3 PG (ref 26.6–33)
MCHC RBC AUTO-ENTMCNC: 33.2 G/DL (ref 31.5–35.7)
MCV RBC AUTO: 94.4 FL (ref 79–97)
MONOCYTES # BLD AUTO: 0.43 10*3/MM3 (ref 0.1–0.9)
MONOCYTES NFR BLD AUTO: 6.5 % (ref 5–12)
NEUTROPHILS NFR BLD AUTO: 4.71 10*3/MM3 (ref 1.7–7)
NEUTROPHILS NFR BLD AUTO: 71.1 % (ref 42.7–76)
NRBC BLD AUTO-RTO: 0 /100 WBC (ref 0–0.2)
PLATELET # BLD AUTO: 218 10*3/MM3 (ref 140–450)
PMV BLD AUTO: 10.4 FL (ref 6–12)
RBC # BLD AUTO: 3.93 10*6/MM3 (ref 3.77–5.28)
TIBC SERPL-MCNC: 371 MCG/DL (ref 298–536)
TRANSFERRIN SERPL-MCNC: 249 MG/DL (ref 200–360)
WBC # BLD AUTO: 6.62 10*3/MM3 (ref 3.4–10.8)

## 2020-08-05 PROCEDURE — 85025 COMPLETE CBC W/AUTO DIFF WBC: CPT | Performed by: NURSE PRACTITIONER

## 2020-08-05 PROCEDURE — 82728 ASSAY OF FERRITIN: CPT | Performed by: NURSE PRACTITIONER

## 2020-08-05 PROCEDURE — G0463 HOSPITAL OUTPT CLINIC VISIT: HCPCS | Performed by: NURSE PRACTITIONER

## 2020-08-05 PROCEDURE — 99213 OFFICE O/P EST LOW 20 MIN: CPT | Performed by: NURSE PRACTITIONER

## 2020-08-05 PROCEDURE — 83540 ASSAY OF IRON: CPT | Performed by: NURSE PRACTITIONER

## 2020-08-05 PROCEDURE — 84466 ASSAY OF TRANSFERRIN: CPT | Performed by: NURSE PRACTITIONER

## 2020-08-05 NOTE — PROGRESS NOTES
DATE OF VISIT: 8/5/2020    REASON FOR VISIT: follow-up for iron deficiency anemia    HISTORY OF PRESENT ILLNESS:  70-year-old female with a past medical history significant for hypertension, history of chronic back pain secondary to vertebral fracture for many years, was initially seen in consultation here at [facility] by Dr. Martinez in April 2017 for evaluation of iron deficiency anemia.  In view of patient not being able to tolerate iron by mouth she was started on intravenous Feraheme that she received 2 dose in May 2017.    Recently in January 2019 she received 2 additional doses of IV iron. She is currently taking B-12 and folic acid daily.She denies any blood in stool or urine.     PAST MEDICAL HISTORY:    Past Medical History:   Diagnosis Date   • Anemia    • History of transfusion    • Hypertension        SOCIAL HISTORY:    Social History     Tobacco Use   • Smoking status: Never Smoker   • Smokeless tobacco: Never Used   Substance Use Topics   • Alcohol use: No   • Drug use: No       Surgical History :  Past Surgical History:   Procedure Laterality Date   • CHOLECYSTECTOMY     • COLONOSCOPY N/A 1/10/2018    Procedure: COLONOSCOPY;  Surgeon: Elton Santiago DO;  Location: [facility] ENDOSCOPY;  Service:    • ENDOSCOPY N/A 1/10/2018    Procedure: ESOPHAGOGASTRODUODENOSCOPY;  Surgeon: Elton Santiago DO;  Location: [facility] ENDOSCOPY;  Service:    • ENDOSCOPY N/A 1/31/2018    Procedure: ESOPHAGOGASTRODUODENOSCOPY;  Surgeon: Elton Santiago DO;  Location: [facility] ENDOSCOPY;  Service:    • ENDOSCOPY N/A 4/4/2018    Procedure: ESOPHAGOGASTRODUODENOSCOPY;  Surgeon: Elton Santiago DO;  Location: [facility] ENDOSCOPY;  Service: Gastroenterology   • ENDOSCOPY N/A 8/15/2018    Procedure: ESOPHAGOGASTRODUODENOSCOPY;  Surgeon: Elton Santiago DO;  Location: [facility] ENDOSCOPY;  Service: Gastroenterology   • FRACTURE SURGERY      ankle   • HYSTERECTOMY     • LAPAROSCOPIC CHOLECYSTECTOMY         ALLERGIES:   "  Allergies   Allergen Reactions   • Diphenhydramine Hcl Myalgia     Leg pain   • Metoclopramide Myalgia     Neck pain and drawing   • Promethazine Other (See Comments)     \"legs go wild\"       REVIEW OF SYSTEMS:      CONSTITUTIONAL:  No fever, chills, or night sweats.     HEENT:  No epistaxis, mouth sores, or difficulty swallowing.    RESPIRATORY:  No new shortness of breath or cough at present.    CARDIOVASCULAR:  No chest pain or palpitations.    GASTROINTESTINAL:  No abdominal pain, nausea, vomiting, or blood in the stool.    GENITOURINARY:  No dysuria or hematuria.    MUSCULOSKELETAL:  Chronic back pain     NEUROLOGICAL:  No tingling or numbness. No new headache or dizziness.     LYMPHATICS:  Denies any abnormal swollen and anywhere in the body.    SKIN:  Denies any new skin rash.    PHYSICAL EXAMINATION:      VITAL SIGNS:  /64   Pulse 73   Temp 98 °F (36.7 °C) (Temporal)   Resp 18   Ht 165.1 cm (65\")   Wt 66.3 kg (146 lb 1.6 oz)   BMI 24.31 kg/m²     GENERAL:  Not in any distress.    HEENT:  Normocephalic, Atraumatic.Mild Conjunctival pallor. No icterus.  No Facial Asymmetry noted.    NECK:  No adenopathy. No JVD.    RESPIRATORY:  Fair air entry bilateral. No rhonchi or wheezing.    CARDIOVASCULAR:  S1, S2. Regular rate and rhythm. No murmur or gallop appreciated.    ABDOMEN:  Soft, obese, nontender. Bowel sounds present in all four quadrants.  No organomegaly appreciated.    EXTREMITIES:  No edema.No Calf Tenderness.    NEUROLOGIC:  Alert, awake and oriented ×3.      SKIN : No new skin lesion identified  DIAGNOSTIC DATA:    Glucose   Date Value Ref Range Status   01/31/2020 118 (H) 65 - 99 mg/dL Final     Sodium   Date Value Ref Range Status   01/31/2020 140 136 - 145 mmol/L Final     Potassium   Date Value Ref Range Status   01/31/2020 3.4 (L) 3.5 - 5.2 mmol/L Final     CO2   Date Value Ref Range Status   01/31/2020 29.0 22.0 - 29.0 mmol/L Final     Chloride   Date Value Ref Range Status "   01/31/2020 101 98 - 107 mmol/L Final     Anion Gap   Date Value Ref Range Status   01/31/2020 10.0 5.0 - 15.0 mmol/L Final     Creatinine   Date Value Ref Range Status   01/31/2020 1.12 (H) 0.57 - 1.00 mg/dL Final     BUN   Date Value Ref Range Status   01/31/2020 13 8 - 23 mg/dL Final     BUN/Creatinine Ratio   Date Value Ref Range Status   01/31/2020 11.6 7.0 - 25.0 Final     Calcium   Date Value Ref Range Status   01/31/2020 9.5 8.6 - 10.5 mg/dL Final     eGFR Non  Amer   Date Value Ref Range Status   01/31/2020 48 (L) >60 mL/min/1.73 Final     Alkaline Phosphatase   Date Value Ref Range Status   01/31/2020 57 39 - 117 U/L Final     Total Protein   Date Value Ref Range Status   01/31/2020 6.1 6.0 - 8.5 g/dL Final     ALT (SGPT)   Date Value Ref Range Status   01/31/2020 11 1 - 33 U/L Final     AST (SGOT)   Date Value Ref Range Status   01/31/2020 17 1 - 32 U/L Final     Total Bilirubin   Date Value Ref Range Status   01/31/2020 0.2 0.2 - 1.2 mg/dL Final     Albumin   Date Value Ref Range Status   01/31/2020 4.00 3.50 - 5.20 g/dL Final     Globulin   Date Value Ref Range Status   01/31/2020 2.1 gm/dL Final     Lab Results   Component Value Date    WBC 6.62 08/05/2020    HGB 12.3 08/05/2020    HCT 37.1 08/05/2020    MCV 94.4 08/05/2020     08/05/2020     Lab Results   Component Value Date    NEUTROABS 4.71 08/05/2020    IRON 59 08/05/2020    TIBC 371 08/05/2020    LABIRON 16 (L) 08/05/2020    FERRITIN 85.88 08/05/2020    [id number] 886 07/25/2019    FOLATE 13.70 07/25/2019     No results found for: , LABCA2, AFPTM, HCGQUANT, , CHROMGRNA, [id number], CEA, REFLABREPO]    ASSESSMENT AND PLAN:    1. Iron deficiency anemia, questionable etiology, most likely GI loss, she follows with Dr. Santiago routinely; her Hgb is stable at 12.3 with stable iron studies; will continue to monitor and recheck again in 9 mos. She will continue with B-12 and folic acid tablet daily.     2. Hypertension, BP today  is 130/64     3. Health maintenance, she does not smoke and sees Dr. Santiago routinely.         Krystina Duong reports a pain score of 5.  Given her pain assessment as noted, treatment options were discussed and the following options were decided upon as a follow-up plan to address the patient's pain: continuation of current treatment plan for pain.    Patient screened positive for depression based on a PHQ-9 score of 0 on 8/5/2020. Follow-up recommendations include: no follow-up needed.        This document has been signed by EVAN Tobias on August 5, 2020 15:29

## 2021-03-01 ENCOUNTER — IMMUNIZATION (OUTPATIENT)
Dept: VACCINE CLINIC | Facility: HOSPITAL | Age: 71
End: 2021-03-01

## 2021-03-01 PROCEDURE — 91300 HC SARSCOV02 VAC 30MCG/0.3ML IM: CPT | Performed by: THORACIC SURGERY (CARDIOTHORACIC VASCULAR SURGERY)

## 2021-03-01 PROCEDURE — 0001A: CPT | Performed by: THORACIC SURGERY (CARDIOTHORACIC VASCULAR SURGERY)

## 2021-03-22 ENCOUNTER — IMMUNIZATION (OUTPATIENT)
Dept: VACCINE CLINIC | Facility: HOSPITAL | Age: 71
End: 2021-03-22

## 2021-03-22 PROCEDURE — 91300 HC SARSCOV02 VAC 30MCG/0.3ML IM: CPT | Performed by: THORACIC SURGERY (CARDIOTHORACIC VASCULAR SURGERY)

## 2021-03-22 PROCEDURE — 0002A: CPT | Performed by: THORACIC SURGERY (CARDIOTHORACIC VASCULAR SURGERY)

## 2021-04-28 ENCOUNTER — APPOINTMENT (OUTPATIENT)
Dept: ONCOLOGY | Facility: HOSPITAL | Age: 71
End: 2021-04-28

## 2021-04-30 ENCOUNTER — LAB (OUTPATIENT)
Dept: ONCOLOGY | Facility: HOSPITAL | Age: 71
End: 2021-04-30

## 2021-04-30 DIAGNOSIS — D50.8 IRON DEFICIENCY ANEMIA SECONDARY TO INADEQUATE DIETARY IRON INTAKE: ICD-10-CM

## 2021-04-30 LAB
ALBUMIN SERPL-MCNC: 3.7 G/DL (ref 3.5–5.2)
ALBUMIN/GLOB SERPL: 1.2 G/DL
ALP SERPL-CCNC: 76 U/L (ref 39–117)
ALT SERPL W P-5'-P-CCNC: 13 U/L (ref 1–33)
ANION GAP SERPL CALCULATED.3IONS-SCNC: 6 MMOL/L (ref 5–15)
AST SERPL-CCNC: 18 U/L (ref 1–32)
BASOPHILS # BLD AUTO: 0.06 10*3/MM3 (ref 0–0.2)
BASOPHILS NFR BLD AUTO: 1 % (ref 0–1.5)
BILIRUB SERPL-MCNC: 0.3 MG/DL (ref 0–1.2)
BUN SERPL-MCNC: 11 MG/DL (ref 8–23)
BUN/CREAT SERPL: 9.2 (ref 7–25)
CALCIUM SPEC-SCNC: 9.3 MG/DL (ref 8.6–10.5)
CHLORIDE SERPL-SCNC: 103 MMOL/L (ref 98–107)
CO2 SERPL-SCNC: 31 MMOL/L (ref 22–29)
CREAT SERPL-MCNC: 1.19 MG/DL (ref 0.57–1)
DEPRECATED RDW RBC AUTO: 43 FL (ref 37–54)
EOSINOPHIL # BLD AUTO: 0.34 10*3/MM3 (ref 0–0.4)
EOSINOPHIL NFR BLD AUTO: 5.4 % (ref 0.3–6.2)
ERYTHROCYTE [DISTWIDTH] IN BLOOD BY AUTOMATED COUNT: 12.4 % (ref 12.3–15.4)
GFR SERPL CREATININE-BSD FRML MDRD: 45 ML/MIN/1.73
GLOBULIN UR ELPH-MCNC: 3 GM/DL
GLUCOSE SERPL-MCNC: 85 MG/DL (ref 65–99)
HCT VFR BLD AUTO: 36.8 % (ref 34–46.6)
HGB BLD-MCNC: 12.1 G/DL (ref 12–15.9)
IMM GRANULOCYTES # BLD AUTO: 0.01 10*3/MM3 (ref 0–0.05)
IMM GRANULOCYTES NFR BLD AUTO: 0.2 % (ref 0–0.5)
LYMPHOCYTES # BLD AUTO: 1.32 10*3/MM3 (ref 0.7–3.1)
LYMPHOCYTES NFR BLD AUTO: 21 % (ref 19.6–45.3)
MCH RBC QN AUTO: 31.3 PG (ref 26.6–33)
MCHC RBC AUTO-ENTMCNC: 32.9 G/DL (ref 31.5–35.7)
MCV RBC AUTO: 95.3 FL (ref 79–97)
MONOCYTES # BLD AUTO: 0.54 10*3/MM3 (ref 0.1–0.9)
MONOCYTES NFR BLD AUTO: 8.6 % (ref 5–12)
NEUTROPHILS NFR BLD AUTO: 4.03 10*3/MM3 (ref 1.7–7)
NEUTROPHILS NFR BLD AUTO: 63.8 % (ref 42.7–76)
NRBC BLD AUTO-RTO: 0 /100 WBC (ref 0–0.2)
PLATELET # BLD AUTO: 275 10*3/MM3 (ref 140–450)
PMV BLD AUTO: 10.1 FL (ref 6–12)
POTASSIUM SERPL-SCNC: 3.8 MMOL/L (ref 3.5–5.2)
PROT SERPL-MCNC: 6.7 G/DL (ref 6–8.5)
RBC # BLD AUTO: 3.86 10*6/MM3 (ref 3.77–5.28)
SODIUM SERPL-SCNC: 140 MMOL/L (ref 136–145)
WBC # BLD AUTO: 6.3 10*3/MM3 (ref 3.4–10.8)

## 2021-04-30 PROCEDURE — 83540 ASSAY OF IRON: CPT

## 2021-04-30 PROCEDURE — 36415 COLL VENOUS BLD VENIPUNCTURE: CPT | Performed by: NURSE PRACTITIONER

## 2021-04-30 PROCEDURE — 84466 ASSAY OF TRANSFERRIN: CPT

## 2021-04-30 PROCEDURE — 80053 COMPREHEN METABOLIC PANEL: CPT

## 2021-04-30 PROCEDURE — 85025 COMPLETE CBC W/AUTO DIFF WBC: CPT

## 2021-04-30 PROCEDURE — 82728 ASSAY OF FERRITIN: CPT

## 2021-05-03 DIAGNOSIS — D50.8 IRON DEFICIENCY ANEMIA SECONDARY TO INADEQUATE DIETARY IRON INTAKE: Primary | ICD-10-CM

## 2021-05-03 LAB
FERRITIN SERPL-MCNC: 40.52 NG/ML (ref 13–150)
IRON 24H UR-MRATE: 56 MCG/DL (ref 37–145)
IRON SATN MFR SERPL: 13 % (ref 20–50)
TIBC SERPL-MCNC: 431 MCG/DL (ref 298–536)
TRANSFERRIN SERPL-MCNC: 289 MG/DL (ref 200–360)

## 2021-05-05 ENCOUNTER — OFFICE VISIT (OUTPATIENT)
Dept: ONCOLOGY | Facility: CLINIC | Age: 71
End: 2021-05-05

## 2021-05-05 VITALS
SYSTOLIC BLOOD PRESSURE: 115 MMHG | RESPIRATION RATE: 18 BRPM | DIASTOLIC BLOOD PRESSURE: 71 MMHG | HEART RATE: 80 BPM | WEIGHT: 146.2 LBS | BODY MASS INDEX: 24.36 KG/M2 | HEIGHT: 65 IN | TEMPERATURE: 98.3 F

## 2021-05-05 DIAGNOSIS — D50.8 IRON DEFICIENCY ANEMIA SECONDARY TO INADEQUATE DIETARY IRON INTAKE: Primary | ICD-10-CM

## 2021-05-05 PROCEDURE — 99212 OFFICE O/P EST SF 10 MIN: CPT | Performed by: NURSE PRACTITIONER

## 2021-05-05 PROCEDURE — G0463 HOSPITAL OUTPT CLINIC VISIT: HCPCS | Performed by: NURSE PRACTITIONER

## 2021-05-05 RX ORDER — SODIUM CHLORIDE 9 MG/ML
250 INJECTION, SOLUTION INTRAVENOUS ONCE
Status: CANCELLED | OUTPATIENT
Start: 2021-05-05

## 2021-05-05 RX ORDER — DIPHENHYDRAMINE HCL 25 MG
25 CAPSULE ORAL ONCE
Status: CANCELLED | OUTPATIENT
Start: 2021-05-05 | End: 2021-05-05

## 2021-05-07 NOTE — PROGRESS NOTES
"DATE OF VISIT: 5/5/2021      REASON FOR VISIT:  Follow-up for iron deficiency anemia      HISTORY OF PRESENT ILLNESS:    71-year-old female with a past medical history significant for hypertension, history of chronic back pain secondary to vertebral fracture for many years, was initially seen in consultation here at [facility] by Dr. Martinez in April 2017 for evaluation of iron deficiency anemia.  In view of patient not being able to tolerate iron by mouth she was started on intravenous Feraheme that she received 2 dose in May 2017.    Recently in January 2019 she received 2 additional doses of IV iron. She is currently taking B-12 and folic acid daily.She denies any blood in stool or urine.       Past Medical History, Past Surgical History, Social History, Family History have been reviewed and are without significant changes except as mentioned.    Review of Systems   A comprehensive 14 point review of systems was performed and was negative except as mentioned.  +fatigue and weakness  Denies any bleeding  Medications:  The current medication list was reviewed in the EMR    ALLERGIES:    Allergies   Allergen Reactions   • Diphenhydramine Hcl Myalgia     Leg pain   • Metoclopramide Myalgia     Neck pain and drawing   • Promethazine Other (See Comments)     \"legs go wild\"       Objective      Vitals:    05/05/21 1403   BP: 115/71   Pulse: 80   Resp: 18   Temp: 98.3 °F (36.8 °C)   TempSrc: Temporal   Weight: 66.3 kg (146 lb 3.2 oz)   Height: 165.1 cm (65\")   PainSc:   5   PainLoc: Back     Current Status 5/5/2021   ECOG score 0       Physical Exam  GENERAL:  Not in any distress.     HEENT:  Normocephalic, Atraumatic.Mild Conjunctival pallor. No icterus.  No Facial Asymmetry noted.     NECK:  No adenopathy. No JVD.     RESPIRATORY:  Fair air entry bilateral. No rhonchi or wheezing.     CARDIOVASCULAR:  S1, S2. Regular rate and rhythm. No murmur or gallop appreciated.     ABDOMEN:  Soft, obese, nontender. " Bowel sounds present in all four quadrants.  No organomegaly appreciated.     EXTREMITIES:  No edema.No Calf Tenderness.     NEUROLOGIC:  Alert, awake and oriented ×3.       SKIN : No new skin lesion identified    RECENT LABS:  Glucose   Date Value Ref Range Status   04/30/2021 85 65 - 99 mg/dL Final     Sodium   Date Value Ref Range Status   04/30/2021 140 136 - 145 mmol/L Final     Potassium   Date Value Ref Range Status   04/30/2021 3.8 3.5 - 5.2 mmol/L Final     CO2   Date Value Ref Range Status   04/30/2021 31.0 (H) 22.0 - 29.0 mmol/L Final     Chloride   Date Value Ref Range Status   04/30/2021 103 98 - 107 mmol/L Final     Anion Gap   Date Value Ref Range Status   04/30/2021 6.0 5.0 - 15.0 mmol/L Final     Creatinine   Date Value Ref Range Status   04/30/2021 1.19 (H) 0.57 - 1.00 mg/dL Final     BUN   Date Value Ref Range Status   04/30/2021 11 8 - 23 mg/dL Final     BUN/Creatinine Ratio   Date Value Ref Range Status   04/30/2021 9.2 7.0 - 25.0 Final     Calcium   Date Value Ref Range Status   04/30/2021 9.3 8.6 - 10.5 mg/dL Final     eGFR Non  Amer   Date Value Ref Range Status   04/30/2021 45 (L) >60 mL/min/1.73 Final     Alkaline Phosphatase   Date Value Ref Range Status   04/30/2021 76 39 - 117 U/L Final     Total Protein   Date Value Ref Range Status   04/30/2021 6.7 6.0 - 8.5 g/dL Final     ALT (SGPT)   Date Value Ref Range Status   04/30/2021 13 1 - 33 U/L Final     AST (SGOT)   Date Value Ref Range Status   04/30/2021 18 1 - 32 U/L Final     Total Bilirubin   Date Value Ref Range Status   04/30/2021 0.3 0.0 - 1.2 mg/dL Final     Albumin   Date Value Ref Range Status   04/30/2021 3.70 3.50 - 5.20 g/dL Final     Globulin   Date Value Ref Range Status   04/30/2021 3.0 gm/dL Final     Lab Results   Component Value Date    WBC 6.30 04/30/2021    HGB 12.1 04/30/2021    HCT 36.8 04/30/2021    MCV 95.3 04/30/2021     04/30/2021     Lab Results   Component Value Date    NEUTROABS 4.03 04/30/2021     IRON 56 04/30/2021    IRON 59 08/05/2020    IRON 54 01/31/2020    TIBC 431 04/30/2021    TIBC 371 08/05/2020    TIBC 319 01/31/2020    LABIRON 13 (L) 04/30/2021    LABIRON 16 (L) 08/05/2020    LABIRON 17 (L) 01/31/2020    FERRITIN 40.52 04/30/2021    FERRITIN 85.88 08/05/2020    FERRITIN 134.60 01/31/2020    [id number] 886 07/25/2019    [id number] 578 01/23/2019    [id number] 895 10/24/2018    FOLATE 13.70 07/25/2019    FOLATE 7.75 01/23/2019    FOLATE 12.20 10/24/2018     No results found for: , LABCA2, AFPTM, HCGQUANT, , CHROMGRNA, [id number], CEA, REFLABREPO                  Assessment/Plan     Iron deficiency;  questionable etiology, most likely GI loss, she follows with Dr. Santiago routinely; she had received IV iron in past; last infusion was in January 2019; Labs reviewed today; Hgb is 12.1 but  Iron saturation has dropped to 13% along with dropping ferritin from 134 to 40. She is symptomatic with much fatigue; will plan to give her IV iron; she has been educated on the possible adverse events associated with IV iron.  Will plan to recheck her iron studies about 8 week after last infusion. She will continue with B-12 and folic acid tablet daily.                  PHQ-9 Total Score: 0     Krystina Duong reports a pain score of 5.  Given her pain assessment as noted, treatment options were discussed and the following options were decided upon as a follow-up plan to address the patient's pain: continuation of current treatment plan for pain.         Janet Cline, APRN  5/7/2021  09:37 CDT        Part of this note may be an electronic transcription/translation of spoken language to printed text using the Dragon Dictation System.          CC:

## 2021-05-11 ENCOUNTER — INFUSION (OUTPATIENT)
Dept: ONCOLOGY | Facility: HOSPITAL | Age: 71
End: 2021-05-11

## 2021-05-11 VITALS
SYSTOLIC BLOOD PRESSURE: 120 MMHG | RESPIRATION RATE: 18 BRPM | DIASTOLIC BLOOD PRESSURE: 85 MMHG | HEART RATE: 71 BPM | TEMPERATURE: 98.4 F

## 2021-05-11 DIAGNOSIS — D50.8 IRON DEFICIENCY ANEMIA SECONDARY TO INADEQUATE DIETARY IRON INTAKE: Primary | ICD-10-CM

## 2021-05-11 PROCEDURE — 96374 THER/PROPH/DIAG INJ IV PUSH: CPT | Performed by: NURSE PRACTITIONER

## 2021-05-11 PROCEDURE — 25010000002 FERUMOXYTOL 510 MG/17ML SOLUTION 510 MG VIAL: Performed by: NURSE PRACTITIONER

## 2021-05-11 RX ORDER — DIPHENHYDRAMINE HCL 25 MG
25 CAPSULE ORAL ONCE
Status: CANCELLED | OUTPATIENT
Start: 2021-05-18 | End: 2021-05-18

## 2021-05-11 RX ORDER — SODIUM CHLORIDE 9 MG/ML
250 INJECTION, SOLUTION INTRAVENOUS ONCE
Status: CANCELLED | OUTPATIENT
Start: 2021-05-18

## 2021-05-11 RX ORDER — DIPHENHYDRAMINE HCL 25 MG
25 CAPSULE ORAL ONCE
Status: DISCONTINUED | OUTPATIENT
Start: 2021-05-11 | End: 2021-05-11 | Stop reason: HOSPADM

## 2021-05-11 RX ORDER — SODIUM CHLORIDE 9 MG/ML
250 INJECTION, SOLUTION INTRAVENOUS ONCE
Status: COMPLETED | OUTPATIENT
Start: 2021-05-11 | End: 2021-05-11

## 2021-05-11 RX ADMIN — SODIUM CHLORIDE 250 ML: 9 INJECTION, SOLUTION INTRAVENOUS at 13:43

## 2021-05-11 RX ADMIN — FERUMOXYTOL 510 MG: 510 INJECTION INTRAVENOUS at 13:43

## 2021-05-18 ENCOUNTER — INFUSION (OUTPATIENT)
Dept: ONCOLOGY | Facility: HOSPITAL | Age: 71
End: 2021-05-18

## 2021-05-18 VITALS
SYSTOLIC BLOOD PRESSURE: 121 MMHG | HEART RATE: 76 BPM | TEMPERATURE: 97.6 F | DIASTOLIC BLOOD PRESSURE: 77 MMHG | RESPIRATION RATE: 18 BRPM

## 2021-05-18 DIAGNOSIS — D50.8 IRON DEFICIENCY ANEMIA SECONDARY TO INADEQUATE DIETARY IRON INTAKE: Primary | ICD-10-CM

## 2021-05-18 PROCEDURE — 96374 THER/PROPH/DIAG INJ IV PUSH: CPT | Performed by: NURSE PRACTITIONER

## 2021-05-18 PROCEDURE — 25010000002 FERUMOXYTOL 510 MG/17ML SOLUTION 510 MG VIAL: Performed by: NURSE PRACTITIONER

## 2021-05-18 RX ORDER — DIPHENHYDRAMINE HCL 25 MG
25 CAPSULE ORAL ONCE
Status: DISCONTINUED | OUTPATIENT
Start: 2021-05-18 | End: 2021-05-18

## 2021-05-18 RX ORDER — DIPHENHYDRAMINE HCL 25 MG
25 CAPSULE ORAL ONCE
Status: CANCELLED | OUTPATIENT
Start: 2021-05-18 | End: 2021-05-18

## 2021-05-18 RX ORDER — SODIUM CHLORIDE 9 MG/ML
250 INJECTION, SOLUTION INTRAVENOUS ONCE
Status: COMPLETED | OUTPATIENT
Start: 2021-05-18 | End: 2021-05-18

## 2021-05-18 RX ORDER — SODIUM CHLORIDE 9 MG/ML
250 INJECTION, SOLUTION INTRAVENOUS ONCE
Status: CANCELLED | OUTPATIENT
Start: 2021-05-18

## 2021-05-18 RX ADMIN — SODIUM CHLORIDE 250 ML: 9 INJECTION, SOLUTION INTRAVENOUS at 13:33

## 2021-05-18 RX ADMIN — FERUMOXYTOL 510 MG: 510 INJECTION INTRAVENOUS at 13:33

## 2021-07-02 ENCOUNTER — LAB (OUTPATIENT)
Dept: ONCOLOGY | Facility: HOSPITAL | Age: 71
End: 2021-07-02

## 2021-07-02 ENCOUNTER — TELEPHONE (OUTPATIENT)
Dept: ONCOLOGY | Facility: HOSPITAL | Age: 71
End: 2021-07-02

## 2021-07-02 DIAGNOSIS — D50.8 IRON DEFICIENCY ANEMIA SECONDARY TO INADEQUATE DIETARY IRON INTAKE: ICD-10-CM

## 2021-07-02 LAB
BASOPHILS # BLD AUTO: 0.07 10*3/MM3 (ref 0–0.2)
BASOPHILS NFR BLD AUTO: 1.2 % (ref 0–1.5)
DEPRECATED RDW RBC AUTO: 43.1 FL (ref 37–54)
EOSINOPHIL # BLD AUTO: 0.29 10*3/MM3 (ref 0–0.4)
EOSINOPHIL NFR BLD AUTO: 4.8 % (ref 0.3–6.2)
ERYTHROCYTE [DISTWIDTH] IN BLOOD BY AUTOMATED COUNT: 12.3 % (ref 12.3–15.4)
FERRITIN SERPL-MCNC: 365.2 NG/ML (ref 13–150)
HCT VFR BLD AUTO: 40.6 % (ref 34–46.6)
HGB BLD-MCNC: 13.3 G/DL (ref 12–15.9)
HOLD SPECIMEN: NORMAL
IMM GRANULOCYTES # BLD AUTO: 0.02 10*3/MM3 (ref 0–0.05)
IMM GRANULOCYTES NFR BLD AUTO: 0.3 % (ref 0–0.5)
IRON 24H UR-MRATE: 103 MCG/DL (ref 37–145)
IRON SATN MFR SERPL: 36 % (ref 20–50)
LYMPHOCYTES # BLD AUTO: 1.36 10*3/MM3 (ref 0.7–3.1)
LYMPHOCYTES NFR BLD AUTO: 22.6 % (ref 19.6–45.3)
MCH RBC QN AUTO: 31.3 PG (ref 26.6–33)
MCHC RBC AUTO-ENTMCNC: 32.8 G/DL (ref 31.5–35.7)
MCV RBC AUTO: 95.5 FL (ref 79–97)
MONOCYTES # BLD AUTO: 0.4 10*3/MM3 (ref 0.1–0.9)
MONOCYTES NFR BLD AUTO: 6.6 % (ref 5–12)
NEUTROPHILS NFR BLD AUTO: 3.89 10*3/MM3 (ref 1.7–7)
NEUTROPHILS NFR BLD AUTO: 64.5 % (ref 42.7–76)
NRBC BLD AUTO-RTO: 0 /100 WBC (ref 0–0.2)
PLATELET # BLD AUTO: 206 10*3/MM3 (ref 140–450)
PMV BLD AUTO: 10.5 FL (ref 6–12)
RBC # BLD AUTO: 4.25 10*6/MM3 (ref 3.77–5.28)
TIBC SERPL-MCNC: 288 MCG/DL (ref 298–536)
TRANSFERRIN SERPL-MCNC: 193 MG/DL (ref 200–360)
WBC # BLD AUTO: 6.03 10*3/MM3 (ref 3.4–10.8)

## 2021-07-02 PROCEDURE — 84466 ASSAY OF TRANSFERRIN: CPT

## 2021-07-02 PROCEDURE — 82728 ASSAY OF FERRITIN: CPT

## 2021-07-02 PROCEDURE — 85025 COMPLETE CBC W/AUTO DIFF WBC: CPT

## 2021-07-02 PROCEDURE — 36415 COLL VENOUS BLD VENIPUNCTURE: CPT | Performed by: NURSE PRACTITIONER

## 2021-07-02 PROCEDURE — 83540 ASSAY OF IRON: CPT

## 2021-07-02 NOTE — TELEPHONE ENCOUNTER
----- Message from EVAN Tobias sent at 7/2/2021  2:13 PM CDT -----  Let her know her iron levels are much better

## 2021-11-22 ENCOUNTER — APPOINTMENT (OUTPATIENT)
Dept: ONCOLOGY | Facility: HOSPITAL | Age: 71
End: 2021-11-22

## 2021-11-24 ENCOUNTER — LAB (OUTPATIENT)
Dept: LAB | Facility: HOSPITAL | Age: 71
End: 2021-11-24

## 2021-11-24 ENCOUNTER — APPOINTMENT (OUTPATIENT)
Dept: ONCOLOGY | Facility: HOSPITAL | Age: 71
End: 2021-11-24

## 2021-11-29 ENCOUNTER — APPOINTMENT (OUTPATIENT)
Dept: ONCOLOGY | Facility: CLINIC | Age: 71
End: 2021-11-29

## 2022-01-11 ENCOUNTER — TELEPHONE (OUTPATIENT)
Dept: ONCOLOGY | Facility: CLINIC | Age: 72
End: 2022-01-11

## 2022-01-12 DIAGNOSIS — D50.8 IRON DEFICIENCY ANEMIA SECONDARY TO INADEQUATE DIETARY IRON INTAKE: Primary | ICD-10-CM

## 2022-01-20 ENCOUNTER — APPOINTMENT (OUTPATIENT)
Dept: ONCOLOGY | Facility: CLINIC | Age: 72
End: 2022-01-20

## 2022-01-21 ENCOUNTER — LAB (OUTPATIENT)
Dept: LAB | Facility: HOSPITAL | Age: 72
End: 2022-01-21

## 2022-01-21 DIAGNOSIS — D50.8 IRON DEFICIENCY ANEMIA SECONDARY TO INADEQUATE DIETARY IRON INTAKE: ICD-10-CM

## 2022-01-21 LAB
BASOPHILS # BLD AUTO: 0.05 10*3/MM3 (ref 0–0.2)
BASOPHILS NFR BLD AUTO: 0.9 % (ref 0–1.5)
DEPRECATED RDW RBC AUTO: 42.1 FL (ref 37–54)
EOSINOPHIL # BLD AUTO: 0.25 10*3/MM3 (ref 0–0.4)
EOSINOPHIL NFR BLD AUTO: 4.3 % (ref 0.3–6.2)
ERYTHROCYTE [DISTWIDTH] IN BLOOD BY AUTOMATED COUNT: 12 % (ref 12.3–15.4)
FERRITIN SERPL-MCNC: 249.9 NG/ML (ref 13–150)
HCT VFR BLD AUTO: 39.4 % (ref 34–46.6)
HGB BLD-MCNC: 13 G/DL (ref 12–15.9)
IMM GRANULOCYTES # BLD AUTO: 0.02 10*3/MM3 (ref 0–0.05)
IMM GRANULOCYTES NFR BLD AUTO: 0.3 % (ref 0–0.5)
IRON 24H UR-MRATE: 97 MCG/DL (ref 37–145)
IRON SATN MFR SERPL: 31 % (ref 20–50)
LYMPHOCYTES # BLD AUTO: 1.05 10*3/MM3 (ref 0.7–3.1)
LYMPHOCYTES NFR BLD AUTO: 18.1 % (ref 19.6–45.3)
MCH RBC QN AUTO: 31.8 PG (ref 26.6–33)
MCHC RBC AUTO-ENTMCNC: 33 G/DL (ref 31.5–35.7)
MCV RBC AUTO: 96.3 FL (ref 79–97)
MONOCYTES # BLD AUTO: 0.41 10*3/MM3 (ref 0.1–0.9)
MONOCYTES NFR BLD AUTO: 7.1 % (ref 5–12)
NEUTROPHILS NFR BLD AUTO: 4.01 10*3/MM3 (ref 1.7–7)
NEUTROPHILS NFR BLD AUTO: 69.3 % (ref 42.7–76)
NRBC BLD AUTO-RTO: 0 /100 WBC (ref 0–0.2)
PLATELET # BLD AUTO: 200 10*3/MM3 (ref 140–450)
PMV BLD AUTO: 10.9 FL (ref 6–12)
RBC # BLD AUTO: 4.09 10*6/MM3 (ref 3.77–5.28)
TIBC SERPL-MCNC: 313 MCG/DL (ref 298–536)
TRANSFERRIN SERPL-MCNC: 210 MG/DL (ref 200–360)
WBC NRBC COR # BLD: 5.79 10*3/MM3 (ref 3.4–10.8)

## 2022-01-21 PROCEDURE — 82746 ASSAY OF FOLIC ACID SERUM: CPT

## 2022-01-21 PROCEDURE — 85025 COMPLETE CBC W/AUTO DIFF WBC: CPT

## 2022-01-21 PROCEDURE — 84466 ASSAY OF TRANSFERRIN: CPT

## 2022-01-21 PROCEDURE — 36415 COLL VENOUS BLD VENIPUNCTURE: CPT

## 2022-01-21 PROCEDURE — 83540 ASSAY OF IRON: CPT

## 2022-01-21 PROCEDURE — 82728 ASSAY OF FERRITIN: CPT

## 2022-01-21 PROCEDURE — 82607 VITAMIN B-12: CPT

## 2022-01-22 LAB
FOLATE SERPL-MCNC: 10.3 NG/ML (ref 4.78–24.2)
VIT B12 BLD-MCNC: 664 PG/ML (ref 211–946)

## 2022-01-28 ENCOUNTER — OFFICE VISIT (OUTPATIENT)
Dept: ONCOLOGY | Facility: CLINIC | Age: 72
End: 2022-01-28

## 2022-01-28 VITALS
BODY MASS INDEX: 22.78 KG/M2 | DIASTOLIC BLOOD PRESSURE: 58 MMHG | TEMPERATURE: 96.5 F | HEART RATE: 75 BPM | WEIGHT: 136.9 LBS | OXYGEN SATURATION: 97 % | SYSTOLIC BLOOD PRESSURE: 95 MMHG | RESPIRATION RATE: 18 BRPM

## 2022-01-28 DIAGNOSIS — D50.8 IRON DEFICIENCY ANEMIA SECONDARY TO INADEQUATE DIETARY IRON INTAKE: Primary | ICD-10-CM

## 2022-01-28 PROCEDURE — 99213 OFFICE O/P EST LOW 20 MIN: CPT | Performed by: NURSE PRACTITIONER

## 2022-01-28 PROCEDURE — G0463 HOSPITAL OUTPT CLINIC VISIT: HCPCS | Performed by: NURSE PRACTITIONER

## 2022-02-08 NOTE — PROGRESS NOTES
"DATE OF VISIT: 1/28/2022      REASON FOR VISIT:  Follow-up for iron deficiency anemia        HISTORY OF PRESENT ILLNESS:    72-year-old female with a past medical history significant for hypertension, history of chronic back pain secondary to vertebral fracture for many years, was initially seen in consultation here at [facility] by Dr. Martinez in April 2017 for evaluation of iron deficiency anemia.  In view of patient not being able to tolerate iron by mouth she was started on intravenous Feraheme that she received 2 dose in May 2017.    Recently in January 2019 she received 2 additional doses of IV iron. She is currently taking B-12 and folic acid daily.She denies any blood in stool or urine.     Past Medical History, Past Surgical History, Social History, Family History have been reviewed and are without significant changes except as mentioned.    Review of Systems   A comprehensive 14 point review of systems was performed and was negative except as mentioned.    Medications:  The current medication list was reviewed in the EMR    ALLERGIES:    Allergies   Allergen Reactions   • Diphenhydramine Hcl Myalgia     Leg pain   • Metoclopramide Myalgia     Neck pain and drawing   • Promethazine Other (See Comments)     \"legs go wild\"       Objective      Vitals:    01/28/22 1403   BP: 95/58   Pulse: 75   Resp: 18   Temp: 96.5 °F (35.8 °C)   SpO2: 97%   Weight: 62.1 kg (136 lb 14.4 oz)   PainSc:   5     Current Status 5/11/2021   ECOG score 0       Physical Exam  GENERAL:  Not in any distress.     HEENT:  Normocephalic, Atraumatic.Mild Conjunctival pallor. No icterus.  No Facial Asymmetry noted.     NECK:  No adenopathy. No JVD.     RESPIRATORY:  Fair air entry bilateral. No rhonchi or wheezing.     CARDIOVASCULAR:  S1, S2. Regular rate and rhythm. No murmur or gallop appreciated.     ABDOMEN:  Soft, obese, nontender. Bowel sounds present in all four quadrants.  No organomegaly appreciated.     EXTREMITIES: "  No edema.No Calf Tenderness.     NEUROLOGIC:  Alert, awake and oriented ×3.       SKIN : No new skin lesion identified    I have reexamined the patient and the results are consistent with the previously documented exam. EVAN Tobias     RECENT LABS:  Glucose   Date Value Ref Range Status   04/30/2021 85 65 - 99 mg/dL Final     Sodium   Date Value Ref Range Status   04/30/2021 140 136 - 145 mmol/L Final     Potassium   Date Value Ref Range Status   04/30/2021 3.8 3.5 - 5.2 mmol/L Final     CO2   Date Value Ref Range Status   04/30/2021 31.0 (H) 22.0 - 29.0 mmol/L Final     Chloride   Date Value Ref Range Status   04/30/2021 103 98 - 107 mmol/L Final     Anion Gap   Date Value Ref Range Status   04/30/2021 6.0 5.0 - 15.0 mmol/L Final     Creatinine   Date Value Ref Range Status   04/30/2021 1.19 (H) 0.57 - 1.00 mg/dL Final     BUN   Date Value Ref Range Status   04/30/2021 11 8 - 23 mg/dL Final     BUN/Creatinine Ratio   Date Value Ref Range Status   04/30/2021 9.2 7.0 - 25.0 Final     Calcium   Date Value Ref Range Status   04/30/2021 9.3 8.6 - 10.5 mg/dL Final     eGFR Non  Amer   Date Value Ref Range Status   04/30/2021 45 (L) >60 mL/min/1.73 Final     Alkaline Phosphatase   Date Value Ref Range Status   04/30/2021 76 39 - 117 U/L Final     Total Protein   Date Value Ref Range Status   04/30/2021 6.7 6.0 - 8.5 g/dL Final     ALT (SGPT)   Date Value Ref Range Status   04/30/2021 13 1 - 33 U/L Final     AST (SGOT)   Date Value Ref Range Status   04/30/2021 18 1 - 32 U/L Final     Total Bilirubin   Date Value Ref Range Status   04/30/2021 0.3 0.0 - 1.2 mg/dL Final     Albumin   Date Value Ref Range Status   04/30/2021 3.70 3.50 - 5.20 g/dL Final     Globulin   Date Value Ref Range Status   04/30/2021 3.0 gm/dL Final     Lab Results   Component Value Date    WBC 5.79 01/21/2022    HGB 13.0 01/21/2022    HCT 39.4 01/21/2022    MCV 96.3 01/21/2022     01/21/2022     Lab Results   Component Value  Date    NEUTROABS 4.01 01/21/2022    IRON 97 01/21/2022    IRON 103 07/02/2021    IRON 56 04/30/2021    TIBC 313 01/21/2022    TIBC 288 (L) 07/02/2021    TIBC 431 04/30/2021    LABIRON 31 01/21/2022    LABIRON 36 07/02/2021    LABIRON 13 (L) 04/30/2021    FERRITIN 249.90 (H) 01/21/2022    FERRITIN 365.20 (H) 07/02/2021    FERRITIN 40.52 04/30/2021    [id number] 664 01/21/2022    [id number] 886 07/25/2019    [id number] 578 01/23/2019    FOLATE 10.30 01/21/2022    FOLATE 13.70 07/25/2019    FOLATE 7.75 01/23/2019     No results found for: , LABCA2, AFPTM, HCGQUANT, , CHROMGRNA, [id number], CEA, REFLABREPO      PATHOLOGY:  * Cannot find OR log *         RADIOLOGY DATA :  No radiology results for the last 7 days        Assessment/Plan       Iron deficiency;  questionable etiology, most likely GI loss, she follows with Dr. Santiago routinely; she had received IV iron in past; last infusion was in January 2019.  -labs reviewed today show Hgb is 13.0 with adequate iron stores.   -she is to continue with B-12 and folic acid and will recheck again in 6 mos           PHQ-9 Total Score: 0     Krystina Duong reports a pain score of 5.  Given her pain assessment as noted, treatment options were discussed and the following options were decided upon as a follow-up plan to address the patient's pain: continuation of current treatment plan for pain.         Janet Cline, APRN  2/8/2022  10:11 CST        Part of this note may be an electronic transcription/translation of spoken language to printed text using the Dragon Dictation System.          CC:

## 2022-06-08 ENCOUNTER — HOSPITAL ENCOUNTER (EMERGENCY)
Facility: HOSPITAL | Age: 72
Discharge: HOME | End: 2022-06-08
Attending: EMERGENCY MEDICINE | Admitting: EMERGENCY MEDICINE

## 2022-06-08 VITALS
OXYGEN SATURATION: 92 % | HEIGHT: 64 IN | HEART RATE: 82 BPM | RESPIRATION RATE: 18 BRPM | BODY MASS INDEX: 23.61 KG/M2 | WEIGHT: 138.3 LBS | SYSTOLIC BLOOD PRESSURE: 132 MMHG | TEMPERATURE: 97.9 F | DIASTOLIC BLOOD PRESSURE: 79 MMHG

## 2022-06-08 DIAGNOSIS — G89.29 CHRONIC THORACIC BACK PAIN, UNSPECIFIED BACK PAIN LATERALITY: Primary | ICD-10-CM

## 2022-06-08 DIAGNOSIS — R60.9 PERIPHERAL EDEMA: ICD-10-CM

## 2022-06-08 DIAGNOSIS — M54.6 CHRONIC THORACIC BACK PAIN, UNSPECIFIED BACK PAIN LATERALITY: Primary | ICD-10-CM

## 2022-06-08 PROCEDURE — 96372 THER/PROPH/DIAG INJ SC/IM: CPT

## 2022-06-08 PROCEDURE — 99282 EMERGENCY DEPT VISIT SF MDM: CPT

## 2022-06-08 PROCEDURE — 25010000002 MORPHINE PER 10 MG: Performed by: EMERGENCY MEDICINE

## 2022-06-08 RX ORDER — MORPHINE SULFATE 10 MG/ML
6 INJECTION INTRAMUSCULAR; INTRAVENOUS; SUBCUTANEOUS ONCE
Status: COMPLETED | OUTPATIENT
Start: 2022-06-08 | End: 2022-06-08

## 2022-06-08 RX ADMIN — MORPHINE SULFATE 6 MG: 10 INJECTION INTRAVENOUS at 01:07

## 2022-06-08 NOTE — ED PROVIDER NOTES
"Subjective   72-year-old female presents to the emergency department with complaint of thoracic back pain.  She reports chronic back pain for years since an injury.  Denies any previous surgery.  She is in pain management and is prescribed tramadol that she reports she gets her restless legs and also takes Percocet for her back pain.  She also reports being on a muscle relaxer.  She reports that the pain is worsened over the last few days but denies any new injury or instigating factor.  Denies any weakness, numbness, tingling or saddle anesthesia.  She also complains of lower extremity swelling.  Denies any shortness of breath.    Family history, surgical history, social history, current medications and allergies are reviewed with the patient and triage documentation and vitals are reviewed.      History provided by:  Patient   used: No        Review of Systems   Cardiovascular: Positive for leg swelling.   Musculoskeletal: Positive for back pain.   All other systems reviewed and are negative.      Past Medical History:   Diagnosis Date   • Anemia    • History of transfusion    • Hypertension        Allergies   Allergen Reactions   • Diphenhydramine Hcl Myalgia     Leg pain   • Metoclopramide Myalgia     Neck pain and drawing   • Promethazine Other (See Comments)     \"legs go wild\"       Past Surgical History:   Procedure Laterality Date   • CHOLECYSTECTOMY     • COLONOSCOPY N/A 1/10/2018    Procedure: COLONOSCOPY;  Surgeon: Elton Santiago DO;  Location: [facility] ENDOSCOPY;  Service:    • ENDOSCOPY N/A 1/10/2018    Procedure: ESOPHAGOGASTRODUODENOSCOPY;  Surgeon: Elton Santiago DO;  Location: [facility] ENDOSCOPY;  Service:    • ENDOSCOPY N/A 1/31/2018    Procedure: ESOPHAGOGASTRODUODENOSCOPY;  Surgeon: Elton Santiago DO;  Location: [facility] ENDOSCOPY;  Service:    • ENDOSCOPY N/A 4/4/2018    Procedure: ESOPHAGOGASTRODUODENOSCOPY;  Surgeon: Elton Santiago DO;  Location: [facility] ENDOSCOPY;  " Service: Gastroenterology   • ENDOSCOPY N/A 8/15/2018    Procedure: ESOPHAGOGASTRODUODENOSCOPY;  Surgeon: Elton Santiago DO;  Location: [facility] ENDOSCOPY;  Service: Gastroenterology   • FRACTURE SURGERY      ankle   • HYSTERECTOMY     • LAPAROSCOPIC CHOLECYSTECTOMY         History reviewed. No pertinent family history.    Social History     Socioeconomic History   • Marital status:    Tobacco Use   • Smoking status: Never Smoker   • Smokeless tobacco: Never Used   Substance and Sexual Activity   • Alcohol use: No   • Drug use: No   • Sexual activity: Defer           Objective   Physical Exam  Vitals and nursing note reviewed.   Constitutional:       Appearance: Normal appearance. She is normal weight.   Cardiovascular:      Rate and Rhythm: Normal rate and regular rhythm.      Pulses: Normal pulses.   Pulmonary:      Effort: Pulmonary effort is normal.      Breath sounds: Normal breath sounds.   Musculoskeletal:      Right lower leg: Edema (mild non-pitting) present.      Left lower leg: Edema (mild non-pitting) present.   Skin:     General: Skin is warm and dry.      Capillary Refill: Capillary refill takes less than 2 seconds.   Neurological:      Mental Status: She is alert.         Procedures  none         ED Course    Labs Reviewed - No data to display  No results found.          MDM  Number of Diagnoses or Management Options  Patient Progress  Patient progress: stable    Patient given 1 IM injection of morphine for acute exacerbation of her chronic pain and discussed no further outpatient prescriptions will be written and she was follow-up with pain management.  No indication of swelling being related to anything other than some peripheral edema.  Advised on elevating extremities and compression socks and following up with primary care.    Final diagnoses:   Chronic thoracic back pain, unspecified back pain laterality   Peripheral edema       ED Disposition  ED Disposition     ED Disposition    Discharge    Condition   Stable    Comment   --             Mack Cameron MD  [address] [id number] [phone]          Elias Escamilla MD  [address] [place] [id number] [phone]               Medication List      No changes were made to your prescriptions during this visit.          Teddy Anguiano,   06/08/22 0352

## 2022-08-01 ENCOUNTER — OFFICE VISIT (OUTPATIENT)
Dept: ONCOLOGY | Facility: CLINIC | Age: 72
End: 2022-08-01

## 2022-08-01 ENCOUNTER — LAB (OUTPATIENT)
Dept: ONCOLOGY | Facility: HOSPITAL | Age: 72
End: 2022-08-01

## 2022-08-01 VITALS
OXYGEN SATURATION: 96 % | WEIGHT: 128.2 LBS | SYSTOLIC BLOOD PRESSURE: 120 MMHG | BODY MASS INDEX: 22.01 KG/M2 | RESPIRATION RATE: 18 BRPM | HEART RATE: 65 BPM | TEMPERATURE: 98.2 F | DIASTOLIC BLOOD PRESSURE: 81 MMHG

## 2022-08-01 DIAGNOSIS — D50.8 IRON DEFICIENCY ANEMIA SECONDARY TO INADEQUATE DIETARY IRON INTAKE: ICD-10-CM

## 2022-08-01 LAB
BASOPHILS # BLD AUTO: 0.06 10*3/MM3 (ref 0–0.2)
BASOPHILS NFR BLD AUTO: 1.1 % (ref 0–1.5)
DEPRECATED RDW RBC AUTO: 41.4 FL (ref 37–54)
EOSINOPHIL # BLD AUTO: 0.32 10*3/MM3 (ref 0–0.4)
EOSINOPHIL NFR BLD AUTO: 5.8 % (ref 0.3–6.2)
ERYTHROCYTE [DISTWIDTH] IN BLOOD BY AUTOMATED COUNT: 11.9 % (ref 12.3–15.4)
FERRITIN SERPL-MCNC: 201.8 NG/ML (ref 13–150)
FOLATE SERPL-MCNC: 7.28 NG/ML (ref 4.78–24.2)
HCT VFR BLD AUTO: 37.1 % (ref 34–46.6)
HGB BLD-MCNC: 12.3 G/DL (ref 12–15.9)
IMM GRANULOCYTES # BLD AUTO: 0.02 10*3/MM3 (ref 0–0.05)
IMM GRANULOCYTES NFR BLD AUTO: 0.4 % (ref 0–0.5)
IRON 24H UR-MRATE: 89 MCG/DL (ref 37–145)
IRON SATN MFR SERPL: 28 % (ref 20–50)
LYMPHOCYTES # BLD AUTO: 1.28 10*3/MM3 (ref 0.7–3.1)
LYMPHOCYTES NFR BLD AUTO: 23.3 % (ref 19.6–45.3)
MCH RBC QN AUTO: 31.8 PG (ref 26.6–33)
MCHC RBC AUTO-ENTMCNC: 33.2 G/DL (ref 31.5–35.7)
MCV RBC AUTO: 95.9 FL (ref 79–97)
MONOCYTES # BLD AUTO: 0.4 10*3/MM3 (ref 0.1–0.9)
MONOCYTES NFR BLD AUTO: 7.3 % (ref 5–12)
NEUTROPHILS NFR BLD AUTO: 3.41 10*3/MM3 (ref 1.7–7)
NEUTROPHILS NFR BLD AUTO: 62.1 % (ref 42.7–76)
NRBC BLD AUTO-RTO: 0 /100 WBC (ref 0–0.2)
PLATELET # BLD AUTO: 168 10*3/MM3 (ref 140–450)
PMV BLD AUTO: 10.3 FL (ref 6–12)
RBC # BLD AUTO: 3.87 10*6/MM3 (ref 3.77–5.28)
TIBC SERPL-MCNC: 320 MCG/DL (ref 298–536)
TRANSFERRIN SERPL-MCNC: 215 MG/DL (ref 200–360)
VIT B12 BLD-MCNC: 569 PG/ML (ref 211–946)
WBC NRBC COR # BLD: 5.49 10*3/MM3 (ref 3.4–10.8)

## 2022-08-01 PROCEDURE — 82728 ASSAY OF FERRITIN: CPT

## 2022-08-01 PROCEDURE — 82746 ASSAY OF FOLIC ACID SERUM: CPT

## 2022-08-01 PROCEDURE — 99213 OFFICE O/P EST LOW 20 MIN: CPT | Performed by: NURSE PRACTITIONER

## 2022-08-01 PROCEDURE — 83540 ASSAY OF IRON: CPT

## 2022-08-01 PROCEDURE — 82607 VITAMIN B-12: CPT

## 2022-08-01 PROCEDURE — 84466 ASSAY OF TRANSFERRIN: CPT

## 2022-08-01 PROCEDURE — G0463 HOSPITAL OUTPT CLINIC VISIT: HCPCS | Performed by: NURSE PRACTITIONER

## 2022-08-01 PROCEDURE — 85025 COMPLETE CBC W/AUTO DIFF WBC: CPT

## 2022-08-01 NOTE — PROGRESS NOTES
"DATE OF VISIT: 8/1/2022      REASON FOR VISIT:  Follow-up for anemia    HISTORY OF PRESENT ILLNESS:   72-year-old female with a past medical history significant for hypertension, history of chronic back pain secondary to vertebral fracture for many years, was initially seen in consultation here at [facility] by Dr. Martinez in April 2017 for evaluation of iron deficiency anemia.  In view of patient not being able to tolerate iron by mouth she received IV iron; her last dose of IV iron was in January 2019.  She is currently taking B-12 and folic acid OTC.      Past Medical History, Past Surgical History, Social History, Family History have been reviewed and are without significant changes except as mentioned.    Review of Systems   A comprehensive 14 point review of systems was performed and was negative except as mentioned.    Medications:  The current medication list was reviewed in the EMR    ALLERGIES:    Allergies   Allergen Reactions   • Diphenhydramine Hcl Myalgia     Leg pain   • Metoclopramide Myalgia     Neck pain and drawing   • Promethazine Other (See Comments)     \"legs go wild\"       Objective      Vitals:    08/01/22 1301   BP: 120/81   Pulse: 65   Resp: 18   Temp: 98.2 °F (36.8 °C)   SpO2: 96%   Weight: 58.2 kg (128 lb 3.2 oz)   PainSc: 6  Comment: back     Current Status 5/11/2021   ECOG score 0       Physical Exam  General:alert and oriented no acute distress  Lungs; CTAB  Card;RRR  Ext; no edema    RECENT LABS:  Glucose   Date Value Ref Range Status   04/30/2021 85 65 - 99 mg/dL Final     Sodium   Date Value Ref Range Status   04/30/2021 140 136 - 145 mmol/L Final   03/07/2019 143 134 - 144 mmol/L Final     Potassium   Date Value Ref Range Status   04/30/2021 3.8 3.5 - 5.2 mmol/L Final   03/07/2019 4.1 3.5 - 5.1 mmol/L Final     CO2   Date Value Ref Range Status   04/30/2021 31.0 (H) 22.0 - 29.0 mmol/L Final     Chloride   Date Value Ref Range Status   04/30/2021 103 98 - 107 mmol/L Final "   03/07/2019 104 98 - 107 mmol/L Final     Anion Gap   Date Value Ref Range Status   04/30/2021 6.0 5.0 - 15.0 mmol/L Final     Creatinine   Date Value Ref Range Status   04/30/2021 1.19 (H) 0.57 - 1.00 mg/dL Final   03/07/2019 1 0.6 - 1.3 mg/dL Final     Comment:     **The MDRD GFR formula is valid only for ages 18-70.    GFR will not be reported for individuals aging <18 or >70.     BUN   Date Value Ref Range Status   04/30/2021 11 8 - 23 mg/dL Final   03/07/2019 10 7 - 18 mg/dL Final     BUN/Creatinine Ratio   Date Value Ref Range Status   04/30/2021 9.2 7.0 - 25.0 Final     Calcium   Date Value Ref Range Status   04/30/2021 9.3 8.6 - 10.5 mg/dL Final   03/07/2019 9.1 8.8 - 10.5 mg/dL Final     eGFR Non  Amer   Date Value Ref Range Status   04/30/2021 45 (L) >60 mL/min/1.73 Final     Alkaline Phosphatase   Date Value Ref Range Status   04/30/2021 76 39 - 117 U/L Final   03/07/2019 66 46 - 116 U/L Final     Total Protein   Date Value Ref Range Status   04/30/2021 6.7 6.0 - 8.5 g/dL Final   03/07/2019 6.3 (L) 6.4 - 8.2 g/dL Final     ALT (SGPT)   Date Value Ref Range Status   04/30/2021 13 1 - 33 U/L Final   03/07/2019 36 30 - 65 U/L Final     AST (SGOT)   Date Value Ref Range Status   04/30/2021 18 1 - 32 U/L Final   03/07/2019 30 15 - 37 U/L Final     Total Bilirubin   Date Value Ref Range Status   04/30/2021 0.3 0.0 - 1.2 mg/dL Final   03/07/2019 0.2 0 - 1.0 mg/dL Final     Albumin   Date Value Ref Range Status   04/30/2021 3.70 3.50 - 5.20 g/dL Final   03/07/2019 3.2 (L) 3.4 - 5.0 g/dL Final     Globulin   Date Value Ref Range Status   04/30/2021 3.0 gm/dL Final     Lab Results   Component Value Date    WBC 5.49 08/01/2022    HGB 12.3 08/01/2022    HCT 37.1 08/01/2022    MCV 95.9 08/01/2022     08/01/2022     Lab Results   Component Value Date    NEUTROABS 3.41 08/01/2022    IRON 89 08/01/2022    IRON 97 01/21/2022    IRON 103 07/02/2021    TIBC 320 08/01/2022    TIBC 313 01/21/2022    TIBC 288  (L) 07/02/2021    LABIRON 28 08/01/2022    LABIRON 31 01/21/2022    LABIRON 36 07/02/2021    FERRITIN 201.80 (H) 08/01/2022    FERRITIN 249.90 (H) 01/21/2022    FERRITIN 365.20 (H) 07/02/2021    [id number] 664 01/21/2022    [id number] 886 07/25/2019    [id number] 578 01/23/2019    FOLATE 10.30 01/21/2022    FOLATE 13.70 07/25/2019    FOLATE 7.75 01/23/2019     No results found for: , LABCA2, AFPTM, HCGQUANT, , CHROMGRNA, [id number], CEA, REFLABREPO          RADIOLOGY DATA :  No radiology results for the last 7 days        Assessment & Plan      Anemia; previous iron deficiency that responded to IV replacement therapy.  -labs reviewed today show Hgb is 12.3 with normal iron studies.   -recommend that she continue with B-12 and folic acid  -recheck again in 6 mos.                 PHQ-9 Total Score: 0     Krystinaofe Duong reports a pain score of 6.  Given her pain assessment as noted, treatment options were discussed and the following options were decided upon as a follow-up plan to address the patient's pain: continuation of current treatment plan for pain.         Janet Cline, EVAN  8/1/2022  14:00 CDT        Part of this note may be an electronic transcription/translation of spoken language to printed text using the Dragon Dictation System.          CC:

## 2023-01-25 DIAGNOSIS — D50.8 IRON DEFICIENCY ANEMIA SECONDARY TO INADEQUATE DIETARY IRON INTAKE: Primary | ICD-10-CM

## 2023-01-27 ENCOUNTER — TELEPHONE (OUTPATIENT)
Dept: ONCOLOGY | Facility: CLINIC | Age: 73
End: 2023-01-27
Payer: MEDICARE

## 2023-01-27 NOTE — TELEPHONE ENCOUNTER
Left message that her appt on 1-31-23 we had to change the appt time to 12:45 for lab and 1:15 for kevin

## 2023-04-28 ENCOUNTER — OFFICE VISIT (OUTPATIENT)
Dept: ONCOLOGY | Facility: CLINIC | Age: 73
End: 2023-04-28
Payer: MEDICARE

## 2023-04-28 ENCOUNTER — LAB (OUTPATIENT)
Dept: ONCOLOGY | Facility: HOSPITAL | Age: 73
End: 2023-04-28
Payer: MEDICARE

## 2023-04-28 VITALS
RESPIRATION RATE: 18 BRPM | BODY MASS INDEX: 22.31 KG/M2 | OXYGEN SATURATION: 98 % | HEART RATE: 74 BPM | WEIGHT: 130 LBS | DIASTOLIC BLOOD PRESSURE: 80 MMHG | SYSTOLIC BLOOD PRESSURE: 139 MMHG

## 2023-04-28 DIAGNOSIS — D50.8 IRON DEFICIENCY ANEMIA SECONDARY TO INADEQUATE DIETARY IRON INTAKE: Primary | ICD-10-CM

## 2023-04-28 DIAGNOSIS — R53.83 OTHER FATIGUE: ICD-10-CM

## 2023-04-28 DIAGNOSIS — D50.8 IRON DEFICIENCY ANEMIA SECONDARY TO INADEQUATE DIETARY IRON INTAKE: ICD-10-CM

## 2023-04-28 LAB
ALBUMIN SERPL-MCNC: 3.4 G/DL (ref 3.5–5.2)
ALBUMIN/GLOB SERPL: 1.5 G/DL
ALP SERPL-CCNC: 59 U/L (ref 39–117)
ALT SERPL W P-5'-P-CCNC: 23 U/L (ref 1–33)
ANION GAP SERPL CALCULATED.3IONS-SCNC: 9 MMOL/L (ref 5–15)
AST SERPL-CCNC: 23 U/L (ref 1–32)
BASOPHILS # BLD AUTO: 0.07 10*3/MM3 (ref 0–0.2)
BASOPHILS NFR BLD AUTO: 1.1 % (ref 0–1.5)
BILIRUB SERPL-MCNC: 0.2 MG/DL (ref 0–1.2)
BUN SERPL-MCNC: 15 MG/DL (ref 8–23)
BUN/CREAT SERPL: 13.9 (ref 7–25)
CALCIUM SPEC-SCNC: 8.8 MG/DL (ref 8.6–10.5)
CHLORIDE SERPL-SCNC: 103 MMOL/L (ref 98–107)
CO2 SERPL-SCNC: 28 MMOL/L (ref 22–29)
CREAT SERPL-MCNC: 1.08 MG/DL (ref 0.57–1)
DEPRECATED RDW RBC AUTO: 42.3 FL (ref 37–54)
EGFRCR SERPLBLD CKD-EPI 2021: 55 ML/MIN/1.73
EOSINOPHIL # BLD AUTO: 0.25 10*3/MM3 (ref 0–0.4)
EOSINOPHIL NFR BLD AUTO: 4.1 % (ref 0.3–6.2)
ERYTHROCYTE [DISTWIDTH] IN BLOOD BY AUTOMATED COUNT: 12 % (ref 12.3–15.4)
FERRITIN SERPL-MCNC: 110.4 NG/ML (ref 13–150)
FOLATE SERPL-MCNC: >20 NG/ML (ref 4.78–24.2)
GLOBULIN UR ELPH-MCNC: 2.2 GM/DL
GLUCOSE SERPL-MCNC: 168 MG/DL (ref 65–99)
HCT VFR BLD AUTO: 35.8 % (ref 34–46.6)
HGB BLD-MCNC: 11.8 G/DL (ref 12–15.9)
HOLD SPECIMEN: NORMAL
IMM GRANULOCYTES # BLD AUTO: 0.02 10*3/MM3 (ref 0–0.05)
IMM GRANULOCYTES NFR BLD AUTO: 0.3 % (ref 0–0.5)
IRON 24H UR-MRATE: 82 MCG/DL (ref 37–145)
IRON SATN MFR SERPL: 25 % (ref 20–50)
LYMPHOCYTES # BLD AUTO: 1.05 10*3/MM3 (ref 0.7–3.1)
LYMPHOCYTES NFR BLD AUTO: 17.1 % (ref 19.6–45.3)
MCH RBC QN AUTO: 31.6 PG (ref 26.6–33)
MCHC RBC AUTO-ENTMCNC: 33 G/DL (ref 31.5–35.7)
MCV RBC AUTO: 95.7 FL (ref 79–97)
MONOCYTES # BLD AUTO: 0.42 10*3/MM3 (ref 0.1–0.9)
MONOCYTES NFR BLD AUTO: 6.9 % (ref 5–12)
NEUTROPHILS NFR BLD AUTO: 4.32 10*3/MM3 (ref 1.7–7)
NEUTROPHILS NFR BLD AUTO: 70.5 % (ref 42.7–76)
NRBC BLD AUTO-RTO: 0 /100 WBC (ref 0–0.2)
PLATELET # BLD AUTO: 210 10*3/MM3 (ref 140–450)
PMV BLD AUTO: 9.5 FL (ref 6–12)
POTASSIUM SERPL-SCNC: 3.4 MMOL/L (ref 3.5–5.2)
PROT SERPL-MCNC: 5.6 G/DL (ref 6–8.5)
RBC # BLD AUTO: 3.74 10*6/MM3 (ref 3.77–5.28)
SODIUM SERPL-SCNC: 140 MMOL/L (ref 136–145)
TIBC SERPL-MCNC: 329 MCG/DL (ref 298–536)
TRANSFERRIN SERPL-MCNC: 221 MG/DL (ref 200–360)
TSH SERPL DL<=0.05 MIU/L-ACNC: 1.56 UIU/ML (ref 0.27–4.2)
VIT B12 BLD-MCNC: 1290 PG/ML (ref 211–946)
WBC NRBC COR # BLD: 6.13 10*3/MM3 (ref 3.4–10.8)

## 2023-05-01 NOTE — PROGRESS NOTES
"DATE OF VISIT: 4/28/2023      REASON FOR VISIT:  Follow-up for anemia       HISTORY OF PRESENT ILLNESS:   73-year-old female with a past medical history significant for hypertension, history of chronic back pain secondary to vertebral fracture for many years, was initially seen in consultation here at [facility] by Dr. Martinez in April 2017 for evaluation of iron deficiency anemia.  In view of patient not being able to tolerate iron by mouth she received IV iron; her last dose of IV iron was in January 2019.  She is currently taking B-12 and folic acid OTC.  She denies any bleeding; she does report more fatigue and weakness.         Past Medical History, Past Surgical History, Social History, Family History have been reviewed and are without significant changes except as mentioned.    Review of Systems   A comprehensive 14 point review of systems was performed and was negative except as mentioned.    Medications:  The current medication list was reviewed in the EMR    ALLERGIES:    Allergies   Allergen Reactions   • Diphenhydramine Hcl Myalgia     Leg pain   • Metoclopramide Myalgia     Neck pain and drawing   • Promethazine Other (See Comments)     \"legs go wild\"       Objective      Vitals:    04/28/23 1312   BP: 139/80   Pulse: 74   Resp: 18   SpO2: 98%   Weight: 59 kg (130 lb)   PainSc: 0-No pain         5/11/2021     1:14 PM   Current Status   ECOG score 0       Physical Exam  General: alert and oriented no acute distress  Lungs;normal breath sounds  Card; RRR  Ext; no edema    RECENT LABS:  Glucose   Date Value Ref Range Status   04/28/2023 168 (H) 65 - 99 mg/dL Final     Sodium   Date Value Ref Range Status   04/28/2023 140 136 - 145 mmol/L Final   03/07/2019 143 134 - 144 mmol/L Final     Potassium   Date Value Ref Range Status   04/28/2023 3.4 (L) 3.5 - 5.2 mmol/L Final   03/07/2019 4.1 3.5 - 5.1 mmol/L Final     CO2   Date Value Ref Range Status   04/28/2023 28.0 22.0 - 29.0 mmol/L Final "     Chloride   Date Value Ref Range Status   04/28/2023 103 98 - 107 mmol/L Final   03/07/2019 104 98 - 107 mmol/L Final     Anion Gap   Date Value Ref Range Status   04/28/2023 9.0 5.0 - 15.0 mmol/L Final     Creatinine   Date Value Ref Range Status   04/28/2023 1.08 (H) 0.57 - 1.00 mg/dL Final   03/07/2019 1 0.6 - 1.3 mg/dL Final     Comment:     **The MDRD GFR formula is valid only for ages 18-70.    GFR will not be reported for individuals aging <18 or >70.     BUN   Date Value Ref Range Status   04/28/2023 15 8 - 23 mg/dL Final   03/07/2019 10 7 - 18 mg/dL Final     BUN/Creatinine Ratio   Date Value Ref Range Status   04/28/2023 13.9 7.0 - 25.0 Final     Calcium   Date Value Ref Range Status   04/28/2023 8.8 8.6 - 10.5 mg/dL Final   03/07/2019 9.1 8.8 - 10.5 mg/dL Final     eGFR Non  Amer   Date Value Ref Range Status   04/30/2021 45 (L) >60 mL/min/1.73 Final     Alkaline Phosphatase   Date Value Ref Range Status   04/28/2023 59 39 - 117 U/L Final   03/07/2019 66 46 - 116 U/L Final     Total Protein   Date Value Ref Range Status   04/28/2023 5.6 (L) 6.0 - 8.5 g/dL Final   03/07/2019 6.3 (L) 6.4 - 8.2 g/dL Final     ALT (SGPT)   Date Value Ref Range Status   04/28/2023 23 1 - 33 U/L Final   03/07/2019 36 30 - 65 U/L Final     AST (SGOT)   Date Value Ref Range Status   04/28/2023 23 1 - 32 U/L Final   03/07/2019 30 15 - 37 U/L Final     Total Bilirubin   Date Value Ref Range Status   04/28/2023 0.2 0.0 - 1.2 mg/dL Final   03/07/2019 0.2 0 - 1.0 mg/dL Final     Albumin   Date Value Ref Range Status   04/28/2023 3.4 (L) 3.5 - 5.2 g/dL Final   03/07/2019 3.2 (L) 3.4 - 5.0 g/dL Final     Globulin   Date Value Ref Range Status   04/28/2023 2.2 gm/dL Final     Lab Results   Component Value Date    WBC 6.13 04/28/2023    HGB 11.8 (L) 04/28/2023    HCT 35.8 04/28/2023    MCV 95.7 04/28/2023     04/28/2023     Lab Results   Component Value Date    NEUTROABS 4.32 04/28/2023    IRON 82 04/28/2023    IRON 89  08/01/2022    IRON 97 01/21/2022    TIBC 329 04/28/2023    TIBC 320 08/01/2022    TIBC 313 01/21/2022    LABIRON 25 04/28/2023    LABIRON 28 08/01/2022    LABIRON 31 01/21/2022    FERRITIN 110.40 04/28/2023    FERRITIN 201.80 (H) 08/01/2022    FERRITIN 249.90 (H) 01/21/2022    [id number] 1,290 (H) 04/28/2023    [id number] 569 08/01/2022    [id number] 664 01/21/2022    FOLATE >20.00 04/28/2023    FOLATE 7.28 08/01/2022    FOLATE 10.30 01/21/2022     No results found for: , LABCA2, AFPTM, HCGQUANT, , CHROMGRNA, [id number], CEA, REFLABREPO      RADIOLOGY DATA :  No radiology results for the last 7 days        Assessment & Plan       Anemia; previous iron deficiency that responded to IV replacement therapy. She is currently on OTC B-12 and folic acid.  -Labs reviewed today Hgb is 11.8 with normal iron studies and normal B-12 and folate level; would recommend she continue with OTC supplements.  I will check TSH today since she is reporting more fatigue; she is seeing her PCP later next week as well.  -RCT 6 mos with CBC and anemia profile.           PHQ-9 Total Score: 0     Krystina Duong reports a pain score of 0.  Given her pain assessment as noted, treatment options were discussed and the following options were decided upon as a follow-up plan to address the patient's pain: no pain thea.         Janet Cline, APRN  5/1/2023  09:34 CDT        Part of this note may be an electronic transcription/translation of spoken language to printed text using the Dragon Dictation System.          CC:

## 2023-07-28 ENCOUNTER — APPOINTMENT (OUTPATIENT)
Dept: CARDIOLOGY | Facility: HOSPITAL | Age: 73
DRG: 948 | End: 2023-07-28
Payer: MEDICARE

## 2023-07-28 ENCOUNTER — HOSPITAL ENCOUNTER (INPATIENT)
Facility: HOSPITAL | Age: 73
LOS: 1 days | Discharge: HOME | DRG: 948 | End: 2023-07-29
Attending: STUDENT IN AN ORGANIZED HEALTH CARE EDUCATION/TRAINING PROGRAM | Admitting: HOSPITALIST
Payer: MEDICARE

## 2023-07-28 ENCOUNTER — APPOINTMENT (OUTPATIENT)
Dept: CT IMAGING | Facility: HOSPITAL | Age: 73
DRG: 948 | End: 2023-07-28
Payer: MEDICARE

## 2023-07-28 ENCOUNTER — APPOINTMENT (OUTPATIENT)
Dept: GENERAL RADIOLOGY | Facility: HOSPITAL | Age: 73
DRG: 948 | End: 2023-07-28
Payer: MEDICARE

## 2023-07-28 ENCOUNTER — APPOINTMENT (OUTPATIENT)
Dept: MRI IMAGING | Facility: HOSPITAL | Age: 73
DRG: 948 | End: 2023-07-28
Payer: MEDICARE

## 2023-07-28 DIAGNOSIS — R26.9 GAIT ABNORMALITY: ICD-10-CM

## 2023-07-28 DIAGNOSIS — N17.9 AKI (ACUTE KIDNEY INJURY): ICD-10-CM

## 2023-07-28 DIAGNOSIS — Z78.9 IMPAIRED MOBILITY AND ADLS: ICD-10-CM

## 2023-07-28 DIAGNOSIS — M54.6 ACUTE MIDLINE THORACIC BACK PAIN: ICD-10-CM

## 2023-07-28 DIAGNOSIS — Z74.09 IMPAIRED MOBILITY AND ADLS: ICD-10-CM

## 2023-07-28 DIAGNOSIS — R55 SYNCOPE, UNSPECIFIED SYNCOPE TYPE: Primary | ICD-10-CM

## 2023-07-28 DIAGNOSIS — Z74.09 IMPAIRED FUNCTIONAL MOBILITY, BALANCE, GAIT, AND ENDURANCE: ICD-10-CM

## 2023-07-28 PROBLEM — I63.9 STROKE: Status: ACTIVE | Noted: 2023-07-28

## 2023-07-28 PROBLEM — E87.6 HYPOKALEMIA: Status: ACTIVE | Noted: 2023-07-28

## 2023-07-28 PROBLEM — D51.9 ANEMIA DUE TO VITAMIN B12 DEFICIENCY: Status: ACTIVE | Noted: 2023-07-28

## 2023-07-28 PROBLEM — E78.00 HYPERCHOLESTEROLEMIA: Status: ACTIVE | Noted: 2020-03-19

## 2023-07-28 PROBLEM — F32.A DEPRESSIVE DISORDER: Status: ACTIVE | Noted: 2019-05-20

## 2023-07-28 PROBLEM — S22.000A CLOSED COMPRESSION FRACTURE OF THORACIC VERTEBRA: Status: ACTIVE | Noted: 2018-08-24

## 2023-07-28 LAB
ABO GROUP BLD: NORMAL
ALBUMIN SERPL-MCNC: 3.3 G/DL (ref 3.5–5.2)
ALBUMIN/GLOB SERPL: 1.1 G/DL
ALP SERPL-CCNC: 60 U/L (ref 39–117)
ALT SERPL W P-5'-P-CCNC: 15 U/L (ref 1–33)
ANION GAP SERPL CALCULATED.3IONS-SCNC: 8 MMOL/L (ref 5–15)
APTT PPP: 29.4 SECONDS (ref 20–40.3)
AST SERPL-CCNC: 22 U/L (ref 1–32)
BACTERIA UR QL AUTO: ABNORMAL /HPF
BASOPHILS # BLD AUTO: 0.06 10*3/MM3 (ref 0–0.2)
BASOPHILS NFR BLD AUTO: 1.1 % (ref 0–1.5)
BH CV ECHO MEAS - ACS: 1.95 CM
BH CV ECHO MEAS - AO MAX PG: 6.6 MMHG
BH CV ECHO MEAS - AO MEAN PG: 4 MMHG
BH CV ECHO MEAS - AO ROOT DIAM: 3.3 CM
BH CV ECHO MEAS - AO V2 MAX: 128 CM/SEC
BH CV ECHO MEAS - AO V2 VTI: 23.3 CM
BH CV ECHO MEAS - AVA(I,D): 2.14 CM2
BH CV ECHO MEAS - EDV(CUBED): 52.9 ML
BH CV ECHO MEAS - EDV(MOD-SP2): 46 ML
BH CV ECHO MEAS - EDV(MOD-SP4): 51.8 ML
BH CV ECHO MEAS - EF(MOD-BP): 64.4 %
BH CV ECHO MEAS - EF(MOD-SP2): 63.5 %
BH CV ECHO MEAS - EF(MOD-SP4): 64.3 %
BH CV ECHO MEAS - ESV(CUBED): 17.6 ML
BH CV ECHO MEAS - ESV(MOD-SP2): 16.8 ML
BH CV ECHO MEAS - ESV(MOD-SP4): 18.5 ML
BH CV ECHO MEAS - FS: 30.7 %
BH CV ECHO MEAS - IVS/LVPW: 0.96 CM
BH CV ECHO MEAS - IVSD: 1 CM
BH CV ECHO MEAS - LA DIMENSION: 3 CM
BH CV ECHO MEAS - LAT PEAK E' VEL: 9 CM/SEC
BH CV ECHO MEAS - LV DIASTOLIC VOL/BSA (35-75): 30.8 CM2
BH CV ECHO MEAS - LV MASS(C)D: 118.2 GRAMS
BH CV ECHO MEAS - LV MAX PG: 4.6 MMHG
BH CV ECHO MEAS - LV MEAN PG: 2 MMHG
BH CV ECHO MEAS - LV SYSTOLIC VOL/BSA (12-30): 11 CM2
BH CV ECHO MEAS - LV V1 MAX: 107 CM/SEC
BH CV ECHO MEAS - LV V1 VTI: 19.6 CM
BH CV ECHO MEAS - LVIDD: 3.8 CM
BH CV ECHO MEAS - LVIDS: 2.6 CM
BH CV ECHO MEAS - LVOT AREA: 2.5 CM2
BH CV ECHO MEAS - LVOT DIAM: 1.8 CM
BH CV ECHO MEAS - LVPWD: 1.04 CM
BH CV ECHO MEAS - MED PEAK E' VEL: 7.5 CM/SEC
BH CV ECHO MEAS - MV A MAX VEL: 111 CM/SEC
BH CV ECHO MEAS - MV DEC SLOPE: 1269 CM/SEC2
BH CV ECHO MEAS - MV DEC TIME: 0.19 MSEC
BH CV ECHO MEAS - MV E MAX VEL: 81.8 CM/SEC
BH CV ECHO MEAS - MV E/A: 0.74
BH CV ECHO MEAS - MV MAX PG: 6.5 MMHG
BH CV ECHO MEAS - MV MEAN PG: 3.5 MMHG
BH CV ECHO MEAS - MV P1/2T: 25.2 MSEC
BH CV ECHO MEAS - MV V2 VTI: 23.4 CM
BH CV ECHO MEAS - MVA(P1/2T): 8.7 CM2
BH CV ECHO MEAS - MVA(VTI): 2.13 CM2
BH CV ECHO MEAS - PA V2 MAX: 100.4 CM/SEC
BH CV ECHO MEAS - RAP SYSTOLE: 3 MMHG
BH CV ECHO MEAS - RVDD: 2.27 CM
BH CV ECHO MEAS - RVSP: 50.5 MMHG
BH CV ECHO MEAS - SI(MOD-SP2): 17.3 ML/M2
BH CV ECHO MEAS - SI(MOD-SP4): 19.8 ML/M2
BH CV ECHO MEAS - SV(LVOT): 49.8 ML
BH CV ECHO MEAS - SV(MOD-SP2): 29.2 ML
BH CV ECHO MEAS - SV(MOD-SP4): 33.3 ML
BH CV ECHO MEAS - TAPSE (>1.6): 2.26 CM
BH CV ECHO MEAS - TR MAX PG: 47.5 MMHG
BH CV ECHO MEAS - TR MAX VEL: 344.8 CM/SEC
BH CV ECHO MEASUREMENTS AVERAGE E/E' RATIO: 9.92
BH CV ECHO SHUNT ASSESSMENT PERFORMED (HIDDEN SCRIPTING): 1
BILIRUB SERPL-MCNC: 0.2 MG/DL (ref 0–1.2)
BILIRUB UR QL STRIP: NEGATIVE
BLD GP AB SCN SERPL QL: NEGATIVE
BUN SERPL-MCNC: 43 MG/DL (ref 8–23)
BUN/CREAT SERPL: 21.1 (ref 7–25)
CALCIUM SPEC-SCNC: 8.8 MG/DL (ref 8.6–10.5)
CHLORIDE SERPL-SCNC: 101 MMOL/L (ref 98–107)
CLARITY UR: CLEAR
CO2 SERPL-SCNC: 28 MMOL/L (ref 22–29)
COLOR UR: YELLOW
CREAT SERPL-MCNC: 2.04 MG/DL (ref 0.57–1)
CREAT UR-MCNC: 50.1 MG/DL
DEPRECATED RDW RBC AUTO: 42.5 FL (ref 37–54)
EGFRCR SERPLBLD CKD-EPI 2021: 25.7 ML/MIN/1.73
EOSINOPHIL # BLD AUTO: 0.45 10*3/MM3 (ref 0–0.4)
EOSINOPHIL NFR BLD AUTO: 8.4 % (ref 0.3–6.2)
ERYTHROCYTE [DISTWIDTH] IN BLOOD BY AUTOMATED COUNT: 12.3 % (ref 12.3–15.4)
GLOBULIN UR ELPH-MCNC: 3 GM/DL
GLUCOSE SERPL-MCNC: 93 MG/DL (ref 65–99)
GLUCOSE UR STRIP-MCNC: NEGATIVE MG/DL
HCT VFR BLD AUTO: 32.8 % (ref 34–46.6)
HGB BLD-MCNC: 10.6 G/DL (ref 12–15.9)
HGB UR QL STRIP.AUTO: ABNORMAL
HOLD SPECIMEN: NORMAL
HYALINE CASTS UR QL AUTO: ABNORMAL /LPF
IMM GRANULOCYTES # BLD AUTO: 0.01 10*3/MM3 (ref 0–0.05)
IMM GRANULOCYTES NFR BLD AUTO: 0.2 % (ref 0–0.5)
INR PPP: 1.04 (ref 0.8–1.2)
KETONES UR QL STRIP: NEGATIVE
LEUKOCYTE ESTERASE UR QL STRIP.AUTO: ABNORMAL
LYMPHOCYTES # BLD AUTO: 1.1 10*3/MM3 (ref 0.7–3.1)
LYMPHOCYTES NFR BLD AUTO: 20.5 % (ref 19.6–45.3)
Lab: NORMAL
MAGNESIUM SERPL-MCNC: 2.3 MG/DL (ref 1.6–2.4)
MCH RBC QN AUTO: 30.6 PG (ref 26.6–33)
MCHC RBC AUTO-ENTMCNC: 32.3 G/DL (ref 31.5–35.7)
MCV RBC AUTO: 94.8 FL (ref 79–97)
MONOCYTES # BLD AUTO: 0.62 10*3/MM3 (ref 0.1–0.9)
MONOCYTES NFR BLD AUTO: 11.5 % (ref 5–12)
NEUTROPHILS NFR BLD AUTO: 3.13 10*3/MM3 (ref 1.7–7)
NEUTROPHILS NFR BLD AUTO: 58.3 % (ref 42.7–76)
NITRITE UR QL STRIP: NEGATIVE
NRBC BLD AUTO-RTO: 0 /100 WBC (ref 0–0.2)
PH UR STRIP.AUTO: 5.5 [PH] (ref 5–9)
PLATELET # BLD AUTO: 159 10*3/MM3 (ref 140–450)
PMV BLD AUTO: 10.1 FL (ref 6–12)
POTASSIUM SERPL-SCNC: 3.1 MMOL/L (ref 3.5–5.2)
PROT SERPL-MCNC: 6.3 G/DL (ref 6–8.5)
PROT UR QL STRIP: NEGATIVE
PROTHROMBIN TIME: 13.6 SECONDS (ref 11.1–15.3)
QT INTERVAL: 404 MS
QTC INTERVAL: 472 MS
RBC # BLD AUTO: 3.46 10*6/MM3 (ref 3.77–5.28)
RBC # UR STRIP: ABNORMAL /HPF
REF LAB TEST METHOD: ABNORMAL
RH BLD: POSITIVE
SODIUM SERPL-SCNC: 137 MMOL/L (ref 136–145)
SODIUM UR-SCNC: 53 MMOL/L
SP GR UR STRIP: 1.03 (ref 1–1.03)
SQUAMOUS #/AREA URNS HPF: ABNORMAL /HPF
T&S EXPIRATION DATE: NORMAL
TROPONIN T SERPL HS-MCNC: 27 NG/L
UROBILINOGEN UR QL STRIP: ABNORMAL
WBC # UR STRIP: ABNORMAL /HPF
WBC NRBC COR # BLD: 5.37 10*3/MM3 (ref 3.4–10.8)
WHOLE BLOOD HOLD COAG: NORMAL
WHOLE BLOOD HOLD SPECIMEN: NORMAL

## 2023-07-28 PROCEDURE — 99222 1ST HOSP IP/OBS MODERATE 55: CPT

## 2023-07-28 PROCEDURE — 93306 TTE W/DOPPLER COMPLETE: CPT | Performed by: INTERNAL MEDICINE

## 2023-07-28 PROCEDURE — 4A03X5D MEASUREMENT OF ARTERIAL FLOW, INTRACRANIAL, EXTERNAL APPROACH: ICD-10-PCS | Performed by: STUDENT IN AN ORGANIZED HEALTH CARE EDUCATION/TRAINING PROGRAM

## 2023-07-28 PROCEDURE — 0042T HC CT CEREBRAL PERFUSION W/WO CONTRAST: CPT

## 2023-07-28 PROCEDURE — 71045 X-RAY EXAM CHEST 1 VIEW: CPT

## 2023-07-28 PROCEDURE — 25510000001 IOPAMIDOL PER 1 ML: Performed by: STUDENT IN AN ORGANIZED HEALTH CARE EDUCATION/TRAINING PROGRAM

## 2023-07-28 PROCEDURE — 99285 EMERGENCY DEPT VISIT HI MDM: CPT

## 2023-07-28 PROCEDURE — 80053 COMPREHEN METABOLIC PANEL: CPT | Performed by: NURSE PRACTITIONER

## 2023-07-28 PROCEDURE — 72128 CT CHEST SPINE W/O DYE: CPT

## 2023-07-28 PROCEDURE — 84300 ASSAY OF URINE SODIUM: CPT | Performed by: STUDENT IN AN ORGANIZED HEALTH CARE EDUCATION/TRAINING PROGRAM

## 2023-07-28 PROCEDURE — 86901 BLOOD TYPING SEROLOGIC RH(D): CPT | Performed by: NURSE PRACTITIONER

## 2023-07-28 PROCEDURE — 86900 BLOOD TYPING SEROLOGIC ABO: CPT | Performed by: NURSE PRACTITIONER

## 2023-07-28 PROCEDURE — 85025 COMPLETE CBC W/AUTO DIFF WBC: CPT | Performed by: NURSE PRACTITIONER

## 2023-07-28 PROCEDURE — 85730 THROMBOPLASTIN TIME PARTIAL: CPT | Performed by: NURSE PRACTITIONER

## 2023-07-28 PROCEDURE — 86850 RBC ANTIBODY SCREEN: CPT | Performed by: NURSE PRACTITIONER

## 2023-07-28 PROCEDURE — 63710000001 ONDANSETRON PER 8 MG: Performed by: STUDENT IN AN ORGANIZED HEALTH CARE EDUCATION/TRAINING PROGRAM

## 2023-07-28 PROCEDURE — 70498 CT ANGIOGRAPHY NECK: CPT

## 2023-07-28 PROCEDURE — 70496 CT ANGIOGRAPHY HEAD: CPT

## 2023-07-28 PROCEDURE — 92610 EVALUATE SWALLOWING FUNCTION: CPT | Performed by: SPEECH-LANGUAGE PATHOLOGIST

## 2023-07-28 PROCEDURE — 93306 TTE W/DOPPLER COMPLETE: CPT

## 2023-07-28 PROCEDURE — 83735 ASSAY OF MAGNESIUM: CPT | Performed by: STUDENT IN AN ORGANIZED HEALTH CARE EDUCATION/TRAINING PROGRAM

## 2023-07-28 PROCEDURE — 87086 URINE CULTURE/COLONY COUNT: CPT | Performed by: STUDENT IN AN ORGANIZED HEALTH CARE EDUCATION/TRAINING PROGRAM

## 2023-07-28 PROCEDURE — 25010000002 MORPHINE PER 10 MG: Performed by: NURSE PRACTITIONER

## 2023-07-28 PROCEDURE — 70551 MRI BRAIN STEM W/O DYE: CPT

## 2023-07-28 PROCEDURE — 81001 URINALYSIS AUTO W/SCOPE: CPT | Performed by: NURSE PRACTITIONER

## 2023-07-28 PROCEDURE — 72131 CT LUMBAR SPINE W/O DYE: CPT

## 2023-07-28 PROCEDURE — 70450 CT HEAD/BRAIN W/O DYE: CPT

## 2023-07-28 PROCEDURE — 85610 PROTHROMBIN TIME: CPT | Performed by: NURSE PRACTITIONER

## 2023-07-28 PROCEDURE — 25010000002 ONDANSETRON PER 1 MG: Performed by: NURSE PRACTITIONER

## 2023-07-28 PROCEDURE — 93005 ELECTROCARDIOGRAM TRACING: CPT | Performed by: NURSE PRACTITIONER

## 2023-07-28 PROCEDURE — 82570 ASSAY OF URINE CREATININE: CPT | Performed by: STUDENT IN AN ORGANIZED HEALTH CARE EDUCATION/TRAINING PROGRAM

## 2023-07-28 PROCEDURE — 84484 ASSAY OF TROPONIN QUANT: CPT | Performed by: NURSE PRACTITIONER

## 2023-07-28 RX ORDER — SODIUM CHLORIDE 9 MG/ML
40 INJECTION, SOLUTION INTRAVENOUS AS NEEDED
Status: DISCONTINUED | OUTPATIENT
Start: 2023-07-28 | End: 2023-07-29 | Stop reason: HOSPADM

## 2023-07-28 RX ORDER — CYCLOBENZAPRINE HCL 10 MG
10 TABLET ORAL 2 TIMES DAILY PRN
Status: DISCONTINUED | OUTPATIENT
Start: 2023-07-28 | End: 2023-07-29 | Stop reason: HOSPADM

## 2023-07-28 RX ORDER — ATORVASTATIN CALCIUM 40 MG/1
80 TABLET, FILM COATED ORAL NIGHTLY
Status: DISCONTINUED | OUTPATIENT
Start: 2023-07-28 | End: 2023-07-29 | Stop reason: HOSPADM

## 2023-07-28 RX ORDER — SODIUM CHLORIDE 0.9 % (FLUSH) 0.9 %
10 SYRINGE (ML) INJECTION AS NEEDED
Status: DISCONTINUED | OUTPATIENT
Start: 2023-07-28 | End: 2023-07-29 | Stop reason: HOSPADM

## 2023-07-28 RX ORDER — POTASSIUM CHLORIDE 750 MG/1
40 CAPSULE, EXTENDED RELEASE ORAL ONCE
Status: DISCONTINUED | OUTPATIENT
Start: 2023-07-28 | End: 2023-07-28

## 2023-07-28 RX ORDER — SODIUM CHLORIDE 0.9 % (FLUSH) 0.9 %
10 SYRINGE (ML) INJECTION EVERY 12 HOURS SCHEDULED
Status: DISCONTINUED | OUTPATIENT
Start: 2023-07-28 | End: 2023-07-29 | Stop reason: HOSPADM

## 2023-07-28 RX ORDER — BISACODYL 5 MG/1
5 TABLET, DELAYED RELEASE ORAL DAILY PRN
Status: DISCONTINUED | OUTPATIENT
Start: 2023-07-28 | End: 2023-07-29 | Stop reason: HOSPADM

## 2023-07-28 RX ORDER — BISACODYL 10 MG
10 SUPPOSITORY, RECTAL RECTAL DAILY PRN
Status: DISCONTINUED | OUTPATIENT
Start: 2023-07-28 | End: 2023-07-29 | Stop reason: HOSPADM

## 2023-07-28 RX ORDER — POTASSIUM CHLORIDE 750 MG/1
40 CAPSULE, EXTENDED RELEASE ORAL ONCE
Status: COMPLETED | OUTPATIENT
Start: 2023-07-28 | End: 2023-07-28

## 2023-07-28 RX ORDER — TRAMADOL HYDROCHLORIDE 50 MG/1
50 TABLET ORAL EVERY 8 HOURS PRN
Status: DISCONTINUED | OUTPATIENT
Start: 2023-07-28 | End: 2023-07-29 | Stop reason: HOSPADM

## 2023-07-28 RX ORDER — AMOXICILLIN 250 MG
2 CAPSULE ORAL 2 TIMES DAILY
Status: DISCONTINUED | OUTPATIENT
Start: 2023-07-28 | End: 2023-07-29 | Stop reason: HOSPADM

## 2023-07-28 RX ORDER — SODIUM CHLORIDE 9 MG/ML
100 INJECTION, SOLUTION INTRAVENOUS CONTINUOUS
Status: DISCONTINUED | OUTPATIENT
Start: 2023-07-28 | End: 2023-07-29 | Stop reason: HOSPADM

## 2023-07-28 RX ORDER — SODIUM CHLORIDE 0.9 % (FLUSH) 0.9 %
10 SYRINGE (ML) INJECTION EVERY 12 HOURS SCHEDULED
Status: DISCONTINUED | OUTPATIENT
Start: 2023-07-28 | End: 2023-07-28

## 2023-07-28 RX ORDER — HEPARIN SODIUM 5000 [USP'U]/ML
5000 INJECTION, SOLUTION INTRAVENOUS; SUBCUTANEOUS EVERY 12 HOURS SCHEDULED
Status: DISCONTINUED | OUTPATIENT
Start: 2023-07-28 | End: 2023-07-28

## 2023-07-28 RX ORDER — MORPHINE SULFATE 2 MG/ML
2 INJECTION, SOLUTION INTRAMUSCULAR; INTRAVENOUS ONCE
Status: COMPLETED | OUTPATIENT
Start: 2023-07-28 | End: 2023-07-28

## 2023-07-28 RX ORDER — ASPIRIN 81 MG/1
81 TABLET, CHEWABLE ORAL DAILY
Status: DISCONTINUED | OUTPATIENT
Start: 2023-07-28 | End: 2023-07-29 | Stop reason: HOSPADM

## 2023-07-28 RX ORDER — PANTOPRAZOLE SODIUM 40 MG/1
40 TABLET, DELAYED RELEASE ORAL
Status: DISCONTINUED | OUTPATIENT
Start: 2023-07-29 | End: 2023-07-29 | Stop reason: HOSPADM

## 2023-07-28 RX ORDER — CLOPIDOGREL BISULFATE 75 MG/1
75 TABLET ORAL DAILY
Status: DISCONTINUED | OUTPATIENT
Start: 2023-07-29 | End: 2023-07-29 | Stop reason: HOSPADM

## 2023-07-28 RX ORDER — ONDANSETRON 4 MG/1
4 TABLET, FILM COATED ORAL EVERY 6 HOURS PRN
Status: DISCONTINUED | OUTPATIENT
Start: 2023-07-28 | End: 2023-07-29 | Stop reason: HOSPADM

## 2023-07-28 RX ORDER — ONDANSETRON 2 MG/ML
4 INJECTION INTRAMUSCULAR; INTRAVENOUS ONCE
Status: COMPLETED | OUTPATIENT
Start: 2023-07-28 | End: 2023-07-28

## 2023-07-28 RX ORDER — ASPIRIN 300 MG/1
300 SUPPOSITORY RECTAL DAILY
Status: DISCONTINUED | OUTPATIENT
Start: 2023-07-28 | End: 2023-07-29 | Stop reason: HOSPADM

## 2023-07-28 RX ORDER — CLOPIDOGREL BISULFATE 75 MG/1
300 TABLET ORAL ONCE
Status: COMPLETED | OUTPATIENT
Start: 2023-07-28 | End: 2023-07-28

## 2023-07-28 RX ORDER — ATENOLOL 25 MG/1
25 TABLET ORAL 2 TIMES DAILY
Status: DISCONTINUED | OUTPATIENT
Start: 2023-07-28 | End: 2023-07-29 | Stop reason: HOSPADM

## 2023-07-28 RX ORDER — POLYETHYLENE GLYCOL 3350 17 G/17G
17 POWDER, FOR SOLUTION ORAL DAILY PRN
Status: DISCONTINUED | OUTPATIENT
Start: 2023-07-28 | End: 2023-07-29 | Stop reason: HOSPADM

## 2023-07-28 RX ORDER — FOLIC ACID 1 MG/1
1 TABLET ORAL DAILY
Status: DISCONTINUED | OUTPATIENT
Start: 2023-07-29 | End: 2023-07-29 | Stop reason: HOSPADM

## 2023-07-28 RX ORDER — SODIUM CHLORIDE 9 MG/ML
40 INJECTION, SOLUTION INTRAVENOUS AS NEEDED
Status: DISCONTINUED | OUTPATIENT
Start: 2023-07-28 | End: 2023-07-28

## 2023-07-28 RX ORDER — CALCITONIN SALMON 200 [IU]/.09ML
1 SPRAY, METERED NASAL DAILY
Status: DISCONTINUED | OUTPATIENT
Start: 2023-07-28 | End: 2023-07-28

## 2023-07-28 RX ORDER — OXYCODONE AND ACETAMINOPHEN 10; 325 MG/1; MG/1
1 TABLET ORAL DAILY PRN
Status: DISCONTINUED | OUTPATIENT
Start: 2023-07-29 | End: 2023-07-29 | Stop reason: HOSPADM

## 2023-07-28 RX ORDER — TRAZODONE HYDROCHLORIDE 150 MG/1
150 TABLET ORAL NIGHTLY
Status: DISCONTINUED | OUTPATIENT
Start: 2023-07-28 | End: 2023-07-29 | Stop reason: HOSPADM

## 2023-07-28 RX ORDER — SODIUM CHLORIDE 0.9 % (FLUSH) 0.9 %
10 SYRINGE (ML) INJECTION AS NEEDED
Status: DISCONTINUED | OUTPATIENT
Start: 2023-07-28 | End: 2023-07-28

## 2023-07-28 RX ORDER — LANOLIN ALCOHOL/MO/W.PET/CERES
1000 CREAM (GRAM) TOPICAL DAILY
Status: DISCONTINUED | OUTPATIENT
Start: 2023-07-28 | End: 2023-07-29 | Stop reason: HOSPADM

## 2023-07-28 RX ADMIN — ASPIRIN 81 MG: 81 TABLET, CHEWABLE ORAL at 13:25

## 2023-07-28 RX ADMIN — IOPAMIDOL 50 ML: 755 INJECTION, SOLUTION INTRAVENOUS at 11:05

## 2023-07-28 RX ADMIN — CYANOCOBALAMIN TAB 1000 MCG 1000 MCG: 1000 TAB at 14:33

## 2023-07-28 RX ADMIN — ATORVASTATIN CALCIUM 80 MG: 40 TABLET, FILM COATED ORAL at 21:06

## 2023-07-28 RX ADMIN — CLOPIDOGREL BISULFATE 300 MG: 75 TABLET ORAL at 13:25

## 2023-07-28 RX ADMIN — IOPAMIDOL 90 ML: 755 INJECTION, SOLUTION INTRAVENOUS at 11:12

## 2023-07-28 RX ADMIN — ATENOLOL 25 MG: 25 TABLET ORAL at 21:08

## 2023-07-28 RX ADMIN — Medication 10 ML: at 12:50

## 2023-07-28 RX ADMIN — SODIUM CHLORIDE 100 ML/HR: 9 INJECTION, SOLUTION INTRAVENOUS at 14:33

## 2023-07-28 RX ADMIN — TRAZODONE HYDROCHLORIDE 150 MG: 150 TABLET ORAL at 21:06

## 2023-07-28 RX ADMIN — MORPHINE SULFATE 2 MG: 2 INJECTION, SOLUTION INTRAMUSCULAR; INTRAVENOUS at 12:04

## 2023-07-28 RX ADMIN — CYCLOBENZAPRINE HYDROCHLORIDE 10 MG: 10 TABLET, FILM COATED ORAL at 14:33

## 2023-07-28 RX ADMIN — POTASSIUM CHLORIDE 40 MEQ: 750 CAPSULE, EXTENDED RELEASE ORAL at 16:56

## 2023-07-28 RX ADMIN — ONDANSETRON 4 MG: 2 INJECTION INTRAMUSCULAR; INTRAVENOUS at 12:04

## 2023-07-28 RX ADMIN — POTASSIUM CHLORIDE 40 MEQ: 750 CAPSULE, EXTENDED RELEASE ORAL at 21:05

## 2023-07-28 RX ADMIN — SODIUM CHLORIDE 1000 ML: 9 INJECTION, SOLUTION INTRAVENOUS at 12:04

## 2023-07-28 RX ADMIN — ONDANSETRON 4 MG: 4 TABLET, FILM COATED ORAL at 21:31

## 2023-07-28 RX ADMIN — TRAMADOL HYDROCHLORIDE 50 MG: 50 TABLET ORAL at 14:33

## 2023-07-28 NOTE — H&P
HISTORY AND PHYSICAL  NAME: Krystina Duong  : 1950  MRN: [id number]    DATE OF ADMISSION:  2023     DATE & TIME SEEN: 23 at 1210    PCP: Mack Cameron MD    CODE STATUS: DNR and DNI    CHIEF COMPLAINT:  weakness     HPI:  Krystina Duong is a 73 y.o. female with a CMH of HTN, fibromyalgia, compression fracture, and HLD who presents complaining of right hand weakness. Yesterday patient states she fell in her home, she said she felt dizzy and remembers waking up on her kitchen floor. She states today she does not feel dizzy but feels weak. She has had difficulty with her gait today and notices she is stumbling and she is frequently dropping things out of her right hand. She does endorse that her back hurts during the exam and she believes the symptoms are related to her back pain and her previous fracture.     ED course: CT scan of head was obtained with no acute abnormalities, CT perfusion studies show no stenosis. Neurology consulted in ED, given loading dose of plavix and aspirin. MRI is scheduled for stroke r/o. Cr elevated to 2.04, K at 3.1. CBC shows Hb at 10.6. 1L bolus given for CALISTA likely worsened by CT contrast.     CONCURRENT MEDICAL HISTORY:  Past Medical History:   Diagnosis Date    Anemia     History of transfusion     Hypertension        PAST SURGICAL HISTORY:  Past Surgical History:   Procedure Laterality Date    CHOLECYSTECTOMY      COLONOSCOPY N/A 1/10/2018    Procedure: COLONOSCOPY;  Surgeon: Elton Santiago DO;  Location: [facility] ENDOSCOPY;  Service:     ENDOSCOPY N/A 1/10/2018    Procedure: ESOPHAGOGASTRODUODENOSCOPY;  Surgeon: Elton Santiago DO;  Location: [facility] ENDOSCOPY;  Service:     ENDOSCOPY N/A 2018    Procedure: ESOPHAGOGASTRODUODENOSCOPY;  Surgeon: Elton Santiago DO;  Location: [facility] ENDOSCOPY;  Service:     ENDOSCOPY N/A 2018    Procedure: ESOPHAGOGASTRODUODENOSCOPY;  Surgeon: Elton Santiago DO;  Location: [facility] ENDOSCOPY;  Service:  Gastroenterology    ENDOSCOPY N/A 8/15/2018    Procedure: ESOPHAGOGASTRODUODENOSCOPY;  Surgeon: Elton Santiago DO;  Location: [facility] ENDOSCOPY;  Service: Gastroenterology    FRACTURE SURGERY      ankle    HYSTERECTOMY      LAPAROSCOPIC CHOLECYSTECTOMY         FAMILY HISTORY:  History reviewed. No pertinent family history.     SOCIAL HISTORY:  Social History     Socioeconomic History    Marital status:    Tobacco Use    Smoking status: Never    Smokeless tobacco: Never   Substance and Sexual Activity    Alcohol use: No    Drug use: No    Sexual activity: Defer       HOME MEDICATIONS:  Prior to Admission medications    Medication Sig Start Date End Date Taking? Authorizing Provider   atenolol (TENORMIN) 25 MG tablet Take 1 tablet by mouth 2 (Two) Times a Day.    Rj Eubanks MD   cyclobenzaprine (FLEXERIL) 10 MG tablet Take 1 tablet by mouth 2 (Two) Times a Day.    Rj Eubanks MD   DULoxetine (CYMBALTA) 60 MG capsule Take 1 capsule by mouth Daily.    Rj Eubanks MD   folic acid (FOLVITE) 1 MG tablet Take 1 tablet by mouth Daily. 1/31/20   Janet Cline APRN   oxyCODONE-acetaminophen (PERCOCET) 7.5-325 MG per tablet Take 1 tablet by mouth Daily As Needed.    Rj Eubanks MD   traMADol (ULTRAM) 50 MG tablet Take 1 tablet by mouth Every 8 (Eight) Hours. 6/4/15   Rj Eubanks MD   traZODone (DESYREL) 150 MG tablet Take 1 tablet by mouth Every Night.    Rj Eubanks MD   vitamin B-12 (CYANOCOBALAMIN) 1000 MCG tablet Take 1 tablet by mouth Daily. 1/31/20   Janet Cline APRN       ALLERGIES:  Diphenhydramine hcl, Metoclopramide, and Promethazine    REVIEW OF SYSTEMS  Review of Systems   Constitutional:  Negative for activity change, appetite change, fatigue and fever.   HENT:  Negative for congestion and rhinorrhea.    Eyes:  Negative for photophobia and visual disturbance.   Respiratory:  Negative for cough, shortness of breath and wheezing.     Cardiovascular:  Positive for leg swelling. Negative for chest pain.   Gastrointestinal:  Negative for abdominal pain, constipation, diarrhea, nausea and vomiting.   Genitourinary:  Negative for difficulty urinating, dysuria and hematuria.   Musculoskeletal:  Positive for arthralgias, back pain and gait problem. Negative for myalgias.   Skin: Negative.    Neurological:  Positive for dizziness, syncope, weakness and light-headedness. Negative for seizures, facial asymmetry, speech difficulty, numbness and headaches.   Psychiatric/Behavioral:  Negative for dysphoric mood. The patient is not nervous/anxious.      PHYSICAL EXAM:  Temp:  [97.9 °F (36.6 °C)] 97.9 °F (36.6 °C)  Heart Rate:  [81-88] 88  Resp:  [20] 20  BP: (116-139)/(77-98) 139/98  Body mass index is 23.3 kg/m².  Physical Exam  Constitutional:       General: She is not in acute distress.     Appearance: Normal appearance.   HENT:      Head: Normocephalic.   Eyes:      Extraocular Movements: Extraocular movements intact.      Conjunctiva/sclera: Conjunctivae normal.      Pupils: Pupils are equal, round, and reactive to light.   Cardiovascular:      Rate and Rhythm: Normal rate and regular rhythm.      Pulses: Normal pulses.      Heart sounds: Normal heart sounds.   Pulmonary:      Effort: Pulmonary effort is normal.   Abdominal:      General: Abdomen is flat. Bowel sounds are normal.      Palpations: Abdomen is soft.      Tenderness: There is no abdominal tenderness. There is no guarding.   Musculoskeletal:         General: Normal range of motion.      Cervical back: Normal range of motion.      Right lower leg: Edema present.      Left lower leg: Edema present.      Comments: Trace edema bilaterally   Skin:     General: Skin is warm.      Capillary Refill: Capillary refill takes less than 2 seconds.   Neurological:      General: No focal deficit present.      Mental Status: She is alert and oriented to person, place, and time.      Cranial Nerves: No  cranial nerve deficit.      Sensory: No sensory deficit.      Motor: No weakness.      Coordination: Coordination normal.      Gait: Gait abnormal.      Deep Tendon Reflexes: Reflexes normal.      Comments: Stumbling when ambulating   Psychiatric:         Mood and Affect: Mood normal.         Behavior: Behavior normal.         Thought Content: Thought content normal.         Judgment: Judgment normal.       DIAGNOSTIC DATA:   Lab Results (last 24 hours)       Procedure Component Value Units Date/Time    Urinalysis, Microscopic Only - Urine, Clean Catch [[id number]] Collected: 07/28/23 1155    Specimen: Urine, Clean Catch Updated: 07/28/23 1223    Urinalysis With Microscopic If Indicated (No Culture) - Urine, Clean Catch [[id number]] Collected: 07/28/23 1155    Specimen: Urine, Clean Catch Updated: 07/28/23 1207    Gore Draw [[id number]] Collected: 07/28/23 1055    Specimen: Blood Updated: 07/28/23 1201    Narrative:      The following orders were created for panel order Gore Draw.  Procedure                               Abnormality         Status                     ---------                               -----------         ------                     Green Top (Gel)[[id number]]                                  Final result               Lavender Top[[id number]]                                     Final result               Gold Top - SST[[id number]]                                   Final result               Light Blue Top[[id number]]                                   Final result                 Please view results for these tests on the individual orders.    Light Blue Top [[id number]] Collected: 07/28/23 1055    Specimen: Blood Updated: 07/28/23 1201     Extra Tube Hold for add-ons.     Comment: Auto resulted       Lavender Top [[id number]] Collected: 07/28/23 1055    Specimen: Blood Updated: 07/28/23 1201     Extra Tube hold for add-on     Comment: Auto resulted       Gold Top - SST [[id number]] Collected:  07/28/23 1055    Specimen: Blood Updated: 07/28/23 1201     Extra Tube Hold for add-ons.     Comment: Auto resulted.       Green Top (Gel) [[id number]] Collected: 07/28/23 1055    Specimen: Blood Updated: 07/28/23 1201     Extra Tube Hold for add-ons.     Comment: Auto resulted.       aPTT [[id number]]  (Normal) Collected: 07/28/23 1055    Specimen: Blood Updated: 07/28/23 1133     PTT 29.4 seconds     Narrative:      The recommended Heparin therapeutic range is 68-97 seconds.    Protime-INR [[id number]]  (Normal) Collected: 07/28/23 1055    Specimen: Blood Updated: 07/28/23 1133     Protime 13.6 Seconds      INR 1.04    Narrative:      Therapeutic range for most indications is 2.0-3.0 INR,  or 2.5-3.5 for mechanical heart valves.    Comprehensive Metabolic Panel [[id number]]  (Abnormal) Collected: 07/28/23 1055    Specimen: Blood Updated: 07/28/23 1131     Glucose 93 mg/dL      BUN 43 mg/dL      Creatinine 2.04 mg/dL      Sodium 137 mmol/L      Potassium 3.1 mmol/L      Chloride 101 mmol/L      CO2 28.0 mmol/L      Calcium 8.8 mg/dL      Total Protein 6.3 g/dL      Albumin 3.3 g/dL      ALT (SGPT) 15 U/L      AST (SGOT) 22 U/L      Alkaline Phosphatase 60 U/L      Total Bilirubin 0.2 mg/dL      Globulin 3.0 gm/dL      A/G Ratio 1.1 g/dL      BUN/Creatinine Ratio 21.1     Anion Gap 8.0 mmol/L      eGFR 25.7 mL/min/1.73     Narrative:      GFR Normal >60  Chronic Kidney Disease <60  Kidney Failure <15    The GFR formula is only valid for adults with stable renal function between ages 18 and 70.    Single High Sensitivity Troponin T [[id number]]  (Abnormal) Collected: 07/28/23 1055    Specimen: Blood Updated: 07/28/23 1129     HS Troponin T 27 ng/L     Narrative:      High Sensitive Troponin T Reference Range:  <10.0 ng/L- Negative Female for AMI  <15.0 ng/L- Negative Male for AMI  >=10 - Abnormal Female indicating possible myocardial injury.  >=15 - Abnormal Male indicating possible myocardial injury.   Clinicians would  have to utilize clinical acumen, EKG, Troponin, and serial changes to determine if it is an Acute Myocardial Infarction or myocardial injury due to an underlying chronic condition.         CBC & Differential [[id number]]  (Abnormal) Collected: 07/28/23 1055    Specimen: Blood Updated: 07/28/23 1106    Narrative:      The following orders were created for panel order CBC & Differential.  Procedure                               Abnormality         Status                     ---------                               -----------         ------                     CBC Auto Differential[[id number]]        Abnormal            Final result                 Please view results for these tests on the individual orders.    CBC Auto Differential [[id number]]  (Abnormal) Collected: 07/28/23 1055    Specimen: Blood Updated: 07/28/23 1106     WBC 5.37 10*3/mm3      RBC 3.46 10*6/mm3      Hemoglobin 10.6 g/dL      Hematocrit 32.8 %      MCV 94.8 fL      MCH 30.6 pg      MCHC 32.3 g/dL      RDW 12.3 %      RDW-SD 42.5 fl      MPV 10.1 fL      Platelets 159 10*3/mm3      Neutrophil % 58.3 %      Lymphocyte % 20.5 %      Monocyte % 11.5 %      Eosinophil % 8.4 %      Basophil % 1.1 %      Immature Grans % 0.2 %      Neutrophils, Absolute 3.13 10*3/mm3      Lymphocytes, Absolute 1.10 10*3/mm3      Monocytes, Absolute 0.62 10*3/mm3      Eosinophils, Absolute 0.45 10*3/mm3      Basophils, Absolute 0.06 10*3/mm3      Immature Grans, Absolute 0.01 10*3/mm3      nRBC 0.0 /100 WBC     Extra Tubes [[id number]] Collected: 07/28/23 1102    Specimen: Blood, Venous Line Updated: 07/28/23 1103    Narrative:      The following orders were created for panel order Extra Tubes.  Procedure                               Abnormality         Status                     ---------                               -----------         ------                     Mccartney Top[[id number]]                                         In process                   Please view results  for these tests on the individual orders.    Gray Top [[id number]] Collected: 07/28/23 1102    Specimen: Blood Updated: 07/28/23 1103             Imaging Results (Last 24 Hours)       Procedure Component Value Units Date/Time    CT Head Without Contrast Stroke Protocol [[id number]] Collected: 07/28/23 1105     Updated: 07/28/23 1158    Narrative:      Noncontrast CT head    COMPARISON:  No comparison.    HISTORY:  Neurological deficit, acute stroke suspected.    TECHNIQUE:  Axial images were performed from the calvarium through the base of the skull  followed by 2D multiplanar reformats.  Stroke protocol was used for this  procedure.    FINDINGS:  No evidence of stroke, hemorrhage, edema or mass.  No midline shift.  Brain stem  is normal.  Included portions of the orbits are normal.  Included paranasal  sinuses are normal.  Mastoid air cells bilaterally are normal.      Impression:      No acute intracranial process.      XR Chest 1 View [[id number]] Collected: 07/28/23 1123     Updated: 07/28/23 1136    Narrative:      FINDINGS:  Cardiomegaly. No acute infiltrates or consolidations. No pleural effusions.  There is a probable hiatal hernia.    CT CEREBRAL PERFUSION WITH & WITHOUT CONTRAST [[id number]] Collected: 07/28/23 1135     Updated: 07/28/23 1135    Narrative:      INDICATION:  Neuro deficit, acute, stroke suspected. Concern for stroke.    TECHNIQUE:  Axial images of the head were obtained after the administration of intravenous  contrast.  CT perfusion maps were obtained.  Quantitative data were obtained  regarding cerebral blood flow according to various parameters.  Rapid software  was used for further evaluation of this study.    FINDINGS:  CBF less than 30%: 0 mL  T-Max greater than 6 seconds: 0 mL  Mismatch volume: 0 mL  Mismatch ratio: None        CT Angiogram Head w AI Analysis of LVO [[id number]] Collected: 07/28/23 1132     Updated: 07/28/23 1134    Narrative:      INDICATION:  Stroke, follow upNeuro  deficit, acute stroke suspectedAcute Stroke.    COMPARISON:  None relevant.    TECHNIQUE:  Helical CT of the head and neck was performed with intravenous contrast in the  arterial phase of contrast enhancement.  Multiplanar and 3-D MIP reformations  were provided and performed on an independent workstation.  Additional axial  images of the head postinfusion of intravenous contrast were performed.    Measurements are based on NASCET criteria, calculated from the ratio of the  linear luminal diameter of the narrowest segment of the diseased portion of the  artery to the diameter of the artery beyond any poststenotic dilatation.    Stenoses are graded as follows: Mild = <50%; moderate = 50-69%; severe = 70-99%    FINDINGS:    CTA NECK:  No occlusion, flow-limiting stenosis, aneurysm, or dissection.    CTA HEAD:  No occlusion, flow-limiting stenosis, aneurysm, or dissection.    CT HEAD:  No abnormal enhancement.    INCIDENTALS:  None significant.      Impression:      No occlusion or flow-limiting stenosis.          CT Angiogram Neck [[id number]] Collected: 07/28/23 1132     Updated: 07/28/23 1134    Narrative:      INDICATION:  Stroke, follow upNeuro deficit, acute stroke suspectedAcute Stroke.    COMPARISON:  None relevant.    TECHNIQUE:  Helical CT of the head and neck was performed with intravenous contrast in the  arterial phase of contrast enhancement.  Multiplanar and 3-D MIP reformations  were provided and performed on an independent workstation.  Additional axial  images of the head postinfusion of intravenous contrast were performed.    Measurements are based on NASCET criteria, calculated from the ratio of the  linear luminal diameter of the narrowest segment of the diseased portion of the  artery to the diameter of the artery beyond any poststenotic dilatation.    Stenoses are graded as follows: Mild = <50%; moderate = 50-69%; severe = 70-99%    FINDINGS:    CTA NECK:  No occlusion, flow-limiting stenosis,  aneurysm, or dissection.    CTA HEAD:  No occlusion, flow-limiting stenosis, aneurysm, or dissection.    CT HEAD:  No abnormal enhancement.    INCIDENTALS:  None significant.      Impression:      No occlusion or flow-limiting stenosis.                    I reviewed the patient's new clinical results.    ASSESSMENT AND PLAN: This is a 73 y.o. female with:    Active Hospital Problems    Diagnosis  POA    **CALISTA (acute kidney injury) [N17.9]  Unknown    Stroke [I63.9]  Unknown    Hypokalemia [E87.6]  Unknown    Iron deficiency anemia secondary to inadequate dietary iron intake [D50.8]  Yes     73 yr old female admitted for stroke r/o due to gait abnormality and right hand weakness. CT head and CT perfusion studies negative for acute findings. Neurology has been consulted and will follow patient. MRI has been scheduled.     Stroke   - neurology consulted   - CT head shows no acute abnormalities   - CT perfusion studies show no stenosis   - MRI scheduled   - UDS pending   - aspirin loading dose given 300mg, continue with aspirin 81mg   - Lipitor 80mg   - Plavix 300mg given, continue with 75mg daily   - NPO until cleared with bedside swallow eval  - Neuro checks Q1hr for 10 hours then Q2  - NIHSS scale per shift   - MRI of lumbar and thoracic spine to rule out fracture causing gait abnormality    CALISTA   - Cr 2.04, baseline 0.8-0.9  - arrived with CALISTA, CT contrast studies may have worsened, will hold any contrast studies unless necessary   - Hold all nephrotoxic agents   - Urine studies ordered; urine Na, urine Cr  - 1L bolus given in ED   - Maintenance NS at 100ml/hr  - Obtaining Echo for EF, will alter maintenance fluids as needed  - Monitor CMP     Hypokalemia   - K+ 3.1   - Potassium replacement protocol in place   - Continue to monitor with CMP       Anemia due to B12 deficiency   - Continue home B12 and folic acid supplements     DVT prophylaxis: SCD's in place     Krystina Duong and I have discussed pain goals for  this hospitalization after reviewing her current clinical condition, medical history and prior pain experiences.  The goal is to keep the pain level controlled.  To help achieve this, I plan to order .    PDMP reviewed and consistent with patient reported medications.    Expected Length of Stay: Where: home and When:  1-2 days    I discussed the patient's findings and my recommendations with patient and family.     Keith Martinez MD  is the attending on record at time of admission, He is aware of the patient's status and agrees with the above history and physical.        This document has been electronically signed by Pavithra Tello MD on July 28, 2023 12:44 CDT

## 2023-07-28 NOTE — Clinical Note
Level of Care: Stepdown [25]   Diagnosis: Syncope [206001]   Admitting Physician: NEDRA RIZVI [[id number]]   Attending Physician: NEDRA RIZVI [[id number]]

## 2023-07-28 NOTE — PLAN OF CARE
Goal Outcome Evaluation:  Plan of Care Reviewed With: patient, spouse        Progress: improving  Outcome Evaluation: ST: bedside swallow evaluation completd this date.  pt has no deficits or evidence of dysphagia.  screen for aphasia, dysarthria, anomia wfl.  pt and spouse states there are no memory concerns.

## 2023-07-28 NOTE — ACP (ADVANCE CARE PLANNING)
I discussed risks and benefits of code status with patient. In the event breathing or heart is to stop, she wishes to be do not resuscitate. Patient interventions: No CPR or Intubation . In the event she cannot make medical decisions, she has been designated her  Anthony as healthcare surrogate. She does not have advanced directive on file.    Anthony Ad: [phone]          This document has been electronically signed by Pavithra Tello MD on July 28, 2023 12:53 CDT

## 2023-07-28 NOTE — ED NOTES
"Nursing report ED to floor  Krystina Duong  73 y.o.  female    HPI:   Chief Complaint   Patient presents with    Syncope       Admitting doctor:   No admitting provider for patient encounter.    Consulting provider(s):  Consults       Date and Time Order Name Status Description    7/28/2023 10:44 AM Inpatient Neurology Consult Stroke      7/28/2023 10:44 AM Inpatient Neurology Consult Stroke               Admitting diagnosis:   The primary encounter diagnosis was Syncope, unspecified syncope type. Diagnoses of CALISTA (acute kidney injury), Gait abnormality, and Acute midline thoracic back pain were also pertinent to this visit.    Code status:   Current Code Status       Date Active Code Status Order ID Comments User Context       Not on file            Allergies:   Diphenhydramine hcl, Metoclopramide, and Promethazine    Intake and Output  No intake or output data in the 24 hours ending 07/28/23 1243    Weight:       07/28/23  1012   Weight: 62.6 kg (137 lb 14.4 oz)       Most recent vitals:   Vitals:    07/28/23 1012 07/28/23 1205   BP: 116/77 139/98   BP Location: Right arm    Patient Position: Sitting    Pulse: 81 88   Resp: 20 20   Temp: 97.9 °F (36.6 °C)    TempSrc: Oral    SpO2: 96% 99%   Weight: 62.6 kg (137 lb 14.4 oz)    Height: 163.8 cm (64.5\")      Oxygen Therapy: RA    Active LDAs/IV Access:   Lines, Drains & Airways       Active LDAs       Name Placement date Placement time Site Days    Peripheral IV 07/28/23 1056 Left;Posterior Forearm 07/28/23  1056  Forearm  less than 1                    Labs (abnormal labs have a star):   Labs Reviewed   COMPREHENSIVE METABOLIC PANEL - Abnormal; Notable for the following components:       Result Value    BUN 43 (*)     Creatinine 2.04 (*)     Potassium 3.1 (*)     Albumin 3.3 (*)     eGFR 25.7 (*)     All other components within normal limits    Narrative:     GFR Normal >60  Chronic Kidney Disease <60  Kidney Failure <15    The GFR formula is only valid for " adults with stable renal function between ages 18 and 70.   SINGLE HSTROPONIN T - Abnormal; Notable for the following components:    HS Troponin T 27 (*)     All other components within normal limits    Narrative:     High Sensitive Troponin T Reference Range:  <10.0 ng/L- Negative Female for AMI  <15.0 ng/L- Negative Male for AMI  >=10 - Abnormal Female indicating possible myocardial injury.  >=15 - Abnormal Male indicating possible myocardial injury.   Clinicians would have to utilize clinical acumen, EKG, Troponin, and serial changes to determine if it is an Acute Myocardial Infarction or myocardial injury due to an underlying chronic condition.        CBC WITH AUTO DIFFERENTIAL - Abnormal; Notable for the following components:    RBC 3.46 (*)     Hemoglobin 10.6 (*)     Hematocrit 32.8 (*)     Eosinophil % 8.4 (*)     Eosinophils, Absolute 0.45 (*)     All other components within normal limits   PROTIME-INR - Normal    Narrative:     Therapeutic range for most indications is 2.0-3.0 INR,  or 2.5-3.5 for mechanical heart valves.   APTT - Normal    Narrative:     The recommended Heparin therapeutic range is 68-97 seconds.   RAINBOW DRAW    Narrative:     The following orders were created for panel order [place] Draw.  Procedure                               Abnormality         Status                     ---------                               -----------         ------                     Green Top (Gel)[[id number]]                                  Final result               Lavender Top[[id number]]                                     Final result               Gold Top - SST[[id number]]                                   Final result               Light Blue Top[[id number]]                                   Final result                 Please view results for these tests on the individual orders.   URINALYSIS W/ MICROSCOPIC IF INDICATED (NO CULTURE)   URINALYSIS, MICROSCOPIC ONLY   URINE DRUG SCREEN   MAGNESIUM   POCT  OCCULT BLOOD STOOL   POCT GLUCOSE FINGERSTICK   POCT GLUCOSE FINGERSTICK   POCT GLUCOSE FINGERSTICK   POCT GLUCOSE FINGERSTICK   TYPE AND SCREEN   PREVIOUS HISTORY   CBC AND DIFFERENTIAL    Narrative:     The following orders were created for panel order CBC & Differential.  Procedure                               Abnormality         Status                     ---------                               -----------         ------                     CBC Auto Differential[[id number]]        Abnormal            Final result                 Please view results for these tests on the individual orders.   GREEN TOP   LAVENDER TOP   GOLD TOP - SST   LIGHT BLUE TOP   EXTRA TUBES    Narrative:     The following orders were created for panel order Extra Tubes.  Procedure                               Abnormality         Status                     ---------                               -----------         ------                     [place] Top[[id number]]                                         In process                   Please view results for these tests on the individual orders.   GRAY TOP       Meds given in ED:   Medications   sodium chloride 0.9 % flush 10 mL (has no administration in time range)   sodium chloride 0.9 % flush 10 mL (has no administration in time range)   sodium chloride 0.9 % flush 10 mL (has no administration in time range)   sodium chloride 0.9 % infusion 40 mL (has no administration in time range)   atorvastatin (LIPITOR) tablet 80 mg (has no administration in time range)   aspirin chewable tablet 81 mg (has no administration in time range)     Or   aspirin suppository 300 mg (has no administration in time range)   clopidogrel (PLAVIX) tablet 300 mg (has no administration in time range)     And   clopidogrel (PLAVIX) tablet 75 mg (has no administration in time range)   Potassium Replacement - Follow Nurse / BPA Driven Protocol (has no administration in time range)   Magnesium Standard Dose Replacement -  Follow Nurse / BPA Driven Protocol (has no administration in time range)   Phosphorus Replacement - Follow Nurse / BPA Driven Protocol (has no administration in time range)   Calcium Replacement - Follow Nurse / BPA Driven Protocol (has no administration in time range)   iopamidol (ISOVUE-370) 76 % injection 50 mL (50 mL Intravenous Given 7/28/23 1105)   iopamidol (ISOVUE-370) 76 % injection 100 mL (90 mL Intravenous Given 7/28/23 1112)   sodium chloride 0.9 % bolus 1,000 mL (1,000 mL Intravenous New Bag 7/28/23 1204)   morphine injection 2 mg (2 mg Intravenous Given 7/28/23 1204)   ondansetron (ZOFRAN) injection 4 mg (4 mg Intravenous Given 7/28/23 1204)           NIH Stroke Scale:       Isolation/Infection(s):  No active isolations   No active infections     COVID Testing  Collected na  Resulted na    Nursing report ED to floor:  Mental status: A+O  Ambulatory status: independent  Precautions: na    ED nurse phone extentsion- [phone]

## 2023-07-28 NOTE — THERAPY EVALUATION
Acute Care - Speech Language Pathology   Swallow Initial Evaluation [facility]     Patient Name: Krystina Duong  : 1950  MRN: [id number]  Today's Date: 2023               Admit Date: 2023    Visit Dx:     ICD-10-CM ICD-9-CM   1. Syncope, unspecified syncope type  R55 780.2   2. CALISTA (acute kidney injury)  N17.9 584.9   3. Gait abnormality  R26.9 781.2   4. Acute midline thoracic back pain  M54.6 724.1     Patient Active Problem List   Diagnosis    Hypertension, essential, benign    Back pain due to inflammatory process    Anxiety state    Closed compression fracture of thoracic vertebra    Degeneration of lumbar intervertebral disc    Depressive disorder    Fibromyalgia    Hypercholesterolemia    Restless legs syndrome (RLS)    Senile osteoporosis    Tear of meniscus of left knee, subsequent encounter    Stroke    CALISTA (acute kidney injury)    Hypokalemia    Anemia due to vitamin B12 deficiency    Syncope     Past Medical History:   Diagnosis Date    Anemia     History of transfusion     Hypertension      Past Surgical History:   Procedure Laterality Date    CHOLECYSTECTOMY      COLONOSCOPY N/A 1/10/2018    Procedure: COLONOSCOPY;  Surgeon: Elton Santiago DO;  Location: [facility] ENDOSCOPY;  Service:     ENDOSCOPY N/A 1/10/2018    Procedure: ESOPHAGOGASTRODUODENOSCOPY;  Surgeon: Elton Santiago DO;  Location: [facility] ENDOSCOPY;  Service:     ENDOSCOPY N/A 2018    Procedure: ESOPHAGOGASTRODUODENOSCOPY;  Surgeon: Elton Santiago DO;  Location: [facility] ENDOSCOPY;  Service:     ENDOSCOPY N/A 2018    Procedure: ESOPHAGOGASTRODUODENOSCOPY;  Surgeon: Elton Santiago DO;  Location: [facility] ENDOSCOPY;  Service: Gastroenterology    ENDOSCOPY N/A 8/15/2018    Procedure: ESOPHAGOGASTRODUODENOSCOPY;  Surgeon: Elton Santiago DO;  Location: [facility] ENDOSCOPY;  Service: Gastroenterology    FRACTURE SURGERY      ankle    HYSTERECTOMY      LAPAROSCOPIC CHOLECYSTECTOMY         SLP Recommendation  and Plan  SLP Swallowing Diagnosis: swallow WFL/no suspected pharyngeal impairment (07/28/23 1445)  SLP Diet Recommendation: regular textures (07/28/23 1445)     SLP Rec. for Method of Medication Administration: meds whole (07/28/23 1445)           Swallow Criteria for Skilled Therapeutic Interventions Met: not appropriate (07/28/23 1445)     Rehab Potential/Prognosis, Swallowing: good, to achieve stated therapy goals (07/28/23 1445)  Therapy Frequency (Swallow): evaluation only (07/28/23 1445)                                                 Plan of Care Reviewed With: patient, spouse  Progress: improving  Outcome Evaluation: ST: bedside swallow evaluation completd this date.  pt has no deficits or evidence of dysphagia.  screen for aphasia, dysarthria, anomia wfl.  pt and spouse states there are no memory concerns.      SWALLOW EVALUATION (last 72 hours)       SLP Adult Swallow Evaluation       Row Name 07/28/23 1445                   Rehab Evaluation    Document Type evaluation  -EC        Subjective Information no complaints  -EC        Patient Observations alert;cooperative;agree to therapy  -EC        Patient Effort adequate  -EC        Symptoms Noted During/After Treatment none  -EC           General Information    Patient Profile Reviewed yes  -EC        Pertinent History Of Current Problem pt admitted with R hand weakness and falls.  CVA protocol  -EC        Current Method of Nutrition regular textures  -EC        Precautions/Limitations, Vision corrective lenses needed for reading  -EC        Precautions/Limitations, Hearing WFL  -EC        Prior Level of Function-Communication WFL  -EC        Prior Level of Function-Swallowing no diet consistency restrictions  -EC        Plans/Goals Discussed with patient;spouse/S.O.  -EC        Patient's Goals for Discharge return home  -EC           Pain    Additional Documentation Pain Scale: Numbers Pre/Post-Treatment (Group)  -EC           Pain Scale: Numbers  Pre/Post-Treatment    Pretreatment Pain Rating 6/10  -EC        Posttreatment Pain Rating 6/10  -EC        Pain Location - back  -EC        Pain Intervention(s) Medication (See MAR)  -EC           Oral Motor Structure and Function    Dentition Assessment upper dentures/partial in place  -EC        Secretion Management WNL/WFL  -EC        Mucosal Quality moist, healthy  -EC        Gag Response WFL  -EC           General Eating/Swallowing Observations    Respiratory Support Currently in Use room air  -EC        Eating/Swallowing Skills self-fed  -EC        Positioning During Eating upright 90 degree;upright in bed  -EC        Utensils Used spoon;straw  -EC           Clinical Swallow Eval    Clinical Swallow Evaluation Summary There are no s/s of aspiration noted with solids or liquids.  -EC           SLP Evaluation Clinical Impression    SLP Swallowing Diagnosis swallow WFL/no suspected pharyngeal impairment  -EC        Functional Impact no impact on function  -EC        Rehab Potential/Prognosis, Swallowing good, to achieve stated therapy goals  -EC        Swallow Criteria for Skilled Therapeutic Interventions Met not appropriate  -EC           Recommendations    Therapy Frequency (Swallow) evaluation only  -EC        SLP Diet Recommendation regular textures  -EC        SLP Rec. for Method of Medication Administration meds whole  -EC                  User Key  (r) = Recorded By, (t) = Taken By, (c) = Cosigned By      Initials Name Effective Dates    EC Winnie Gill CCC-SLP 07/11/23 -                     EDUCATION  The patient has been educated in the following areas:   Cognitive Impairment Dysphagia (Swallowing Impairment).              Time Calculation:    Time Calculation- SLP       Row Name 07/28/23 1457             Time Calculation- SLP    SLP Start Time 1445  -EC      SLP Stop Time 1500  -EC      SLP Time Calculation (min) 15 min  -EC      Total Timed Code Minutes- SLP 15 minute(s)  -EC      SLP Received  On 07/28/23  -EC         Untimed Charges    SLP Eval/Re-eval  ST Eval Oral Pharyng Swallow - [id number]  -EC      [id number] Eval Oral Pharyng Swallow Minutes 15  -EC         Total Minutes    Untimed Charges Total Minutes 15  -EC       Total Minutes 15  -EC                User Key  (r) = Recorded By, (t) = Taken By, (c) = Cosigned By      Initials Name Provider Type    EC Winnie Gill CCC-SLP Speech and Language Pathologist                    Therapy Charges for Today       Code Description Service Date Service Provider Modifiers Qty    [id number]  ST EVAL ORAL PHARYNG SWALLOW 1 7/28/2023 Winnie Gill CCC-SLP GN 1                 ARNOLD Chavez  7/28/2023

## 2023-07-28 NOTE — ED PROVIDER NOTES
"Subjective   History of Present Illness  Patient presents to the ER with c/o mid back pain s/p fall yesterday at home around 1730. Patient states she was walking to the kitchen when she does not recall what happened other than she got dizziness and fell in the floor. She is unsure if she passed out, but states she just knows she was standing and then she was on the floor. Had to get help from  for getting up. She reports today her gait is \"staggering\". She also reports about 2 weeks ago she noticed she started intermittently dropping things with her right hand but states yesterday it was worse with dropping her drink and spilling it multiple times and dropping the TV remote. Denies dizziness, headache, weakness today. Patient reports worsening back pain since her fall - has not take her home pain medications today. She report she fell several years ago and broke her back and has been seeing pain clinic since. Denies loss of bowel or bladder.     Review of Systems   Constitutional:  Negative for chills and fever.   HENT: Negative.     Respiratory:  Negative for cough and shortness of breath.    Cardiovascular:  Negative for chest pain.   Gastrointestinal:  Negative for abdominal pain, nausea and vomiting.        Denies loss of bowel   Genitourinary: Negative.         Denies loss of bladder   Musculoskeletal:  Positive for back pain and gait problem. Negative for neck pain.   Skin: Negative.    Neurological:  Positive for dizziness (yesteday - none today). Negative for numbness.   Psychiatric/Behavioral: Negative.       Past Medical History:   Diagnosis Date    Anemia     History of transfusion     Hypertension        Allergies   Allergen Reactions    Diphenhydramine Hcl Myalgia     Leg pain    Metoclopramide Myalgia     Neck pain and drawing    Promethazine Other (See Comments)     \"legs go wild\"       Past Surgical History:   Procedure Laterality Date    CHOLECYSTECTOMY      COLONOSCOPY N/A 1/10/2018    " "Procedure: COLONOSCOPY;  Surgeon: Elton Santiago DO;  Location: [facility] ENDOSCOPY;  Service:     ENDOSCOPY N/A 1/10/2018    Procedure: ESOPHAGOGASTRODUODENOSCOPY;  Surgeon: Elton Santiago DO;  Location: [facility] ENDOSCOPY;  Service:     ENDOSCOPY N/A 1/31/2018    Procedure: ESOPHAGOGASTRODUODENOSCOPY;  Surgeon: Elton Santiago DO;  Location: [facility] ENDOSCOPY;  Service:     ENDOSCOPY N/A 4/4/2018    Procedure: ESOPHAGOGASTRODUODENOSCOPY;  Surgeon: Elton Santiago DO;  Location: [facility] ENDOSCOPY;  Service: Gastroenterology    ENDOSCOPY N/A 8/15/2018    Procedure: ESOPHAGOGASTRODUODENOSCOPY;  Surgeon: Elton Santiago DO;  Location: [facility] ENDOSCOPY;  Service: Gastroenterology    FRACTURE SURGERY      ankle    HYSTERECTOMY      LAPAROSCOPIC CHOLECYSTECTOMY         History reviewed. No pertinent family history.    Social History     Socioeconomic History    Marital status:    Tobacco Use    Smoking status: Never    Smokeless tobacco: Never   Substance and Sexual Activity    Alcohol use: No    Drug use: No    Sexual activity: Defer           Objective   /98   Pulse 88   Temp 97.9 °F (36.6 °C) (Oral)   Resp 20   Ht 163.8 cm (64.5\")   Wt 62.6 kg (137 lb 14.4 oz)   SpO2 99%   BMI 23.30 kg/m²     Physical Exam  Vitals and nursing note reviewed.   Constitutional:       General: She is not in acute distress.     Appearance: She is well-developed. She is not ill-appearing.   HENT:      Head: Normocephalic and atraumatic.   Cardiovascular:      Rate and Rhythm: Normal rate and regular rhythm.      Pulses: Normal pulses.      Heart sounds: Normal heart sounds. No murmur heard.  Pulmonary:      Effort: Pulmonary effort is normal. No respiratory distress.      Breath sounds: Normal breath sounds. No wheezing.   Abdominal:      General: Bowel sounds are normal. There is no distension.      Palpations: Abdomen is soft.      Tenderness: There is no abdominal tenderness.   Musculoskeletal:         General: " Tenderness present. Normal range of motion.      Cervical back: Normal range of motion and neck supple. No tenderness.   Skin:     General: Skin is warm and dry.      Capillary Refill: Capillary refill takes less than 2 seconds.   Neurological:      Mental Status: She is alert and oriented to person, place, and time.      Cranial Nerves: No cranial nerve deficit.      Sensory: No sensory deficit.      Motor: No weakness.      Coordination: Coordination normal.      Gait: Gait abnormal (patient's gait with several episodes of stumbling witnessed.).   Psychiatric:         Behavior: Behavior normal.         Thought Content: Thought content normal.         Judgment: Judgment normal.       Procedures  Results for orders placed or performed during the hospital encounter of 07/28/23   Comprehensive Metabolic Panel    Specimen: Blood   Result Value Ref Range    Glucose 93 65 - 99 mg/dL    BUN 43 (H) 8 - 23 mg/dL    Creatinine 2.04 (H) 0.57 - 1.00 mg/dL    Sodium 137 136 - 145 mmol/L    Potassium 3.1 (L) 3.5 - 5.2 mmol/L    Chloride 101 98 - 107 mmol/L    CO2 28.0 22.0 - 29.0 mmol/L    Calcium 8.8 8.6 - 10.5 mg/dL    Total Protein 6.3 6.0 - 8.5 g/dL    Albumin 3.3 (L) 3.5 - 5.2 g/dL    ALT (SGPT) 15 1 - 33 U/L    AST (SGOT) 22 1 - 32 U/L    Alkaline Phosphatase 60 39 - 117 U/L    Total Bilirubin 0.2 0.0 - 1.2 mg/dL    Globulin 3.0 gm/dL    A/G Ratio 1.1 g/dL    BUN/Creatinine Ratio 21.1 7.0 - 25.0    Anion Gap 8.0 5.0 - 15.0 mmol/L    eGFR 25.7 (L) >60.0 mL/min/1.73   Protime-INR    Specimen: Blood   Result Value Ref Range    Protime 13.6 11.1 - 15.3 Seconds    INR 1.04 0.80 - 1.20   aPTT    Specimen: Blood   Result Value Ref Range    PTT 29.4 20.0 - 40.3 seconds   Single High Sensitivity Troponin T    Specimen: Blood   Result Value Ref Range    HS Troponin T 27 (H) <10 ng/L   CBC Auto Differential    Specimen: Blood   Result Value Ref Range    WBC 5.37 3.40 - 10.80 10*3/mm3    RBC 3.46 (L) 3.77 - 5.28 10*6/mm3    Hemoglobin  10.6 (L) 12.0 - 15.9 g/dL    Hematocrit 32.8 (L) 34.0 - 46.6 %    MCV 94.8 79.0 - 97.0 fL    MCH 30.6 26.6 - 33.0 pg    MCHC 32.3 31.5 - 35.7 g/dL    RDW 12.3 12.3 - 15.4 %    RDW-SD 42.5 37.0 - 54.0 fl    MPV 10.1 6.0 - 12.0 fL    Platelets 159 140 - 450 10*3/mm3    Neutrophil % 58.3 42.7 - 76.0 %    Lymphocyte % 20.5 19.6 - 45.3 %    Monocyte % 11.5 5.0 - 12.0 %    Eosinophil % 8.4 (H) 0.3 - 6.2 %    Basophil % 1.1 0.0 - 1.5 %    Immature Grans % 0.2 0.0 - 0.5 %    Neutrophils, Absolute 3.13 1.70 - 7.00 10*3/mm3    Lymphocytes, Absolute 1.10 0.70 - 3.10 10*3/mm3    Monocytes, Absolute 0.62 0.10 - 0.90 10*3/mm3    Eosinophils, Absolute 0.45 (H) 0.00 - 0.40 10*3/mm3    Basophils, Absolute 0.06 0.00 - 0.20 10*3/mm3    Immature Grans, Absolute 0.01 0.00 - 0.05 10*3/mm3    nRBC 0.0 0.0 - 0.2 /100 WBC   Magnesium    Specimen: Blood   Result Value Ref Range    Magnesium 2.3 1.6 - 2.4 mg/dL   ECG 12 Lead Stroke Evaluation   Result Value Ref Range    QT Interval 404 ms    QTC Interval 472 ms   Type & Screen    Specimen: Blood   Result Value Ref Range    ABO Type A     RH type Positive     Antibody Screen Negative     T&S Expiration Date 7/31/2023 11:59:59 PM    PREVIOUS HISTORY    Specimen: Blood   Result Value Ref Range    Previous History Patient needs ABO/RH on day of surgery.    Green Top (Gel)   Result Value Ref Range    Extra Tube Hold for add-ons.    Lavender Top   Result Value Ref Range    Extra Tube hold for add-on    Gold Top - SST   Result Value Ref Range    Extra Tube Hold for add-ons.    Light Blue Top   Result Value Ref Range    Extra Tube Hold for add-ons.    XR Chest 1 View    Result Date: 7/28/2023  Narrative: FINDINGS: Cardiomegaly. No acute infiltrates or consolidations. No pleural effusions. There is a probable hiatal hernia.    CT Head Without Contrast Stroke Protocol    Result Date: 7/28/2023  Narrative: Noncontrast CT head COMPARISON: No comparison. HISTORY: Neurological deficit, acute stroke  suspected. TECHNIQUE: Axial images were performed from the calvarium through the base of the skull followed by 2D multiplanar reformats.  Stroke protocol was used for this procedure. FINDINGS: No evidence of stroke, hemorrhage, edema or mass.  No midline shift.  Brain stem is normal.  Included portions of the orbits are normal.  Included paranasal sinuses are normal.  Mastoid air cells bilaterally are normal.     Impression: No acute intracranial process.     CT Angiogram Head w AI Analysis of LVO, CT Angiogram Neck    Result Date: 7/28/2023  Narrative: INDICATION: Stroke, follow upNeuro deficit, acute stroke suspectedAcute Stroke. COMPARISON: None relevant. TECHNIQUE: Helical CT of the head and neck was performed with intravenous contrast in the arterial phase of contrast enhancement.  Multiplanar and 3-D MIP reformations were provided and performed on an independent workstation.  Additional axial images of the head postinfusion of intravenous contrast were performed. Measurements are based on NASCET criteria, calculated from the ratio of the linear luminal diameter of the narrowest segment of the diseased portion of the artery to the diameter of the artery beyond any poststenotic dilatation. Stenoses are graded as follows: Mild = <50%; moderate = 50-69%; severe = 70-99% FINDINGS: CTA NECK: No occlusion, flow-limiting stenosis, aneurysm, or dissection. CTA HEAD: No occlusion, flow-limiting stenosis, aneurysm, or dissection. CT HEAD: No abnormal enhancement. INCIDENTALS: None significant.     Impression: No occlusion or flow-limiting stenosis.     CT CEREBRAL PERFUSION WITH & WITHOUT CONTRAST    Result Date: 7/28/2023  Narrative: INDICATION: Neuro deficit, acute, stroke suspected. Concern for stroke. TECHNIQUE: Axial images of the head were obtained after the administration of intravenous contrast.  CT perfusion maps were obtained.  Quantitative data were obtained regarding cerebral blood flow according to various  parameters.  Rapid software was used for further evaluation of this study. FINDINGS: CBF less than 30%: 0 mL T-Max greater than 6 seconds: 0 mL Mismatch volume: 0 mL Mismatch ratio: None             ED Course  ED Course as of 07/28/23 1304   Fri Jul 28, 2023   1040 Spoke with Neuro who agrees to see patient. Advised continue with current stroke orders and notify him when patient is back from CT.  [SH]   1127 Neuro doing examination at this time.  []   1215 Spoke with family practice Dr. Cordero who agrees to admission.  [SH]      ED Course User Index  [SH] Bessie Us APRN KASPER reviewed by Kofi Adams MD, Justin Becerra MD, Krystle Cordero MD       Medical Decision Making  Patient presents to the ER with chief complaint of back pain with fall last night. Upon further HPI, patient reports syncopal episode with remote history of starting to drop thing from right hand and gait unsteady today. Neuro was consulted. Labs show CALISTA. Advised to admit for further stroke workup. Spoke with Family Practice who agrees to admission.     Amount and/or Complexity of Data Reviewed  Labs: ordered.  Radiology: ordered.  ECG/medicine tests: ordered.    Risk  Prescription drug management.        Final diagnoses:   Syncope, unspecified syncope type   CALISTA (acute kidney injury)   Gait abnormality   Acute midline thoracic back pain       ED Disposition  ED Disposition       ED Disposition   Decision to Admit    Condition   --    Comment   Level of Care: Stepdown [25]   Diagnosis: Syncope [206001]   Admitting Physician: KOFI ADAMS [[id number]]   Attending Physician: KOFI ADAMS [[id number]]                 No follow-up provider specified.       Medication List      No changes were made to your prescriptions during this visit.            Bessie Us APRN  07/28/23 1304

## 2023-07-28 NOTE — SIGNIFICANT NOTE
07/28/23 1417   OTHER   Discipline occupational therapist   Rehab Time/Intention   Session Not Performed other (see comments)  (Pt awaiting brain MRI. OT will f/u as able.)   Recommendation   OT - Next Appointment 07/29/23       
PAST SURGICAL HISTORY:  No significant past surgical history

## 2023-07-28 NOTE — CONSULTS
Teleneurology Consultation Note - Video    Date of Consultation: 07/28/2023 11:25 CDT  Reason for consult: Right sided weakness, Dizziness, Unsteady gait  Location: ED  Last known well Date/Time: 07/27/2023 16:00 CDT  ED arrival Date/Time: 07/28/2023 10:15 CDT  Date/Time Telestroke doctor on phone: 07/28/2023 10:36 CDT  Date/Time Telestroke doctor on video: 07/28/2023 11:25 CDT  Date/time telestroke doctor assessment: 07/28/2023 11:25 CDT  Diagnosis: Acute ischemic stroke  --------------------------------------------------------------    Patient Demographics:  Age: 73  Gender: Female  --------------------------------------------------------------    Assessment:  73F with hx of HTN, chronic back pain, seen due to onset of dizziness, unsteady gait, a fall, and some ongoing chronic back pain. Last known normal initially reported to be yesterday at about 1600 when she first noted the dizziness though she later notes a 1 to 2-week history of sudden onset right hand weakness leading to intermittently dropping items around the house.  NIH 1.  CT head negative for bleed.  She is outside the window for tPA consideration.  CTA/P negative for evidence of LVO or perfusion deficit.    Patient's history of back pain possibly complicating clinical picture.  Concern for possible stroke given the acute onset right upper extremity weakness and gait instability.  In this setting, I would recommend admission for an MRI of the brain to help further rule in or out a stroke versus other potential intracranial structural lesion that could be leading to her symptoms.  If stroke is confirmed, she may be a candidate for a course of dual antiplatelet therapy as written below.  Please consult inpatient neurology to help guide these initial recommendations as her hospitalization and testing proceed.  Ongoing cares per her ED/admitting physicians.  --------------------------------------------------------------    Thrombolytics decision:  tPA  Given: No  tPA exclusion reason: Out of 4.5 hr window  --------------------------------------------------------------    Recommendation:  - Admit to Tele unit  - Imaging studies: MRI brain without contrast, TTE with bubble  - Give Aspirin 325 mg daily, first dose now after passing RN bedside swallow. Consider 300 mg ID if the patient can't tolerate PO. Give plavix 300 mg loading dose as well and from tomorrow aspirin 81 mg plus Plavix 75 mg for 3 weeks. After 3 weeks continue with monotherapy aspirin 81 mg.   - Hydrate with IVF with 0.9% NS at 75 cc/hr while NPO  - Permissive hypertension up to 220/120 mmhg. PRN labetalol or hydralazine to maintain BP within parameters  - Lab: CBC w/diff, CMP, Troponin, Lipid profile, HbA1C, UA, UDS.  - Start lipitor 80 mg or Crestor 40 mg qhs po once oral access is obtained  - Goal LDL is less than 70 mg/dL  - Therapy: PT/OT and speech evals  - Keep POC glucose 140-180 for patients with diabetes. Sliding scale insulin as needed  - Keep normothermic. PRN tylenol if T > 100.4  - Stroke education to patient and family  - chemical DVT prophylaxis with lovenox or heparin.  - SCDs for mechanical DVT prophylaxis  - Additional workup per inhouse team  --------------------------------------------------------------    Follow-up recommendations: In this patient with acute neurologic deficits, we would recommend that the inpatient neurology team be consulted for further ongoing recommendations. Please be sure to place the consult through EMR. Please call us at [employer] # [phone] and press 1 for emergent and press 2 for non-emergent if you have any questions.  --------------------------------------------------------------      Updated ED provider with recommendations at: 07/28/2023 11:33 CDT  --------------------------------------------------------------      History of Presenting Illness:  73F with hx of HTN, chronic back pain, seen due to onset of dizziness, unsteady gait, a fall, and some  ongoing chronic back pain. Last known normal initially reported to be yesterday at about 1600 when she first noted the dizziness. It is described as some lightheadedness when going from sitting to standing.     Patient later states however, that she has been having some right upper extremity weakness for nearly two weeks. She notes she has intermittently dropping items around the house.   --------------------------------------------------------------      Review of system: The patient's pertinent positive and negatives for the problem focused assessment is documented in HPI.  --------------------------------------------------------------    I have reviewed the chart for pertinent medical, surgical and social history.  Medical History:  Allergies: Reviewed in EMR  --------------------------------------------------------------    Pertinent medication prior to arrival:   Antiplatelet prior to arrival? None  Anticoagulation prior to arrival? None  --------------------------------------------------------------    NIHSS:  NIHSS time: 2023 11:25 CDT  1a Level of consciousness: 0  1b LOC questions: 0  1c LOC commands: 0  2 Best Gaze: 0  3 Visual: 0  4 Facial Palsy: 0  5a Motor left arm: 0  5b Motor right arm: 0  6a Motor left le  6b Motor right le  7 Limb ataxia: 0  8 Sensory: 0  9 Best language: 0  10 Dysarthria: 1  11 Extinction and inattention: 0  Total: 1  --------------------------------------------------------------      Pre-stroke mRS: 0  --------------------------------------------------------------    Personal review of CNS Imaging:  CTH performed? Yes  CT Interpretation date and time: 2023 11:13 CDT  Hemorrhage vs non- hemorrhage? No acute intracranial hemorrhage    CTA head and neck performed? Yes  CTA review date and time: 2023 11:22 CDT  Results of CTA: No intracranial or extracranial large vessel occlusion or significant stenosis  CTP performed?   Core infarction volume: 0  Mismatch  volume: 0  Mismatch ratio: NA        Teleneurology patient verification & consent    I have proceeded with this evaluation at the direct request of the referring practitioner as this is felt to be an emergency setting and no appropriate specialist is available at bedside to perform these evaluations. The [facility] practitioner has reported to me this is the correct patient and has obtained verbal consent from the patient/surrogate to perform this voluntary telemedicine evaluation (including obtaining history, performing examination and reviewing data provided by the patient and/or originating [facility] care provider). I attest that I introduced myself to the patient, provided my credentials, disclosed my location, and determined that, based on review of the patient's chart and discussion with the patient's primary team, telemedicine via a HIPAA-compliant, real-time, face-to-face, two-way, interactive audio and video platform is an appropriate and effective means of providing this service. The patient/surrogate has the right to refuse this evaluation as they have been explained risks (including potential loss of confidentiality), benefits, alternatives, and the potential need for subsequent face to face care. Patient/surrogate understands that there is a risk of medical inaccuracies given that our recommendations will be made based on reported data (and we must therefore assume this information is accurate). Knowing that there is a risk that this information is not reported accurately, and that the telemedicine video, audio, or data feed may be incomplete, the patient agrees to proceed with evaluation and holds us harmless knowing these risks. In this evaluation, we will be providing recommendations only. The ultimate decision to follow, or not follow, these recommendations will be left to the bedside treating/requesting practitioner. The patient/surrogate has been notified that  other healthcare professionals (including technical personnel) may be involved in this audio-video evaluation. All laws concerning confidentiality and patient access to medical records and copies of medical records apply to telemedicine. The patient/surrogate has received the originating site's Health Notice of Privacy Practices.    Medical Data Reviewed:  Data reviewed include clinical labs, radiology, medical test;  Obtaining/reviewing old medical records;  Obtaining case history from another source;  Independent review of CNS images    IDarlene MD, saw patient on 07/28/2023, 11:25 CDT for an initial in-patient or emergency room telemedicine face-to-face consult using interactive technology and have spent  greater than 35 minutes of time on the care of the patient today including time spent reviewing medical records, reviewing laboratory data, radiology, and other diagnostic tests, coordinating care with ancillary staff in addition to examining the patient and formulating a plan of care, discussing and counseling the patient and family. Greater than 50 percent of this time was spent in face to face and coordination of care. The location of the [facility]. My location was [place]. I was assisted with the exam by Nurse

## 2023-07-29 VITALS
DIASTOLIC BLOOD PRESSURE: 75 MMHG | WEIGHT: 137.57 LBS | SYSTOLIC BLOOD PRESSURE: 128 MMHG | HEART RATE: 81 BPM | BODY MASS INDEX: 23.49 KG/M2 | RESPIRATION RATE: 16 BRPM | HEIGHT: 64 IN | OXYGEN SATURATION: 98 % | TEMPERATURE: 98.3 F

## 2023-07-29 PROBLEM — I63.9 STROKE: Status: RESOLVED | Noted: 2023-07-28 | Resolved: 2023-07-29

## 2023-07-29 PROBLEM — R29.898 RIGHT HAND WEAKNESS: Status: RESOLVED | Noted: 2023-07-29 | Resolved: 2023-07-29

## 2023-07-29 PROBLEM — R55 SYNCOPE: Status: RESOLVED | Noted: 2023-07-28 | Resolved: 2023-07-29

## 2023-07-29 PROBLEM — R29.898 RIGHT HAND WEAKNESS: Status: ACTIVE | Noted: 2023-07-29

## 2023-07-29 PROBLEM — E87.6 HYPOKALEMIA: Status: RESOLVED | Noted: 2023-07-28 | Resolved: 2023-07-29

## 2023-07-29 PROBLEM — N17.9 AKI (ACUTE KIDNEY INJURY): Status: RESOLVED | Noted: 2023-07-28 | Resolved: 2023-07-29

## 2023-07-29 LAB
ALBUMIN SERPL-MCNC: 2.9 G/DL (ref 3.5–5.2)
ALBUMIN/GLOB SERPL: 1.1 G/DL
ALP SERPL-CCNC: 51 U/L (ref 39–117)
ALT SERPL W P-5'-P-CCNC: 12 U/L (ref 1–33)
ANION GAP SERPL CALCULATED.3IONS-SCNC: 8 MMOL/L (ref 5–15)
AST SERPL-CCNC: 19 U/L (ref 1–32)
BASOPHILS # BLD AUTO: 0.06 10*3/MM3 (ref 0–0.2)
BASOPHILS NFR BLD AUTO: 1.5 % (ref 0–1.5)
BILIRUB SERPL-MCNC: 0.3 MG/DL (ref 0–1.2)
BUN SERPL-MCNC: 18 MG/DL (ref 8–23)
BUN/CREAT SERPL: 20 (ref 7–25)
CALCIUM SPEC-SCNC: 8.5 MG/DL (ref 8.6–10.5)
CHLORIDE SERPL-SCNC: 110 MMOL/L (ref 98–107)
CHOLEST SERPL-MCNC: 163 MG/DL (ref 0–200)
CK SERPL-CCNC: 146 U/L (ref 20–180)
CO2 SERPL-SCNC: 22 MMOL/L (ref 22–29)
CREAT SERPL-MCNC: 0.9 MG/DL (ref 0.57–1)
DEPRECATED RDW RBC AUTO: 40.7 FL (ref 37–54)
EGFRCR SERPLBLD CKD-EPI 2021: 68.5 ML/MIN/1.73
EOSINOPHIL # BLD AUTO: 0.31 10*3/MM3 (ref 0–0.4)
EOSINOPHIL NFR BLD AUTO: 7.6 % (ref 0.3–6.2)
ERYTHROCYTE [DISTWIDTH] IN BLOOD BY AUTOMATED COUNT: 11.9 % (ref 12.3–15.4)
GLOBULIN UR ELPH-MCNC: 2.7 GM/DL
GLUCOSE BLDC GLUCOMTR-MCNC: 87 MG/DL (ref 70–130)
GLUCOSE SERPL-MCNC: 84 MG/DL (ref 65–99)
HBA1C MFR BLD: 4.9 % (ref 4.8–5.6)
HCT VFR BLD AUTO: 29.7 % (ref 34–46.6)
HDLC SERPL-MCNC: 53 MG/DL (ref 40–60)
HGB BLD-MCNC: 10.1 G/DL (ref 12–15.9)
IMM GRANULOCYTES # BLD AUTO: 0.01 10*3/MM3 (ref 0–0.05)
IMM GRANULOCYTES NFR BLD AUTO: 0.2 % (ref 0–0.5)
LDLC SERPL CALC-MCNC: 98 MG/DL (ref 0–100)
LDLC/HDLC SERPL: 1.84 {RATIO}
LYMPHOCYTES # BLD AUTO: 0.7 10*3/MM3 (ref 0.7–3.1)
LYMPHOCYTES NFR BLD AUTO: 17.2 % (ref 19.6–45.3)
MCH RBC QN AUTO: 31.4 PG (ref 26.6–33)
MCHC RBC AUTO-ENTMCNC: 34 G/DL (ref 31.5–35.7)
MCV RBC AUTO: 92.2 FL (ref 79–97)
MONOCYTES # BLD AUTO: 0.35 10*3/MM3 (ref 0.1–0.9)
MONOCYTES NFR BLD AUTO: 8.6 % (ref 5–12)
NEUTROPHILS NFR BLD AUTO: 2.63 10*3/MM3 (ref 1.7–7)
NEUTROPHILS NFR BLD AUTO: 64.9 % (ref 42.7–76)
NRBC BLD AUTO-RTO: 0 /100 WBC (ref 0–0.2)
PLATELET # BLD AUTO: 128 10*3/MM3 (ref 140–450)
PMV BLD AUTO: 10.2 FL (ref 6–12)
POTASSIUM SERPL-SCNC: 4.5 MMOL/L (ref 3.5–5.2)
PROT SERPL-MCNC: 5.6 G/DL (ref 6–8.5)
RBC # BLD AUTO: 3.22 10*6/MM3 (ref 3.77–5.28)
SODIUM SERPL-SCNC: 140 MMOL/L (ref 136–145)
TRIGL SERPL-MCNC: 62 MG/DL (ref 0–150)
VLDLC SERPL-MCNC: 12 MG/DL (ref 5–40)
WBC NRBC COR # BLD: 4.06 10*3/MM3 (ref 3.4–10.8)

## 2023-07-29 PROCEDURE — 83036 HEMOGLOBIN GLYCOSYLATED A1C: CPT | Performed by: STUDENT IN AN ORGANIZED HEALTH CARE EDUCATION/TRAINING PROGRAM

## 2023-07-29 PROCEDURE — 97166 OT EVAL MOD COMPLEX 45 MIN: CPT

## 2023-07-29 PROCEDURE — 36415 COLL VENOUS BLD VENIPUNCTURE: CPT | Performed by: STUDENT IN AN ORGANIZED HEALTH CARE EDUCATION/TRAINING PROGRAM

## 2023-07-29 PROCEDURE — 85025 COMPLETE CBC W/AUTO DIFF WBC: CPT | Performed by: STUDENT IN AN ORGANIZED HEALTH CARE EDUCATION/TRAINING PROGRAM

## 2023-07-29 PROCEDURE — 63710000001 ONDANSETRON PER 8 MG: Performed by: STUDENT IN AN ORGANIZED HEALTH CARE EDUCATION/TRAINING PROGRAM

## 2023-07-29 PROCEDURE — 99233 SBSQ HOSP IP/OBS HIGH 50: CPT | Performed by: PSYCHIATRY & NEUROLOGY

## 2023-07-29 PROCEDURE — 99239 HOSP IP/OBS DSCHRG MGMT >30: CPT

## 2023-07-29 PROCEDURE — 97162 PT EVAL MOD COMPLEX 30 MIN: CPT

## 2023-07-29 PROCEDURE — 80053 COMPREHEN METABOLIC PANEL: CPT | Performed by: STUDENT IN AN ORGANIZED HEALTH CARE EDUCATION/TRAINING PROGRAM

## 2023-07-29 PROCEDURE — 82948 REAGENT STRIP/BLOOD GLUCOSE: CPT

## 2023-07-29 PROCEDURE — 80061 LIPID PANEL: CPT | Performed by: STUDENT IN AN ORGANIZED HEALTH CARE EDUCATION/TRAINING PROGRAM

## 2023-07-29 PROCEDURE — 82550 ASSAY OF CK (CPK): CPT | Performed by: STUDENT IN AN ORGANIZED HEALTH CARE EDUCATION/TRAINING PROGRAM

## 2023-07-29 RX ORDER — CLOPIDOGREL BISULFATE 75 MG/1
75 TABLET ORAL DAILY
Qty: 30 TABLET | Refills: 0 | Status: SHIPPED | OUTPATIENT
Start: 2023-07-29

## 2023-07-29 RX ORDER — ATORVASTATIN CALCIUM 80 MG/1
40 TABLET, FILM COATED ORAL NIGHTLY
Qty: 30 TABLET | Refills: 0 | Status: SHIPPED | OUTPATIENT
Start: 2023-07-29

## 2023-07-29 RX ORDER — ASPIRIN 81 MG/1
81 TABLET, CHEWABLE ORAL DAILY
Qty: 20 TABLET | Refills: 0 | Status: SHIPPED | OUTPATIENT
Start: 2023-07-29

## 2023-07-29 RX ADMIN — TRAMADOL HYDROCHLORIDE 50 MG: 50 TABLET ORAL at 00:27

## 2023-07-29 RX ADMIN — SODIUM CHLORIDE 100 ML/HR: 9 INJECTION, SOLUTION INTRAVENOUS at 06:46

## 2023-07-29 RX ADMIN — ASPIRIN 81 MG: 81 TABLET, CHEWABLE ORAL at 08:49

## 2023-07-29 RX ADMIN — PANTOPRAZOLE SODIUM 40 MG: 40 TABLET, DELAYED RELEASE ORAL at 06:47

## 2023-07-29 RX ADMIN — CYANOCOBALAMIN TAB 1000 MCG 1000 MCG: 1000 TAB at 08:50

## 2023-07-29 RX ADMIN — FOLIC ACID 1 MG: 1 TABLET ORAL at 08:49

## 2023-07-29 RX ADMIN — CLOPIDOGREL BISULFATE 75 MG: 75 TABLET ORAL at 08:47

## 2023-07-29 RX ADMIN — ONDANSETRON 4 MG: 4 TABLET, FILM COATED ORAL at 08:57

## 2023-07-29 RX ADMIN — OXYCODONE AND ACETAMINOPHEN 1 TABLET: 10; 325 TABLET ORAL at 08:49

## 2023-07-29 RX ADMIN — ATENOLOL 25 MG: 25 TABLET ORAL at 08:50

## 2023-07-29 NOTE — THERAPY EVALUATION
Patient Name: Krystina Duong  : 1950    MRN: [id number]                              Today's Date: 2023       Admit Date: 2023    Visit Dx:     ICD-10-CM ICD-9-CM   1. Syncope, unspecified syncope type  R55 780.2   2. CALISTA (acute kidney injury)  N17.9 584.9   3. Gait abnormality  R26.9 781.2   4. Acute midline thoracic back pain  M54.6 724.1   5. Impaired mobility and ADLs [Z74.09, Z78.9 (ICD-10-CM)]  Z74.09 V49.89    Z78.9    6. Impaired functional mobility, balance, gait, and endurance [Z74.09 (ICD-10-CM)]  Z74.09 V49.89     Patient Active Problem List   Diagnosis    Hypertension, essential, benign    Back pain due to inflammatory process    Anxiety state    Closed compression fracture of thoracic vertebra    Degeneration of lumbar intervertebral disc    Depressive disorder    Fibromyalgia    Hypercholesterolemia    Restless legs syndrome (RLS)    Senile osteoporosis    Tear of meniscus of left knee, subsequent encounter    Anemia due to vitamin B12 deficiency     Past Medical History:   Diagnosis Date    Anemia     History of transfusion     Hypertension      Past Surgical History:   Procedure Laterality Date    CHOLECYSTECTOMY      COLONOSCOPY N/A 1/10/2018    Procedure: COLONOSCOPY;  Surgeon: Elton Santiago DO;  Location: [facility] ENDOSCOPY;  Service:     ENDOSCOPY N/A 1/10/2018    Procedure: ESOPHAGOGASTRODUODENOSCOPY;  Surgeon: Elton Santiago DO;  Location: [facility] ENDOSCOPY;  Service:     ENDOSCOPY N/A 2018    Procedure: ESOPHAGOGASTRODUODENOSCOPY;  Surgeon: Elton Santiago DO;  Location: [facility] ENDOSCOPY;  Service:     ENDOSCOPY N/A 2018    Procedure: ESOPHAGOGASTRODUODENOSCOPY;  Surgeon: Elton Santiago DO;  Location: [facility] ENDOSCOPY;  Service: Gastroenterology    ENDOSCOPY N/A 8/15/2018    Procedure: ESOPHAGOGASTRODUODENOSCOPY;  Surgeon: Elton Santiago DO;  Location: [facility] ENDOSCOPY;  Service: Gastroenterology    FRACTURE SURGERY      ankle    HYSTERECTOMY       LAPAROSCOPIC CHOLECYSTECTOMY        General Information       Row Name 07/29/23 0903          Physical Therapy Time and Intention    Document Type evaluation  -MM     Mode of Treatment physical therapy;occupational therapy  -MM       Row Name 07/29/23 0903          General Information    Patient Profile Reviewed yes  -MM     Prior Level of Function independent:;gait;cooking;cleaning;driving;shopping;bathing;dressing;grooming;feeding;transfer  -MM     Existing Precautions/Restrictions fall  -MM     Barriers to Rehab medically complex  -MM       Row Name 07/29/23 0903          Living Environment    People in Home spouse  -MM       Row Name 07/29/23 0903          Home Main Entrance    Number of Stairs, Main Entrance two  -MM     Stair Railings, Main Entrance railing on right side (ascending)  -MM       Row Name 07/29/23 0903          Stairs Within Home, Primary    Stairs, Within Home, Primary Patient used FWW after she fractured back in 2008 but has not recently needed to use. Tub/shower, has chair available, but does not currently use. No handrails. Standard height toilet.  -MM     Number of Stairs, Within Home, Primary seven  Split level home.  -MM       Row Name 07/29/23 0903          Cognition    Orientation Status (Cognition) oriented x 4  -MM       Row Name 07/29/23 0903          Safety Issues, Functional Mobility    Impairments Affecting Function (Mobility) balance  -MM               User Key  (r) = Recorded By, (t) = Taken By, (c) = Cosigned By      Initials Name Provider Type    MM Krystle Leone, PT Physical Therapist                   Mobility       Row Name 07/29/23 0903          Bed Mobility    Bed Mobility supine-sit;sit-supine  -MM     Supine-Sit [place] (Bed Mobility) modified independence  -MM     Sit-Supine [place] (Bed Mobility) modified independence  -MM     Assistive Device (Bed Mobility) bed rails;head of bed elevated  -MM       Row Name 07/29/23 0903          Bed-Chair Transfer     Bed-Chair [place] (Transfers) contact guard  -MM       Row Name 07/29/23 0903          Sit-Stand Transfer    Sit-Stand [place] (Transfers) contact guard  -MM       Row Name 07/29/23 0903          Gait/Stairs (Locomotion)    [place] Level (Gait) contact guard  -MM     Distance in Feet (Gait) 60' X 1  -MM     Deviations/Abnormal Patterns (Gait) gait speed decreased;scissoring;ataxic  -MM     Comment, (Gait/Stairs) Patient ambulated 60' X 1 with CGA and no AD. Patient had ~ 3 LOB to the L side and was able to self correct, but reports this is not normal for her.  -MM               User Key  (r) = Recorded By, (t) = Taken By, (c) = Cosigned By      Initials Name Provider Type    Krystle Clements PT Physical Therapist                   Obj/Interventions       Row Name 07/29/23 0903          Range of Motion Comprehensive    General Range of Motion bilateral lower extremity ROM WFL  -MM       [facility] Name 07/29/23 0903          Strength Comprehensive (MMT)    Comment, General Manual Muscle Testing (MMT) Assessment BLE 4/5 grossly.  -MM       Row Name 07/29/23 0903          Sensory Assessment (Somatosensory)    Sensory Assessment (Somatosensory) LE sensation intact  -MM               User Key  (r) = Recorded By, (t) = Taken By, (c) = Cosigned By      Initials Name Provider Type    Krystle Clements, PT Physical Therapist                   Goals/Plan       Row Name 07/29/23 0903          Bed Mobility Goal 1 (PT)    Activity/Assistive Device (Bed Mobility Goal 1, PT) sit to supine;supine to sit  -MM     [place] Level/Cues Needed (Bed Mobility Goal 1, PT) independent  -MM     Time Frame (Bed Mobility Goal 1, PT) by discharge  -MM     Strategies/Barriers (Bed Mobility Goal 1, PT) HOB flat, no hand rails.  -MM     Progress/Outcomes (Bed Mobility Goal 1, PT) goal not met  -MM       Row Name 07/29/23 0903          Transfer Goal 1 (PT)    Activity/Assistive Device (Transfer Goal 1, PT)  sit-to-stand/stand-to-sit;bed-to-chair/chair-to-bed  -MM     [place] Level/Cues Needed (Transfer Goal 1, PT) independent  -MM     Time Frame (Transfer Goal 1, PT) by discharge  -MM     Progress/Outcome (Transfer Goal 1, PT) goal not met  -MM       Row Name 07/29/23 0903          Gait Training Goal 1 (PT)    [place] Level (Gait Training Goal 1, PT) independent  -MM     Distance (Gait Training Goal 1, PT) 150' X 1  -MM     Time Frame (Gait Training Goal 1, PT) by discharge  -MM     Strategies/Barriers (Gait Training Goal 1, PT) No LOB  -MM     Progress/Outcome (Gait Training Goal 1, PT) goal not met  -MM       Row Name 07/29/23 0903          Stairs Goal 1 (PT)    Activity/Assistive Device (Stairs Goal 1, PT) ascending stairs;descending stairs  -MM     [place] Level/Cues Needed (Stairs Goal 1, PT) independent  -MM     Number of Stairs (Stairs Goal 1, PT) 7  -MM     Time Frame (Stairs Goal 1, PT) by discharge  -MM     Progress/Outcome (Stairs Goal 1, PT) goal not met  -MM       Row Name 07/29/23 0903          Problem Specific Goal 1 (PT)    Problem Specific Goal 1 (PT) Improve Tinetti fall risk assessment score to 25/28 to decrease fall risk.  -MM     Time Frame (Problem Specific Goal 1, PT) by discharge  -MM     Progress/Outcome (Problem Specific Goal 1, PT) goal not met  -MM       Row Name 07/29/23 0903          Therapy Assessment/Plan (PT)    Planned Therapy Interventions (PT) balance training;bed mobility training;gait training;home exercise program;joint mobilization;manual therapy techniques;strengthening;stretching;stair training;ROM (range of motion);postural re-education;patient/family education;transfer training;neuromuscular re-education;motor coordination training  -MM               User Key  (r) = Recorded By, (t) = Taken By, (c) = Cosigned By      Initials Name Provider Type    Krystle Clements, PT Physical Therapist                   Clinical Impression       Row Name 07/29/23 0903           Pain    Pretreatment Pain Rating 6/10  -MM     Posttreatment Pain Rating 5/10  -MM     Pain Location - Side/Orientation Bilateral  -MM     Pain Location lower  -MM     Pain Location - back  -MM     Pain Intervention(s) Repositioned;Distraction;Ambulation/increased activity  -MM       Row Name 07/29/23 0903          Plan of Care Review    Plan of Care Reviewed With patient;spouse  -MM     Outcome Evaluation PT evaluation completed, co-eval with OT. Patient AO X 4 and agreeable to therapy. Patient supine<>sit Mod I, sit>stand CGA and bed>chair CGA. Patient ambulated 60' X 1 with CGA and no AD. Patient demonstrated at least 3 LOB to the L side and was able to self correct, but reports this is not normal for her. Tinetti fall risk assessment 18/28 indicating high fall risk. Recommended patient ambulate with FWW to decrease fall risk, FWW available at home. Goals created, continue skilled IP PT. Recommend patient d/c home with assist.  -MM       Row Name 07/29/23 0903          Therapy Assessment/Plan (PT)    Rehab Potential (PT) good, to achieve stated therapy goals  -MM     Criteria for Skilled Interventions Met (PT) yes;skilled treatment is necessary  -MM     Therapy Frequency (PT) other (see comments)  5-7 days/week  -MM       Row Name 07/29/23 0903          Vital Signs    Pre Systolic BP Rehab 189  -MM     Pre Treatment Diastolic BP 79  -MM     Post Systolic BP Rehab 177  -MM     Post Treatment Diastolic BP 90  -MM     Pretreatment Heart Rate (beats/min) 82  -MM     Posttreatment Heart Rate (beats/min) 75  -MM     Pre SpO2 (%) 98  -MM     O2 Delivery Pre Treatment room air  -MM     Post SpO2 (%) 98  -MM     O2 Delivery Post Treatment room air  -MM     Pre Patient Position Supine  -MM     Post Patient Position Supine  -MM       Row Name 07/29/23 0903          Positioning and Restraints    Pre-Treatment Position in bed  -MM     Post Treatment Position bed  -MM     In Bed encouraged to call for assist;call  "light within reach;with family/caregiver  -MM               User Key  (r) = Recorded By, (t) = Taken By, (c) = Cosigned By      Initials Name Provider Type    Krystle Clements, PT Physical Therapist                   Outcome Measures       Row Name 07/29/23 0903          How much help from another person do you currently need...    Turning from your back to your side while in flat bed without using bedrails? 4  -MM     Moving from lying on back to sitting on the side of a flat bed without bedrails? 4  -MM     Moving to and from a bed to a chair (including a wheelchair)? 3  -MM     Standing up from a chair using your arms (e.g., wheelchair, bedside chair)? 3  -MM     Climbing 3-5 steps with a railing? 2  -MM     To walk in hospital room? 3  -MM     AM-PAC 6 Clicks Score (PT) 19  -MM     Highest level of mobility 6 --> Walked 10 steps or more  -MM       Row Name 07/29/23 0905 07/29/23 0903       Modified [place] Scale    Pre-Stroke Modified [place] Scale 0 - No Symptoms at all.  -SA 0 - No Symptoms at all.  -MM    Modified [place] Scale 1 - No significant disability despite symptoms.  Able to carry out all usual duties and activities.  -SA 1 - No significant disability despite symptoms.  Able to carry out all usual duties and activities.  -MM      Row Name 07/29/23 0903          Tinetti Assessment    Tinetti Assessment yes  -MM     Sitting Balance 1  -MM     Arises 2  -MM     Attempts to Rise 2  -MM     Immediate Standing Balance (first 5 sec) 2  -MM     Standing Balance 1  -MM     Sternal Nudge (feet close together) 1  -MM     Eyes Closed (feet close together) 0  -MM     Turning 360 Degrees- Steps 0  -MM     Turning 360 Degrees- Steadiness 1  -MM     Sitting Down 2  -MM     Tinetti Balance Score 12  -MM     Gait Initiation (immediate after told \"go\") 1  -MM     Step Length- Right Swing 1  -MM     Step Length- Left Swing 1  -MM     Foot Clearance- Right Foot 1  -MM     Foot Clearance- Left Foot 1  -MM     Step " Symmetry 0  -MM     Step Continuity 0  -MM     Path (excursion) 0  -MM     Trunk 0  -MM     Base of Support 1  -MM     Gait Score 6  -MM     Tinetti Total Score 18  -MM       Row Name 07/29/23 0905 07/29/23 0903       Functional Assessment    Outcome Measure Options AM-PAC 6 Clicks Daily Activity (OT);Modified [place]  -SA AM-PAC 6 Clicks Basic Mobility (PT);Tinetti;Modified [place]  -MM              User Key  (r) = Recorded By, (t) = Taken By, (c) = Cosigned By      Initials Name Provider Type    SA Rosa Malin, OT Occupational Therapist    Krystle Clements PT Physical Therapist                                 Physical Therapy Education       Title: PT OT SLP Therapies (In Progress)       Topic: Physical Therapy (In Progress)       Point: Mobility training (In Progress)       Learning Progress Summary             Patient Acceptance, E, NR by MM at 7/29/2023 0939    Comment: PT POC and goals, proper hand placement to facilitate transfers, use of FWW to decrease fall risk.   Significant Other Acceptance, E, NR by MM at 7/29/2023 0939    Comment: PT POC and goals, proper hand placement to facilitate transfers, use of FWW to decrease fall risk.                         Point: Home exercise program (Not Started)       Learner Progress:  Not documented in this visit.              Point: Body mechanics (Not Started)       Learner Progress:  Not documented in this visit.              Point: Precautions (Not Started)       Learner Progress:  Not documented in this visit.                              User Key       Initials Effective Dates Name Provider Type Discipline     06/26/23 -  Krystle Leone PT Physical Therapist PT                  PT Recommendation and Plan  Planned Therapy Interventions (PT): balance training, bed mobility training, gait training, home exercise program, joint mobilization, manual therapy techniques, strengthening, stretching, stair training, ROM (range of motion), postural  re-education, patient/family education, transfer training, neuromuscular re-education, motor coordination training  Plan of Care Reviewed With: patient, spouse  Outcome Evaluation: PT evaluation completed, co-eval with OT. Patient AO X 4 and agreeable to therapy. Patient supine<>sit Mod I, sit>stand CGA and bed>chair CGA. Patient ambulated 60' X 1 with CGA and no AD. Patient demonstrated at least 3 LOB to the L side and was able to self correct, but reports this is not normal for her. Tinetti fall risk assessment 18/28 indicating high fall risk. Recommended patient ambulate with FWW to decrease fall risk, FWW available at home. Goals created, continue skilled IP PT. Recommend patient d/c home with assist.     Time Calculation:   PT Evaluation Complexity  History, PT Evaluation Complexity: 3 or more personal factors and/or comorbidities  Examination of Body Systems (PT Eval Complexity): total of 4 or more elements  Clinical Presentation (PT Evaluation Complexity): evolving  Clinical Decision Making (PT Evaluation Complexity): moderate complexity  Overall Complexity (PT Evaluation Complexity): moderate complexity     PT Charges       Row Name 07/29/23 0903             Time Calculation    Start Time 0903  -MM      Stop Time 0940  -MM      Time Calculation (min) 37 min  -MM      PT Received On 07/29/23  -MM      PT Goal Re-Cert Due Date 08/11/23  -MM         Untimed Charges    PT Eval/Re-eval Minutes 35  -MM         Total Minutes    Untimed Charges Total Minutes 35  -MM       Total Minutes 35  -MM                User Key  (r) = Recorded By, (t) = Taken By, (c) = Cosigned By      Initials Name Provider Type    MM Krystle Leone, PT Physical Therapist                  Therapy Charges for Today       Code Description Service Date Service Provider Modifiers Qty    [id number]  PT EVAL MOD COMPLEXITY 2 7/29/2023 Krystle Leone, PT GP 1            PT G-Codes  Outcome Measure Options: AM-PAC 6 Clicks Daily  Activity (OT), Modified [place]  AM-PAC 6 Clicks Score (PT): 19  AM-PAC 6 Clicks Score (OT): 22  Modified [place] Scale: 1 - No significant disability despite symptoms.  Able to carry out all usual duties and activities.  Tinetti Total Score: 18  PT Discharge Summary  Anticipated Discharge Disposition (PT): home with assist    Krystle Leone, PT  7/29/2023

## 2023-07-29 NOTE — PLAN OF CARE
Goal Outcome Evaluation:  Plan of Care Reviewed With: patient, spouse           Outcome Evaluation: PT evaluation completed, co-eval with OT. Patient AO X 4 and agreeable to therapy. Patient supine<>sit Mod I, sit>stand CGA and bed>chair CGA. Patient ambulated 60' X 1 with CGA and no AD. Patient demonstrated at least 3 LOB to the L side and was able to self correct, but reports this is not normal for her. Tinetti fall risk assessment 18/28 indicating high fall risk. Recommended patient ambulate with FWW to decrease fall risk, FWW available at home. Goals created, continue skilled IP PT. Recommend patient d/c home with assist.      Anticipated Discharge Disposition (PT): home with assist

## 2023-07-29 NOTE — THERAPY EVALUATION
Patient Name: Krystina Duong  : 1950    MRN: [id number]                              Today's Date: 2023       Admit Date: 2023    Visit Dx:     ICD-10-CM ICD-9-CM   1. Syncope, unspecified syncope type  R55 780.2   2. CALISTA (acute kidney injury)  N17.9 584.9   3. Gait abnormality  R26.9 781.2   4. Acute midline thoracic back pain  M54.6 724.1   5. Impaired mobility and ADLs [Z74.09, Z78.9 (ICD-10-CM)]  Z74.09 V49.89    Z78.9      Patient Active Problem List   Diagnosis    Hypertension, essential, benign    Back pain due to inflammatory process    Anxiety state    Closed compression fracture of thoracic vertebra    Degeneration of lumbar intervertebral disc    Depressive disorder    Fibromyalgia    Hypercholesterolemia    Restless legs syndrome (RLS)    Senile osteoporosis    Tear of meniscus of left knee, subsequent encounter    Anemia due to vitamin B12 deficiency     Past Medical History:   Diagnosis Date    Anemia     History of transfusion     Hypertension      Past Surgical History:   Procedure Laterality Date    CHOLECYSTECTOMY      COLONOSCOPY N/A 1/10/2018    Procedure: COLONOSCOPY;  Surgeon: Elton Santiago DO;  Location: [facility] ENDOSCOPY;  Service:     ENDOSCOPY N/A 1/10/2018    Procedure: ESOPHAGOGASTRODUODENOSCOPY;  Surgeon: Elton Santiago DO;  Location: [facility] ENDOSCOPY;  Service:     ENDOSCOPY N/A 2018    Procedure: ESOPHAGOGASTRODUODENOSCOPY;  Surgeon: Elton Santiago DO;  Location: [facility] ENDOSCOPY;  Service:     ENDOSCOPY N/A 2018    Procedure: ESOPHAGOGASTRODUODENOSCOPY;  Surgeon: Elton Santiago DO;  Location: [facility] ENDOSCOPY;  Service: Gastroenterology    ENDOSCOPY N/A 8/15/2018    Procedure: ESOPHAGOGASTRODUODENOSCOPY;  Surgeon: Elton Santiago DO;  Location: [facility] ENDOSCOPY;  Service: Gastroenterology    FRACTURE SURGERY      ankle    HYSTERECTOMY      LAPAROSCOPIC CHOLECYSTECTOMY        General Information       Row Name 23 0905          OT Time  and Intention    Document Type evaluation  -     Mode of Treatment physical therapy;occupational therapy  -       Row Name 07/29/23 0905          General Information    Patient Profile Reviewed yes  -SA     Prior Level of Function independent:;gait;community mobility;all household mobility;home management;cooking;cleaning;driving;shopping;ADL's  -     Existing Precautions/Restrictions fall  -     Barriers to Rehab medically complex  -       Row Name 07/29/23 0905          Living Environment    People in Home spouse  -       Row Name 07/29/23 0905          Home Main Entrance    Number of Stairs, Main Entrance two  -SA     Stair Railings, Main Entrance railing on right side (ascending)  -       Row Name 07/29/23 0905          Stairs Within Home, Primary    Stairs, Within Home, Primary Patient used FWW after she fractured back in 2008 but has not recently needed to use. Tub/shower, has chair available, but does not currently use. No handrails. Standard height toilet.  -     Number of Stairs, Within Home, Primary seven  Split level home  -       Row Name 07/29/23 0905          Cognition    Orientation Status (Cognition) oriented x 4  -       Row Name 07/29/23 0905          Safety Issues, Functional Mobility    Impairments Affecting Function (Mobility) balance;strength  -               User Key  (r) = Recorded By, (t) = Taken By, (c) = Cosigned By      Initials Name Provider Type     Rosa Malin OT Occupational Therapist                     Mobility/ADL's       Row Name 07/29/23 0905          Bed Mobility    Bed Mobility supine-sit;sit-supine  -     Supine-Sit [place] (Bed Mobility) modified independence  -     Sit-Supine [place] (Bed Mobility) modified independence  -       Row Name 07/29/23 0905          Transfers    Transfers sit-stand transfer;stand-sit transfer;toilet transfer  -       Row Name 07/29/23 0905          Sit-Stand Transfer    Sit-Stand [place]  (Transfers) contact guard  -       Row Name 07/29/23 0905          Stand-Sit Transfer    Stand-Sit [place] (Transfers) contact guard  -[facility] Name 07/29/23 0905          Toilet Transfer    [place] Level (Toilet Transfer) contact guard  -SA       Row Name 07/29/23 0905          Functional Mobility    Functional Mobility- Ind. Level contact guard assist  -     Functional Mobility-Distance (Feet) 60  -     Functional Mobility- Comment Pt displayed at least 3 LOB to left side with self correction. Pt would benefit from use of FWW for safety.  -       Row Name 07/29/23 0905          Activities of Daily Living    BADL Assessment/Intervention lower body dressing;toileting  -SA       Row Name 07/29/23 0905          Lower Body Dressing Assessment/Training    [place] Level (Lower Body Dressing) socks;doff;don;independent  -SA       Row Name 07/29/23 0905          Toileting Assessment/Training    [place] Level (Toileting) adjust/manage clothing;contact guard assist  -     Assistive Devices (Toileting) commode  -     Position (Toileting) unsupported standing  -               User Key  (r) = Recorded By, (t) = Taken By, (c) = Cosigned By      Initials Name Provider Type    Rosa Hines OT Occupational Therapist                   Obj/Interventions       [facility] Name 07/29/23 0905          Sensory Assessment (Somatosensory)    Sensory Assessment (Somatosensory) UE sensation intact  -SA       Row Name 07/29/23 0905          Vision Assessment/Intervention    Visual Impairment/Limitations WNL  -SA       Row Name 07/29/23 0905          Range of Motion Comprehensive    General Range of Motion bilateral upper extremity ROM WNL  -SA       Row Name 07/29/23 0905          Strength Comprehensive (MMT)    General Manual Muscle Testing (MMT) Assessment other (see comments)  -     Comment, General Manual Muscle Testing (MMT) Assessment BUE 4/5 grossly  -[facility] Name 07/29/23 0905          Motor  Skills    Motor Skills coordination  -     Coordination WNL  -               User Key  (r) = Recorded By, (t) = Taken By, (c) = Cosigned By      Initials Name Provider Type    Rosa Hines OT Occupational Therapist                   Goals/Plan       Row Name 07/29/23 0905          Transfer Goal 1 (OT)    Activity/Assistive Device (Transfer Goal 1, OT) toilet;walker, rolling  -SA     [place] Level/Cues Needed (Transfer Goal 1, OT) modified independence  -SA     Time Frame (Transfer Goal 1, OT) long term goal (LTG);by discharge  -SA     Progress/Outcome (Transfer Goal 1, OT) goal not met  -SA       Row Name 07/29/23 0905          Bathing Goal 1 (OT)    Activity/Device (Bathing Goal 1, OT) lower body bathing  -SA     [place] Level/Cues Needed (Bathing Goal 1, OT) independent  -SA     Time Frame (Bathing Goal 1, OT) long term goal (LTG);by discharge  -SA     Progress/Outcomes (Bathing Goal 1, OT) goal not met  -SA       Row Name 07/29/23 0905          Toileting Goal 1 (OT)    Activity/Device (Toileting Goal 1, OT) toileting skills, all  -SA     [place] Level/Cues Needed (Toileting Goal 1, OT) independent  -SA     Time Frame (Toileting Goal 1, OT) long term goal (LTG);by discharge  -SA     Progress/Outcome (Toileting Goal 1, OT) goal not met  -SA       Row Name 07/29/23 0905          Therapy Assessment/Plan (OT)    Planned Therapy Interventions (OT) activity tolerance training;manual therapy/joint mobilization;patient/caregiver education/training;adaptive equipment training;neuromuscular control/coordination retraining;BADL retraining;occupation/activity based interventions;ROM/therapeutic exercise;strengthening exercise;cognitive/visual perception retraining;edema control/reduction;functional balance retraining;IADL retraining;passive ROM/stretching;transfer/mobility retraining  -               User Key  (r) = Recorded By, (t) = Taken By, (c) = Cosigned By      Initials Name Provider Type      Rosa Malin, CHATO Occupational Therapist                   Clinical Impression       Row Name 07/29/23 0905          Pain Assessment    Pretreatment Pain Rating 6/10  -SA     Posttreatment Pain Rating 5/10  -SA     Pain Location lower  -SA     Pain Location - back  -SA     Pain Intervention(s) Repositioned;Rest;Ambulation/increased activity  -       Row Name 07/29/23 0905          Plan of Care Review    Plan of Care Reviewed With patient;spouse  -     Outcome Evaluation OT eval completed as co-eval with PT. Pt supine and agreeable. Mod I for bed mobility. CGA sit<>stand and func mob. CGA sit<>stand from toilet and to manage gown. Pt displayed at least 3 LOB to left side with self correction. Pt would benefit from use of FWW for safety. Pt Ind don<>doff socks seated EOB. Pt would benefit from continued skilled IP/OT if she does not d/c this date. Anticipate d/c home with assist from spouse  -[facility] Name 07/29/23 0905          Therapy Assessment/Plan (OT)    Patient/Family Therapy Goal Statement (OT) return home  -     Rehab Potential (OT) good, to achieve stated therapy goals  -     Criteria for Skilled Therapeutic Interventions Met (OT) yes;meets criteria;skilled treatment is necessary  -     Therapy Frequency (OT) other (see comments)  5-7 d/wk  -     Predicted Duration of Therapy Intervention (OT) until all goals met or d/c  -[facility] Name 07/29/23 0905          Therapy Plan Review/Discharge Plan (OT)    Anticipated Discharge Disposition (OT) home with assist  -[facility] Name 07/29/23 0905          Vital Signs    Pre Systolic BP Rehab 189  -SA     Pre Treatment Diastolic BP 79  -SA     Post Systolic BP Rehab 177  -SA     Post Treatment Diastolic BP 90  -SA     Pretreatment Heart Rate (beats/min) 82  -SA     Posttreatment Heart Rate (beats/min) 75  -SA     Pre SpO2 (%) 98  -SA     O2 Delivery Pre Treatment room air  -SA     Post SpO2 (%) 98  -SA     O2 Delivery Post Treatment room air   -SA     Pre Patient Position Supine  -SA     Post Patient Position Supine  -SA       Row Name 07/29/23 0905          Positioning and Restraints    Pre-Treatment Position in bed  -SA     Post Treatment Position bed  -SA     In Bed notified nsg;call light within reach;encouraged to call for assist;with family/caregiver  -               User Key  (r) = Recorded By, (t) = Taken By, (c) = Cosigned By      Initials Name Provider Type    Rosa Hines OT Occupational Therapist                   Outcome Measures       Row Name 07/29/23 0905          How much help from another is currently needed...    Putting on and taking off regular lower body clothing? 4  -SA     Bathing (including washing, rinsing, and drying) 3  -SA     Toileting (which includes using toilet bed pan or urinal) 3  -SA     Putting on and taking off regular upper body clothing 4  -SA     Taking care of personal grooming (such as brushing teeth) 4  -SA     Eating meals 4  -SA     AM-PAC 6 Clicks Score (OT) 22  -SA       Row Name 07/29/23 0903          How much help from another person do you currently need...    Turning from your back to your side while in flat bed without using bedrails? 4  -MM     Moving from lying on back to sitting on the side of a flat bed without bedrails? 4  -MM     Moving to and from a bed to a chair (including a wheelchair)? 3  -MM     Standing up from a chair using your arms (e.g., wheelchair, bedside chair)? 3  -MM     Climbing 3-5 steps with a railing? 2  -MM     To walk in hospital room? 3  -MM     AM-PAC 6 Clicks Score (PT) 19  -MM     Highest level of mobility 6 --> Walked 10 steps or more  -MM       Row Name 07/29/23 0905 07/29/23 0903       Modified [place]el Scale    Pre-Stroke Modified [place] Scale 0 - No Symptoms at all.  -SA 0 - No Symptoms at all.  -MM    Modified [place] Scale 1 - No significant disability despite symptoms.  Able to carry out all usual duties and activities.  -SA 1 - No significant disability  despite symptoms.  Able to carry out all usual duties and activities.  -MM      Row Name 07/29/23 0905 07/29/23 0903       Functional Assessment    Outcome Measure Options AM-PAC 6 Clicks Daily Activity (OT);Modified Ariel  [facility] AM-PAC 6 Clicks Basic Mobility (PT);[place];Modified [place]  -MM              User Key  (r) = Recorded By, (t) = Taken By, (c) = Cosigned By      Initials Name Provider Type    Rosa Hines, OT Occupational Therapist    MM Krystle Leone PT Physical Therapist                    Occupational Therapy Education       Title: PT OT SLP Therapies (In Progress)       Topic: Occupational Therapy (Done)       Point: ADL training (Done)       Description:   Instruct learner(s) on proper safety adaptation and remediation techniques during self care or transfers.   Instruct in proper use of assistive devices.                  Learning Progress Summary             Patient Acceptance, E,TB, VU by  at 7/29/2023 0938    Comment: OT POC, Role of OT, transfer training   Family Acceptance, E,TB, VU by  at 7/29/2023 0938    Comment: OT POC, Role of OT, transfer training                         Point: Home exercise program (Done)       Description:   Instruct learner(s) on appropriate technique for monitoring, assisting and/or progressing therapeutic exercises/activities.                  Learning Progress Summary             Patient Acceptance, E,TB, VU by  at 7/29/2023 0938    Comment: OT POC, Role of OT, transfer training   Family Acceptance, E,TB, VU by  at 7/29/2023 0938    Comment: OT POC, Role of OT, transfer training                         Point: Precautions (Done)       Description:   Instruct learner(s) on prescribed precautions during self-care and functional transfers.                  Learning Progress Summary             Patient Acceptance, E,TB, VU by  at 7/29/2023 0938    Comment: OT POC, Role of OT, transfer training   Family Acceptance, E,TB, VU by  at 7/29/2023 0938     Comment: OT POC, Role of OT, transfer training                         Point: Body mechanics (Done)       Description:   Instruct learner(s) on proper positioning and spine alignment during self-care, functional mobility activities and/or exercises.                  Learning Progress Summary             Patient Acceptance, E,TB, VU by  at 7/29/2023 0938    Comment: OT POC, Role of OT, transfer training   Family Acceptance, E,TB, VU by  at 7/29/2023 0938    Comment: OT POC, Role of OT, transfer training                                         User Key       Initials Effective Dates Name Provider Type Discipline     08/11/22 -  Rosa Malin OT Occupational Therapist OT                  OT Recommendation and Plan  Planned Therapy Interventions (OT): activity tolerance training, manual therapy/joint mobilization, patient/caregiver education/training, adaptive equipment training, neuromuscular control/coordination retraining, BADL retraining, occupation/activity based interventions, ROM/therapeutic exercise, strengthening exercise, cognitive/visual perception retraining, edema control/reduction, functional balance retraining, IADL retraining, passive ROM/stretching, transfer/mobility retraining  Therapy Frequency (OT): other (see comments) (5-7 d/wk)  Plan of Care Review  Plan of Care Reviewed With: patient, spouse  Outcome Evaluation: OT eval completed as co-eval with PT. Pt supine and agreeable. Mod I for bed mobility. CGA sit<>stand and func mob. CGA sit<>stand from toilet and to manage gown. Pt displayed at least 3 LOB to left side with self correction. Pt would benefit from use of FWW for safety. Pt Ind don<>doff socks seated EOB. Pt would benefit from continued skilled IP/OT if she does not d/c this date. Anticipate d/c home with assist from spouse     Time Calculation:   Evaluation Complexity (OT)  Review Occupational Profile/Medical/Therapy History Complexity: expanded/moderate complexity  Assessment,  Occupational Performance/Identification of Deficit Complexity: 3-5 performance deficits  Clinical Decision Making Complexity (OT): detailed assessment/moderate complexity  Overall Complexity of Evaluation (OT): moderate complexity     Time Calculation- OT       Row Name 07/29/23 0905             Time Calculation- OT    OT Start Time 0905  -SA      OT Stop Time 0940  -SA      OT Time Calculation (min) 35 min  -SA      OT Received On 07/29/23  -SA      OT Goal Re-Cert Due Date 08/11/23  -SA         Untimed Charges    OT Eval/Re-eval Minutes 35  -SA         Total Minutes    Untimed Charges Total Minutes 35  -SA       Total Minutes 35  -SA                User Key  (r) = Recorded By, (t) = Taken By, (c) = Cosigned By      Initials Name Provider Type     Rosa Malin OT Occupational Therapist                  Therapy Charges for Today       Code Description Service Date Service Provider Modifiers Qty    [id number] HC OT EVAL MOD COMPLEXITY 2 7/29/2023 Rosa Malin OT GO 1                 Rosa Malin OT  7/29/2023

## 2023-07-29 NOTE — PLAN OF CARE
Goal Outcome Evaluation:  Plan of Care Reviewed With: patient        Progress: improving  Outcome Evaluation: Pt NIH 0 and pt states that she has no deficits.  Pt up to bathroom. UOP adequate and VSS while on room air.  All needs met at this time.

## 2023-07-29 NOTE — PLAN OF CARE
Goal Outcome Evaluation:  Plan of Care Reviewed With: patient, spouse           Outcome Evaluation: OT eval completed as co-eval with PT. Pt supine and agreeable. Mod I for bed mobility. CGA sit<>stand and func mob. CGA sit<>stand from toilet and to manage gown. Pt displayed at least 3 LOB to left side with self correction. Pt would benefit from use of FWW for safety. Pt Ind don<>doff socks seated EOB. Pt would benefit from continued skilled IP/OT if she does not d/c this date. Anticipate d/c home with assist from spouse      Anticipated Discharge Disposition (OT): home with assist

## 2023-07-29 NOTE — DISCHARGE SUMMARY
"    DISCHARGE SUMMARY    PATIENT NAME: Krystina uDong   PHYSICIAN: Matt Chanel MD  : 1950  MRN: [id number]    ADMITTED: 2023     DISCHARGED: 2023      ADMITTING DIAGNOSIS:  Gait abnormality [R26.9]  Syncope [R55]  Stroke [I63.9]  CALISTA (acute kidney injury) [N17.9]  Syncope, unspecified syncope type [R55]  Acute midline thoracic back pain [M54.6]      DISCHARGE, AND RESOLVED DIAGNOSES:  Active Hospital Problems    Diagnosis  POA    Anemia due to vitamin B12 deficiency [D51.9]  Yes      Resolved Hospital Problems    Diagnosis Date Resolved POA    **CALISTA (acute kidney injury) [N17.9] 2023 Yes    Right hand weakness [R29.898] 2023 Yes    Hypokalemia [E87.6] 2023 Yes    Syncope [R55] 2023 Yes    Stroke [I63.9] 2023 Yes         SERVICE: Family Medicine Residency  Attending: Dr. Keith Martinez  Resident: Matt Chanel MD    CONSULTS:   Consult Orders (all) (From admission, onward)       Start     Ordered    23 1234  Inpatient Case Management  Consult  Once        Provider:  (Not yet assigned)    23 1235    23 1234  Inpatient Diabetes Educator Consult  Once        Provider:  (Not yet assigned)    23 1235    23 1036  Inpatient Neurology Consult Stroke  Once        Specialty:  Neurology  Provider:  (Not yet assigned)    23 1044    23 1036  Inpatient Neurology Consult Stroke  Once        Specialty:  Neurology  Provider:  (Not yet assigned)    23 1044                    PROCEDURES:   None    HISTORY OF PRESENT ILLNESS:   Per the H&P written by Dr.Catherine Tello, dated 2023:  \"Krystina Duong is a 73 y.o. female with a CMH of HTN, fibromyalgia, compression fracture, and HLD who presents complaining of right hand weakness. Yesterday patient states she fell in her home, she said she felt dizzy and remembers waking up on her kitchen floor. She states today she does not feel dizzy but feels weak. She has " "had difficulty with her gait today and notices she is stumbling and she is frequently dropping things out of her right hand. She does endorse that her back hurts during the exam and she believes the symptoms are related to her back pain and her previous fracture.      ED course: CT scan of head was obtained with no acute abnormalities, CT perfusion studies show no stenosis. Neurology consulted in ED, given loading dose of plavix and aspirin. MRI is scheduled for stroke r/o. Cr elevated to 2.04, K at 3.1. CBC shows Hb at 10.6. 1L bolus given for CALISTA likely worsened by CT contrast. \"    DIAGNOSTIC DATA:   Lab Results (last 72 hours)       Procedure Component Value Units Date/Time    POC Glucose Once [[id number]]  (Normal) Collected: 07/29/23 0135    Specimen: Blood Updated: 07/29/23 0220     Glucose 87 mg/dL      Comment: : [id number] MATTY PAMMeter ID: [id number]       Creatinine Urine Random (kidney function) GFR component - Urine, Clean Catch [[id number]] Collected: 07/28/23 1155    Specimen: Urine, Clean Catch Updated: 07/28/23 2004     Creatinine, Urine 50.1 mg/dL     Narrative:      Reference intervals for random urine have not been established.  Clinical usage is dependent upon physician's interpretation in combination with other laboratory tests.       Urine Culture - Urine, Urine, Clean Catch [[id number]] Collected: 07/28/23 1155    Specimen: Urine, Clean Catch Updated: 07/28/23 1631    Extra Tubes [[id number]] Collected: 07/28/23 1102    Specimen: Blood, Venous Line Updated: 07/28/23 1515    Narrative:      The following orders were created for panel order Extra Tubes.  Procedure                               Abnormality         Status                     ---------                               -----------         ------                     [place] Top[[id number]]                                         Final result                 Please view results for these tests on the individual orders.    Gray Top " [[id number]] Collected: 07/28/23 1102    Specimen: Blood Updated: 07/28/23 1515     Extra Tube Hold for add-ons.     Comment: Auto resulted.       Urinalysis, Microscopic Only - Urine, Clean Catch [[id number]]  (Abnormal) Collected: 07/28/23 1155    Specimen: Urine, Clean Catch Updated: 07/28/23 1356     RBC, UA 3-5 /HPF      WBC, UA 6-12 /HPF      Bacteria, UA Trace /HPF      Squamous Epithelial Cells, UA 3-5 /HPF      Hyaline Casts, UA None Seen /LPF      Methodology Manual Light Microscopy    Urinalysis With Microscopic If Indicated (No Culture) - Urine, Clean Catch [[id number]]  (Abnormal) Collected: 07/28/23 1155    Specimen: Urine, Clean Catch Updated: 07/28/23 1330     Color, UA Yellow     Appearance, UA Clear     pH, UA 5.5     Specific [place], UA 1.030     Comment: Result obtained by Refractometer        Glucose, UA Negative     Ketones, UA Negative     Bilirubin, UA Negative     Blood, UA Trace     Protein, UA Negative     Leuk Esterase, UA Large (3+)     Nitrite, UA Negative     Urobilinogen, UA 0.2 E.U./dL    Sodium, Urine, Random - Urine, Clean Catch [[id number]] Collected: 07/28/23 1155    Specimen: Urine, Clean Catch Updated: 07/28/23 1305     Sodium, Urine 53 mmol/L     Narrative:      Reference intervals for random urine have not been established.  Clinical usage is dependent upon physician's interpretation in combination with other laboratory tests.       Magnesium [[id number]]  (Normal) Collected: 07/28/23 1055    Specimen: Blood Updated: 07/28/23 1258     Magnesium 2.3 mg/dL     [place] Draw [[id number]] Collected: 07/28/23 1055    Specimen: Blood Updated: 07/28/23 1201    Narrative:      The following orders were created for panel order [place] Draw.  Procedure                               Abnormality         Status                     ---------                               -----------         ------                     Green Top (Gel)[[id number]]                                  Final result                Lavender Top[[id number]]                                     Final result               Gold Top - SST[[id number]]                                   Final result               Light Blue Top[[id number]]                                   Final result                 Please view results for these tests on the individual orders.    Light Blue Top [[id number]] Collected: 07/28/23 1055    Specimen: Blood Updated: 07/28/23 1201     Extra Tube Hold for add-ons.     Comment: Auto resulted       Lavender Top [[id number]] Collected: 07/28/23 1055    Specimen: Blood Updated: 07/28/23 1201     Extra Tube hold for add-on     Comment: Auto resulted       Gold Top - SST [[id number]] Collected: 07/28/23 1055    Specimen: Blood Updated: 07/28/23 1201     Extra Tube Hold for add-ons.     Comment: Auto resulted.       Green Top (Gel) [[id number]] Collected: 07/28/23 1055    Specimen: Blood Updated: 07/28/23 1201     Extra Tube Hold for add-ons.     Comment: Auto resulted.       aPTT [[id number]]  (Normal) Collected: 07/28/23 1055    Specimen: Blood Updated: 07/28/23 1133     PTT 29.4 seconds     Narrative:      The recommended Heparin therapeutic range is 68-97 seconds.    Protime-INR [[id number]]  (Normal) Collected: 07/28/23 1055    Specimen: Blood Updated: 07/28/23 1133     Protime 13.6 Seconds      INR 1.04    Narrative:      Therapeutic range for most indications is 2.0-3.0 INR,  or 2.5-3.5 for mechanical heart valves.    Comprehensive Metabolic Panel [[id number]]  (Abnormal) Collected: 07/28/23 1055    Specimen: Blood Updated: 07/28/23 1131     Glucose 93 mg/dL      BUN 43 mg/dL      Creatinine 2.04 mg/dL      Sodium 137 mmol/L      Potassium 3.1 mmol/L      Chloride 101 mmol/L      CO2 28.0 mmol/L      Calcium 8.8 mg/dL      Total Protein 6.3 g/dL      Albumin 3.3 g/dL      ALT (SGPT) 15 U/L      AST (SGOT) 22 U/L      Alkaline Phosphatase 60 U/L      Total Bilirubin 0.2 mg/dL      Globulin 3.0 gm/dL      A/G Ratio 1.1 g/dL       BUN/Creatinine Ratio 21.1     Anion Gap 8.0 mmol/L      eGFR 25.7 mL/min/1.73     Narrative:      GFR Normal >60  Chronic Kidney Disease <60  Kidney Failure <15    The GFR formula is only valid for adults with stable renal function between ages 18 and 70.    Single High Sensitivity Troponin T [[id number]]  (Abnormal) Collected: 07/28/23 1055    Specimen: Blood Updated: 07/28/23 1129     HS Troponin T 27 ng/L     Narrative:      High Sensitive Troponin T Reference Range:  <10.0 ng/L- Negative Female for AMI  <15.0 ng/L- Negative Male for AMI  >=10 - Abnormal Female indicating possible myocardial injury.  >=15 - Abnormal Male indicating possible myocardial injury.   Clinicians would have to utilize clinical acumen, EKG, Troponin, and serial changes to determine if it is an Acute Myocardial Infarction or myocardial injury due to an underlying chronic condition.         CBC & Differential [[id number]]  (Abnormal) Collected: 07/28/23 1055    Specimen: Blood Updated: 07/28/23 1106    Narrative:      The following orders were created for panel order CBC & Differential.  Procedure                               Abnormality         Status                     ---------                               -----------         ------                     CBC Auto Differential[[id number]]        Abnormal            Final result                 Please view results for these tests on the individual orders.    CBC Auto Differential [[id number]]  (Abnormal) Collected: 07/28/23 1055    Specimen: Blood Updated: 07/28/23 1106     WBC 5.37 10*3/mm3      RBC 3.46 10*6/mm3      Hemoglobin 10.6 g/dL      Hematocrit 32.8 %      MCV 94.8 fL      MCH 30.6 pg      MCHC 32.3 g/dL      RDW 12.3 %      RDW-SD 42.5 fl      MPV 10.1 fL      Platelets 159 10*3/mm3      Neutrophil % 58.3 %      Lymphocyte % 20.5 %      Monocyte % 11.5 %      Eosinophil % 8.4 %      Basophil % 1.1 %      Immature Grans % 0.2 %      Neutrophils, Absolute 3.13 10*3/mm3       Lymphocytes, Absolute 1.10 10*3/mm3      Monocytes, Absolute 0.62 10*3/mm3      Eosinophils, Absolute 0.45 10*3/mm3      Basophils, Absolute 0.06 10*3/mm3      Immature Grans, Absolute 0.01 10*3/mm3      nRBC 0.0 /100 WBC              CT Angiogram Neck    Result Date: 7/28/2023  No occlusion or flow-limiting stenosis.     CT Thoracic Spine Without Contrast    Result Date: 7/28/2023  Age-indeterminate favoring remote moderate fractures of T5, T7, and T12. Please note that the T1 vertebra was not imaged on this study.     CT Lumbar Spine Without Contrast    Result Date: 7/28/2023  No acute fracture or malalignment within the lumbar spine.    MRI Brain Without Contrast    Result Date: 7/28/2023  Impression: 1. No acute abnormality within the brain.     CT Head Without Contrast Stroke Protocol    Result Date: 7/28/2023  No acute intracranial process.     CT Angiogram Head w AI Analysis of LVO    Result Date: 7/28/2023  No occlusion or flow-limiting stenosis.       Review of Systems   Constitutional:  Negative for fatigue and fever.   HENT:  Negative for trouble swallowing.    Respiratory:  Negative for shortness of breath and wheezing.    Cardiovascular:  Negative for chest pain, palpitations and leg swelling.   Gastrointestinal:  Negative for abdominal pain, diarrhea, nausea and vomiting.   Neurological:  Negative for dizziness, speech difficulty, weakness, light-headedness and numbness.      Physical Exam  Constitutional:       Appearance: Normal appearance.   HENT:      Head: Normocephalic and atraumatic.   Eyes:      Conjunctiva/sclera: Conjunctivae normal.   Cardiovascular:      Rate and Rhythm: Normal rate and regular rhythm.      Heart sounds: Normal heart sounds. No murmur heard.  Pulmonary:      Effort: Pulmonary effort is normal. No respiratory distress.      Breath sounds: Normal breath sounds and air entry. No stridor. No wheezing or rales.   Chest:      Chest wall: No tenderness.   Abdominal:      General:  Bowel sounds are normal. There is no distension.      Palpations: Abdomen is soft.      Tenderness: There is no abdominal tenderness.   Musculoskeletal:         General: No swelling, tenderness or signs of injury. Normal range of motion.      Cervical back: Neck supple.      Right lower leg: No edema.      Left lower leg: No edema.   Neurological:      General: No focal deficit present.      Mental Status: She is alert and oriented to person, place, and time.   Psychiatric:         Behavior: Behavior normal.        HOSPITAL COURSE:  Right hand weakness vs stroke  Krystina Duong is a 72yo female who presented with right hand weakness s/p fall yesterday at home. CT of head obtained showed no acute abnormalities. CT perfusion studies showed no stenosis. Neurology consulted given loading dose of plavix and aspirin. Echo EF 61-65%. MRI of brain ruled out stroke showed no acute abnormalities. Neurology consulted continue plavix and lipitor. And discontinue aspirin after 21 days. On discharge pt NIH 0, GSC 15 with no neuro deficits. Right hand weakness improved. UOP adequate and VSS on room air. The weakness on admission was most likely 2/2 to pain. Pt informed to follow up with PCP in 1 week.    CALISTA resolved  Cr elevated to 2.04. resolved to 0.90. Urine creatinine 50.1, urine Na 53. 1 L bolus IVF was given for CALISTA likely due to CT contrast.    Hypokalemia resolved  K 3.1 was supplemented. Repeat CMP K 4.5.    Essential Hypertension  BP on discharge 169/79 resolved to 128/75 after receiving her home atenolol 25mg BID. Pt is informed to keep a log of BP twice a day and follow up with PCP.     DISCHARGE CONDITION:   Stable    DISPOSITION:  Home or Self Care    DISCHARGE MEDICATIONS     Discharge Medications        New Medications        Instructions Start Date   aspirin 81 MG chewable tablet   81 mg, Oral, Daily      atorvastatin 80 MG tablet  Commonly known as: LIPITOR   40 mg, Oral, Nightly      clopidogrel 75 MG  tablet  Commonly known as: PLAVIX   75 mg, Oral, Daily             Continue These Medications        Instructions Start Date   atenolol 25 MG tablet  Commonly known as: TENORMIN   25 mg, Oral, 2 Times Daily - RT      cyclobenzaprine 10 MG tablet  Commonly known as: FLEXERIL   10 mg, Oral, 2 Times Daily      DULoxetine 60 MG capsule  Commonly known as: CYMBALTA   60 mg, Oral, Daily      folic acid 1 MG tablet  Commonly known as: FOLVITE   1 mg, Oral, Daily      oxyCODONE-acetaminophen 7.5-325 MG per tablet  Commonly known as: PERCOCET   1 tablet, Oral, Daily PRN, Patient states she takes 10's not 7.5      traMADol 50 MG tablet  Commonly known as: ULTRAM   50 mg, Oral, Every 8 Hours      traZODone 150 MG tablet  Commonly known as: DESYREL   150 mg, Oral, Nightly      vitamin B-12 1000 MCG tablet  Commonly known as: CYANOCOBALAMIN   1,000 mcg, Oral, Daily               INSTRUCTIONS:  Activity:   Activity Instructions       Activity as Tolerated            Diet:   Diet Instructions       Diet: Regular/House Diet; Regular Texture (IDDSI 7); Thin (IDDSI 0)      Discharge Diet: Regular/House Diet    Texture: Regular Texture (IDDSI 7)    Fluid Consistency: Thin (IDDSI 0)            FOLLOW UP:   Additional Instructions for the Follow-ups that You Need to Schedule       Discharge Follow-up with PCP   As directed       Currently Documented PCP:    Mack Cameron MD    PCP Phone Number:    [phone]     Follow Up Details: 1 week               Follow-up Information       Mack Cameron MD Follow up in 1 week(s).    Specialty: Family Medicine  Why: office will call you with appointment  Contact information:  [address] [place], [place] [id number] [phone]                             PENDING TEST RESULTS AT DISCHARGE  Pending Labs       Order Current Status    Urine Culture - Urine, Urine, Clean Catch In process            Time: >30 minutes were spent in discharge planning, medication reconciliation and coordination of  care for this patient.    Dr. Keith Martinez is the attending at time of discharge, He is aware of the patient's status and agrees with the above discharge summary.      Matt Chanel MD, PGY1  [facility]  [address] [place], [place] [address]  Office: [phone]  This document has been electronically signed by Matt Chanel on July 29, 2023 10:21 CDT

## 2023-07-29 NOTE — PROGRESS NOTES
SUBJECTIVE  Patient denies right sided weakness  Patient denies Ataxic gait    OBJECTIVE  MRI brain no acute abnormality  ECHO = EF >35%, no intracardiac clot, no PFO  CTA H/N no sig stenosis    Exam: Neuro exam:  COGNITIVE: Awake, alert and in no apparent distress. Oriented to self and current month. Patient responds easily to voices and correctly follows multi-step commands. Testing of calculation, spatial orientation, short-term memory, proverb interpretation and repetition are deferred. Good word finding and no aphasia.  CRANIAL NERVES: Good facial symmetry of lower half of face upon smiling. Good Facial symmetry of upper half of face and forehead when patient raises eyebrows.  Extraocular movements intact, no dysconjugate gaze or horizontal nystagmus seen.  Visual fields intact to confrontation, no hemianopsia, quadrantanopsia or central scotoma detected. Testing of olfaction, auditory acuity, palatal movement and shoulder shrug/spinal accessory nerve function are all deferred  MOTOR EXAM: lifts/holds up both arms for a full ten second count without drift or pronation. lifts/holds both legs off the bed for a full five second count without drift.  SENSORY EXAM: no sensory deficit to light touch noted. No sensory level noted. More extensive Proprioception/pain temperature spinothalamic sensation testing deferred.  GAIT: deferred        ASSESSMENT/PLAN: TIA  ---d/c aspirin in 21 days after starting it, continue plavix and lipitor    Ok to d/c  No further recommendations, Neurology signs off. please call or text with any questions.        IAditya MD, saw the patient for a follow up or in-patient or emergency room telememedicine face to face consult or chart review using interactive technology. The location of the patient was at [facility] . I was located at [facility] .    I have proceeded with this evaluation at the request of the referring practitioner as it is felt to be necessary and no  appropriate specialist is available to perform this evaluation. The [facility] has reported that this is the correct patient and has obtained consent from the patient/surrogate whenever possible to perform this telemedicine evaluation(including obtaining history, performing examination and reviewing data provided by the patient an/or originating site of care provider)    I have either done chart review only or introduced myself to the patient, provided my credentials, disclosed my location, and determined that, based on review of the patient's chart and discussion with the patient's primary team, telemedicine via a HIPAA compliant, real-time, face-to-face two-way, interactive audio and video platform is an appropriate and effective means of providing the service.    The patient/surrogate has a right to refuse this evaluation as they have been explained risks including potential loss of confidentiality, benefits, alternatives, and the potential need for subsequent face-to-face care. In this evaluation, we will be providing recommendations only.  The ultimate decision to follow or not to follow these recommendations will be left to the bedside treating/requesting practitioner.    The patient/surrogate whenevr possible has been notified that other healthcare professionals including technical person may be involved in this A/V evaluation.  All laws concerning confidentiality and patient access to medical records and copies of medical records apply to telemedicine.  The patient/surrogate has received the [facility]'s Health Notice of Privacy Practices.    Time spent with patient: 30 minutes in face-to-face evaluation and/or management of the patient using the dedicated telemedicine device without any interruption with the help of the registered nurse with the patient located at the [facility] and myself at a remote location.  Aditya Abarca MD, JOSE ELIAS

## 2023-07-30 LAB — BACTERIA SPEC AEROBE CULT: NO GROWTH

## 2023-08-10 NOTE — PROGRESS NOTES
"Enter Query Response Below      Query Response: TIA ruled out              If applicable, please update the problem list.   Patient: Krystina Duong        : 1950  Account: [id number]           Admit Date: 2023        How to Respond to this query:       a. Click New Note     b. Answer query within the yellow box.                c. Update the Problem List, if applicable.      If you have any questions about this query contact me at: [email or web address]    Dr. Chanel,     72 yo female admitted 23 for weakness and dizziness per History and Physical.  Risk Factors: Hypertension  Clinical indicators: Neurology consulted 23 \"TIA.\" Head and Neck CT 23 \"Neck-No occlusion, flow-limiting stenosis, aneurysm, or dissection. Head-No occlusion, flow-limiting stenosis, aneurysm, or dissection.\"  Discharge summary \"right hand weakness.\"  Treatment: Aspirin, Plavix, and Lipitor    Please clarify the etiology of TIA:    TIA ruled out  TIA due to (please list underlying etiology(ies) ______  TIA etiology unknown  Other- specify__________  Unable to determine    By submitting this query, we are merely seeking further clarification of documentation to accurately reflect all conditions that you are monitoring, evaluating, treating or that extend the hospitalization or utilize additional resources of care. Please utilize your independent clinical judgment when addressing the question(s) above.     This query and your response, once completed, will be entered into the legal medical record.    Sincerely,  Filomena BARLOW RN CCDS  System Director Clinical Documentation Integrity Program     "

## 2023-08-27 LAB
QT INTERVAL: 404 MS
QTC INTERVAL: 472 MS

## (undated) DEVICE — DEV INFL BALN BIG60 W/GAUGE 60ML

## (undated) DEVICE — CANN SMPL SOFTECH BIFLO ETCO2 A/M 7FT

## (undated) DEVICE — BITEBLOCK ENDO W/STRAP 60F A/ LF DISP

## (undated) DEVICE — DILATOR CONTRL RADL 12/15MM 8CM BX5

## (undated) DEVICE — ESOPHAGEAL BALLOON DILATATION CATHETER: Brand: CRE FIXED WIRE

## (undated) DEVICE — ESOPHAGEAL/PYLORIC/COLONIC WIREGUIDED BALLOON DILATATION CATHETER: Brand: CRE WIREGUIDED

## (undated) DEVICE — SINGLE-USE BIOPSY FORCEPS: Brand: RADIAL JAW 4